# Patient Record
Sex: FEMALE | Race: WHITE | NOT HISPANIC OR LATINO | Employment: OTHER | URBAN - METROPOLITAN AREA
[De-identification: names, ages, dates, MRNs, and addresses within clinical notes are randomized per-mention and may not be internally consistent; named-entity substitution may affect disease eponyms.]

---

## 2017-02-02 DIAGNOSIS — F41.9 ANXIETY: Primary | ICD-10-CM

## 2017-02-02 NOTE — TELEPHONE ENCOUNTER
Patient takes this for anxiety.  Prescription approved per [facility] Refill Protocol.  Jil Morales, RN  Triage Nurse

## 2017-02-02 NOTE — TELEPHONE ENCOUNTER
sertraline (ZOLOFT) 50 MG     Last Written Prescription Date: 8/8/16  Last Fill Quantity: 90, # refills: 1  Last Office Visit with [facility] primary care provider:  8/8/16   Next 5 appointments (look out 90 days)     Feb 13, 2017  9:15 AM   PHYSICAL with Jaenne Sanon MD   [facility] ([facility]    [address] [place] [id number] [phone]                   Last PHQ-9 score on record=   PHQ-9 SCORE 8/13/2014   Total Score -   Total Score MyChart 0

## 2017-02-13 ENCOUNTER — OFFICE VISIT (OUTPATIENT)
Dept: FAMILY MEDICINE | Facility: CLINIC | Age: 82
End: 2017-02-13
Payer: COMMERCIAL

## 2017-02-13 VITALS
HEART RATE: 79 BPM | RESPIRATION RATE: 16 BRPM | SYSTOLIC BLOOD PRESSURE: 128 MMHG | BODY MASS INDEX: 22.11 KG/M2 | DIASTOLIC BLOOD PRESSURE: 64 MMHG | OXYGEN SATURATION: 96 % | TEMPERATURE: 97.8 F | WEIGHT: 132.7 LBS | HEIGHT: 65 IN

## 2017-02-13 DIAGNOSIS — F41.9 ANXIETY: ICD-10-CM

## 2017-02-13 DIAGNOSIS — E78.5 HYPERLIPIDEMIA LDL GOAL <130: ICD-10-CM

## 2017-02-13 DIAGNOSIS — M41.34 THORACOGENIC SCOLIOSIS OF THORACIC REGION: ICD-10-CM

## 2017-02-13 DIAGNOSIS — Z00.00 ROUTINE GENERAL MEDICAL EXAMINATION AT A HEALTH CARE FACILITY: Primary | ICD-10-CM

## 2017-02-13 DIAGNOSIS — R09.89 RIGHT CAROTID BRUIT: ICD-10-CM

## 2017-02-13 PROCEDURE — 99397 PER PM REEVAL EST PAT 65+ YR: CPT | Performed by: INTERNAL MEDICINE

## 2017-02-13 PROCEDURE — 36415 COLL VENOUS BLD VENIPUNCTURE: CPT | Performed by: INTERNAL MEDICINE

## 2017-02-13 PROCEDURE — 80061 LIPID PANEL: CPT | Performed by: INTERNAL MEDICINE

## 2017-02-13 PROCEDURE — 82043 UR ALBUMIN QUANTITATIVE: CPT | Performed by: INTERNAL MEDICINE

## 2017-02-13 PROCEDURE — 80053 COMPREHEN METABOLIC PANEL: CPT | Performed by: INTERNAL MEDICINE

## 2017-02-13 NOTE — MR AVS SNAPSHOT
After Visit Summary   2/13/2017    Jennifer Whiteside    MRN: [id number]           Patient Information     Date Of Birth          1935        Visit Information        Provider Department      2/13/2017 9:15 AM Jeanne Sanon MD [facility] [place]        Today's Diagnoses     Routine general medical examination at a health care facility    -  1    Hyperlipidemia LDL goal <130        Anxiety        Right carotid bruit        Thoracogenic scoliosis of thoracic region          Care Instructions      Preventive Health Recommendations    Female Ages 65 +    Yearly exam:     See your health care provider every year in order to  o Review health changes.   o Discuss preventive care.    o Review your medicines if your doctor has prescribed any.      You no longer need a yearly Pap test unless you've had an abnormal Pap test in the past 10 years. If you have vaginal symptoms, such as bleeding or discharge, be sure to talk with your provider about a Pap test.      Every 1 to 2 years, have a mammogram.  If you are over 69, talk with your health care provider about whether or not you want to continue having screening mammograms.      Every 10 years, have a colonoscopy. Or, have a yearly FIT test (stool test). These exams will check for colon cancer.       Have a cholesterol test every 5 years, or more often if your doctor advises it.       Have a diabetes test (fasting glucose) every three years. If you are at risk for diabetes, you should have this test more often.       At age 65, have a bone density scan (DEXA) to check for osteoporosis (brittle bone disease).    Shots:    Get a flu shot each year.    Get a tetanus shot every 10 years.    Talk to your doctor about your pneumonia vaccines. There are now two you should receive - Pneumovax (PPSV 23) and Prevnar (PCV 13).    Talk to your doctor about the shingles vaccine.    Talk to your doctor about the hepatitis B vaccine.    Nutrition:      Eat at least 5 servings of fruits and vegetables each day.      Eat whole-grain bread, whole-wheat pasta and brown rice instead of white grains and rice.      Talk to your provider about Calcium and Vitamin D.     Lifestyle    Exercise at least 150 minutes a week (30 minutes a day, 5 days a week). This will help you control your weight and prevent disease.      Limit alcohol to one drink per day.      No smoking.       Wear sunscreen to prevent skin cancer.       See your dentist twice a year for an exam and cleaning.      See your eye doctor every 1 to 2 years to screen for conditions such as glaucoma, macular degeneration and cataracts.        Follow-ups after your visit        Who to contact     If you have questions or need follow up information about today's clinic visit or your schedule please contact [facility] directly at [phone].  Normal or non-critical lab and imaging results will be communicated to you by Springpadhart, letter or phone within 4 business days after the clinic has received the results. If you do not hear from us within 7 days, please contact the clinic through Springpadhart or phone. If you have a critical or abnormal lab result, we will notify you by phone as soon as possible.  Submit refill requests through Vistaar or call your pharmacy and they will forward the refill request to us. Please allow 3 business days for your refill to be completed.          Additional Information About Your Visit        Vistaar Information     Vistaar gives you secure access to your electronic health record. If you see a primary care provider, you can also send messages to your care team and make appointments. If you have questions, please call your primary care clinic.  If you do not have a primary care provider, please call [phone] and they will assist you.        Care EveryWhere ID     This is your Care EveryWhere ID. This could be used by other organizations to access your [place]  "medical records  [id number]        Your Vitals Were     Pulse Temperature Respirations Height Pulse Oximetry BMI (Body Mass Index)    79 97.8  F (36.6  C) (Oral) 16 5' 4.5\" (1.638 m) 96% 22.43 kg/m2       Blood Pressure from Last 3 Encounters:   02/13/17 128/64   12/22/16 148/80   08/29/16 126/67    Weight from Last 3 Encounters:   02/13/17 132 lb 11.2 oz (60.2 kg)   08/29/16 131 lb (59.4 kg)   08/08/16 131 lb (59.4 kg)              We Performed the Following     Albumin Random Urine Quantitative     Comprehensive metabolic panel     Lipid Profile with reflex to direct LDL          Where to get your medicines      These medications were sent to [id number] - [place], [place] - [address]  [address] [facility] [address]     Phone:  [phone]     sertraline 50 MG tablet          Primary Care Provider Office Phone # Fax #    Jeanne Sanon [phone]       [facility] [address] [address]  [facility] [address]        Thank you!     Thank you for choosing [facility]  for your care. Our goal is always to provide you with excellent care. Hearing back from our patients is one way we can continue to improve our services. Please take a few minutes to complete the written survey that you may receive in the mail after your visit with us. Thank you!             Your Updated Medication List - Protect others around you: Learn how to safely use, store and throw away your medicines at www.disposemymeds.org.          This list is accurate as of: 2/13/17  9:45 AM.  Always use your most recent med list.                   Brand Name Dispense Instructions for use    aspirin 81 MG EC tablet      Take 1 tablet by mouth daily.       GARLIC PO      Take 2 tablets by mouth daily       Multi-vitamin Tabs tablet   Generic drug:  multivitamin, therapeutic with minerals     100 tablet    Take  by mouth. Equaline MVI - 1 daily       omega-3 fatty acids 1200 MG capsule      " Take 1 capsule by mouth 2 times daily.       sertraline 50 MG tablet    ZOLOFT    90 tablet    Take 1 tablet (50 mg) by mouth daily Take 1 tablet by mouth daily Profile Rx: patient will contact pharmacy when needed

## 2017-02-13 NOTE — PROGRESS NOTES
SUBJECTIVE:                                                            Jennifer Whiteside is a 82 year old female who presents for a preventive visit.    Are you in the first 12 months of your Medicare Part B coverage?  No      HISTORY OF PRESENT ILLNESS:    Healthy Habits:  Answers for HPI/ROS submitted by the patient on 2/10/2017   Annual Exam:  Getting at least 3 servings of Calcium per day:: NO  Bi-annual eye exam:: Yes  Dental care twice a year:: Yes  Sleep apnea or symptoms of sleep apnea:: None  Diet:: Low salt, Low fat/cholesterol  Frequency of exercise:: 4-5 days/week  Duration of exercise:: 15-30 minutes  Taking medications regularly:: Yes  Medication side effects:: None  Additional concerns today:: No  PHQ-2 Depressed: Not at all, Not at all  PHQ-2 Score: 0  COGNITIVE SCREEN  1) Repeat 3 items (Banana, Sunrise, Chair)    2) Clock draw: NORMAL  3) 3 item recall: Recalls 3 objects  Results: 3 items recalled: COGNITIVE IMPAIRMENT LESS LIKELY    Mini-CogTM Copyright GRAZYNA Parra. Licensed by the author for use in Health system; reprinted with permission (justino@[facility]). All rights reserved.      Living Arrangements:  Five months ago, the patient moved from her town home - which was purchased by her friends - and has been living at [facility] ever since. She states her move to [facility] was the best decision she could have made, because she is incredibly happy there. The staff there is wonderful to her.    Hearing:  The patient reports she purchased hearing aids in July and has noticed an incredible difference in her hearing.    Anxiety Follow-Up    Status since last visit: Improved     Other associated symptoms:None    Complicating factors:   Significant life event: No   Current substance abuse: None  Depression symptoms: No  LEE-7 SCORE 4/19/2012 8/13/2014   Total Score 1 0   Total Score - 0      GAD7         All Histories reviewed and updated in [facility] as appropriate.  Social History  "  Substance Use Topics     Smoking status: Former Smoker     Packs/day: 0.50     Years: 3.00     Types: Cigarettes     Smokeless tobacco: Never Used      Comment: quit 1958     Alcohol use Yes      Comment: occ wine     The patient does not drink >3 drinks per day nor >7 drinks per week.    Today's PHQ-2 Score:   PHQ-2 ( 1999 Pfizer) 2/10/2017 2/9/2016   Q1: Little interest or pleasure in doing things - 0   Q2: Feeling down, depressed or hopeless - 0   PHQ-2 Score - 0   Little interest or pleasure in doing things Not at all -   Feeling down, depressed or hopeless Not at all -   PHQ-2 Score 0 -     Do you feel safe in your environment - Yes  Do you have a Health Care Directive?: Yes: Advance Directive has been received and scanned.    Current providers sharing in care for this patient include:   Patient Care Team:  Jeanne Sanon MD as PCP - General (Internal Medicine)      Hearing impairment: Yes, wears hearing aids    Ability to successfully perform activities of daily living: Yes, no assistance needed     Fall risk:  Fallen 2 or more times in the past year?: No  Any fall with injury in the past year?: No    Home safety:  none identified    The following health maintenance items are reviewed in Epic and correct as of today:  Health Maintenance   Topic Date Due     LDL LESS THAN 100 RED WING USE ONLY  1935     FALL RISK ASSESSMENT  02/10/2017     ADVANCE DIRECTIVE PLANNING Q5 YRS (NO INBASKET)  11/17/2020     TETANUS IMMUNIZATION (SYSTEM ASSIGNED)  05/05/2021     INFLUENZA VACCINE (SYSTEM ASSIGNED)  Completed     DEXA SCAN SCREENING (SYSTEM ASSIGNED)  Completed     PNEUMOCOCCAL  Completed     Mammogram Screening: Patient over age 75, has elected to continue with mammography screening.     REVIEW OF SYSTEMS:  C: Currently living at \"[facility] in [place]; NEGATIVE for fever, chills, or change in weight  E: NEGATIVE for vision changes or irritation  E/M: POSITIVE for hearing loss - use of hearing " "aids; NEGATIVE for mouth and throat problems  R: NEGATIVE for significant cough or SOB  CV: Hx of Right Carotid Bruit and Carotid Artery Stenosis; NEGATIVE for chest pain, palpitations or peripheral edema  GI: Colonoscopy from 8/29/2016 was reviewed with the patient; NEGATIVE for nausea, abdominal pain, heartburn, or change in bowel habits  B: NEGATIVE for masses, tenderness, or nipple discharge  : NEGATIVE for unusual urinary or vaginal symptoms  M: Hx of Generalized Osteoarthrosis; NEGATIVE for significant arthralgias or myalgia  N: NEGATIVE for weakness, dizziness or paresthesias  E: Hyperlipidemia - use of Fish Oil (Omega-3 Fatty Acids); Labs reviewed; previously she was on Welchol and had an excellent response to the medications but found it cost prohibitive.  If need be, she would reconsider. NEGATIVE for temperature intolerance, or skin/hair changes  P: Anxiety - use of Sertraline; NEGATIVE for changes in mood or affect    This document serves as a record of the services and decisions personally performed and made by Jeanne Sanon MD. It was created on her behalf by Earlene Blackman, a trained medical scribe. The creation of this document is based the provider's statements to the medical scribe.  Earlene Blackman, February 13, 2017 9:30 AM     Problem list, Medication list, Allergies, and Medical/Social/Surgical histories reviewed in [facility] and updated as appropriate.  Labs reviewed in EPIC  OBJECTIVE:                                                            /64 (BP Location: Right arm, Patient Position: Chair, Cuff Size: Adult Regular)  Pulse 79  Temp 97.8  F (36.6  C) (Oral)  Resp 16  Ht 5' 4.5\" (1.638 m)  Wt 132 lb 11.2 oz (60.2 kg)  SpO2 96%  BMI 22.43 kg/m2 Estimated body mass index is 22.43 kg/(m^2) as calculated from the following:    Height as of this encounter: 5' 4.5\" (1.638 m).    Weight as of this encounter: 132 lb 11.2 oz (60.2 kg).    EXAM:  GENERAL: Patient appears healthy, alert and not " distressed.  EYES: Eyes appear grossly normal to inspection, with normal conjunctivae and sclerae  HENT: Ear canals and TM's appear normal, nose and mouth are without ulcers or lesions, oropharynx is clear and oral mucous membranes are moist  NECK: No adenopathy present, no asymmetry, masses, or scars noted, thyroid is normal to palpation  RESP: Lungs are clear to auscultation - no rales, rhonchi or wheezes present  CV: Soft murmur heard best along the left upper sternal border, soft right carotid bruit noted; Regular rate and rhythm, no ectopy, no peripheral edema present, peripheral pulses normal  ABDOMEN: Soft, nontender, no hepatosplenomegaly, no masses, normal bowel sounds  BREAST: Normal without masses, tenderness or nipple discharge and no palpable axillary masses or adenopathy   MS: No gross musculoskeletal defects noted, no edema, gait is age appropriate without ataxia  BACK: Right upper back is more prominent than the left  SKIN: No suspicious rashes, lesions, or moles.  NEURO: Patient exhibits normal strength and tone, normal speech and mentation  PSYCH: Mentation appears normal, affect is normal/bright    Diagnostic Test Results:  No results found for this or any previous visit (from the past 24 hour(s)).     ASSESSMENT / PLAN:                                                            (Z00.00) Routine general medical examination at a health care facility  (primary encounter diagnosis)  Comment: Annual preventative visit. Medications reviewed with patient. TDAP 2011  Continue to eat healthy and get regular exercise - goal of 150 minutes per week  The patient moved to [facility] in [place] in October 2016.  Plan: Lipid Profile with reflex to direct LDL, Albumin Random Urine Quantitative    (E78.5) Hyperlipidemia LDL goal <130  Comment: LDL Cholesterol has historically been at goal without the need for Statin intervention; Continues to take Fish Oil Omega-3 Fatty Acids once daily; Labs today to recheck  "lipid levels.  Recent Labs   Lab Test  11/10/16   0858  08/04/16   0844   02/09/15   1007  02/04/14   1133   CHOL  203*  201*   < >  179  183   HDL  70  69   < >  70  55   LDL  117*  113*   < >  93  102   TRIG  78  96   < >  80  127   CHOLHDLRATIO   --    --    --   2.6  3.3    < > = values in this interval not displayed.   Plan: Comprehensive metabolic panel, Lipid Profile         with reflex to direct LDL, Albumin Random Urine Quantitative          (F41.9) Anxiety  Comment: Worry; Mood is otherwise stable today; Sertraline has been effective and is well-tolerated; No changes in medication or dosing; Will continue to monitor her mood.  Plan: sertraline (ZOLOFT) 50 MG tablet          (R09.89) Right carotid bruit  Comment: Soft and unchanged since last noted in August 2016; Will continue to monitor.  Plan: Comprehensive metabolic panel, Lipid Profile         with reflex to direct LDL          (M41.34) Thoracogenic scoliosis of thoracic region  Comment: Right sided curvature; Patient continues to keep active; Follows with Chiropractic and Massage services once monthly with good relief of her symptoms.  Plan: If symptoms worsen, may need to consider physical therapy.        End of Life Planning:  Patient currently has an advanced directive: Yes.  Practitioner is supportive of decision.    COUNSELING:  Reviewed preventive health counseling, as reflected in patient instructions       Regular exercise       Healthy diet/nutrition    Estimated body mass index is 22.43 kg/(m^2) as calculated from the following:    Height as of this encounter: 5' 4.5\" (1.638 m).    Weight as of this encounter: 132 lb 11.2 oz (60.2 kg).  Weight management plan noted, stable and monitoring   reports that she has quit smoking. Her smoking use included Cigarettes. She has a 1.50 pack-year smoking history. She has never used smokeless tobacco.      Appropriate preventive services were discussed with this patient, including applicable screening as " appropriate for cardiovascular disease, diabetes, osteopenia/osteoporosis, and glaucoma.  As appropriate for age/gender, discussed screening for colorectal cancer, prostate cancer, breast cancer, and cervical cancer. Checklist reviewing preventive services available has been given to the patient.    Reviewed patients plan of care and provided an AVS. The Basic Care Plan (routine screening as documented in Health Maintenance) for Jennifer meets the Care Plan requirement. This Care Plan has been established and reviewed with the Patient.    Counseling Resources:  ATP IV Guidelines  Pooled Cohorts Equation Calculator  Breast Cancer Risk Calculator  FRAX Risk Assessment  ICSI Preventive Guidelines  Dietary Guidelines for Americans, 2010  USDA's MyPlate  ASA Prophylaxis  Lung CA Screening    The information in this document, created by a medical scribe for me, accurately reflects the services I personally performed and the decisions made by me. I have reviewed and approved this document for accuracy.  Dr. Jeanne Sanon, 9:48 AM, February 13, 2017    Jeanne Sanon MD  [facility] [place]  Answers for HPI/ROS submitted by the patient on 2/10/2017

## 2017-02-13 NOTE — NURSING NOTE
"Chief Complaint   Patient presents with     Physical     Anxiety       Initial /64 (BP Location: Right arm, Patient Position: Chair, Cuff Size: Adult Regular)  Pulse 79  Temp 97.8  F (36.6  C) (Oral)  Resp 16  Ht 5' 4.5\" (1.638 m)  Wt 132 lb 11.2 oz (60.2 kg)  SpO2 96%  BMI 22.43 kg/m2 Estimated body mass index is 22.43 kg/(m^2) as calculated from the following:    Height as of this encounter: 5' 4.5\" (1.638 m).    Weight as of this encounter: 132 lb 11.2 oz (60.2 kg).  Medication Reconciliation: complete    "

## 2017-02-14 LAB
ALBUMIN SERPL-MCNC: 4.2 G/DL (ref 3.4–5)
ALP SERPL-CCNC: 88 U/L (ref 40–150)
ALT SERPL W P-5'-P-CCNC: 20 U/L (ref 0–50)
ANION GAP SERPL CALCULATED.3IONS-SCNC: 5 MMOL/L (ref 3–14)
AST SERPL W P-5'-P-CCNC: 15 U/L (ref 0–45)
BILIRUB SERPL-MCNC: 0.6 MG/DL (ref 0.2–1.3)
BUN SERPL-MCNC: 12 MG/DL (ref 7–30)
CALCIUM SERPL-MCNC: 9.2 MG/DL (ref 8.5–10.1)
CHLORIDE SERPL-SCNC: 104 MMOL/L (ref 94–109)
CHOLEST SERPL-MCNC: 219 MG/DL
CO2 SERPL-SCNC: 29 MMOL/L (ref 20–32)
CREAT SERPL-MCNC: 0.63 MG/DL (ref 0.52–1.04)
CREAT UR-MCNC: 42 MG/DL
GFR SERPL CREATININE-BSD FRML MDRD: NORMAL ML/MIN/1.7M2
GLUCOSE SERPL-MCNC: 94 MG/DL (ref 70–99)
HDLC SERPL-MCNC: 61 MG/DL
LDLC SERPL CALC-MCNC: 141 MG/DL
MICROALBUMIN UR-MCNC: <5 MG/L
MICROALBUMIN/CREAT UR: NORMAL MG/G CR (ref 0–25)
NONHDLC SERPL-MCNC: 158 MG/DL
POTASSIUM SERPL-SCNC: 5.3 MMOL/L (ref 3.4–5.3)
PROT SERPL-MCNC: 7.3 G/DL (ref 6.8–8.8)
SODIUM SERPL-SCNC: 138 MMOL/L (ref 133–144)
TRIGL SERPL-MCNC: 84 MG/DL

## 2017-02-28 ENCOUNTER — MYC MEDICAL ADVICE (OUTPATIENT)
Dept: FAMILY MEDICINE | Facility: CLINIC | Age: 82
End: 2017-02-28

## 2017-02-28 NOTE — TELEPHONE ENCOUNTER
Patient concerned with increasing cholesterol since last year.     2/10/2016  Value 171  2/13/2017  Value  219    Asking if should take Welchol.    Please advise.    Salina De Dios RN

## 2017-03-09 NOTE — TELEPHONE ENCOUNTER
Patient called back, read Dr. Sanon's note, she is very happy, she will continue with diet and exercise.  Jil Morales, KRISHNA  Triage Nurse

## 2017-03-09 NOTE — TELEPHONE ENCOUNTER
Recent Labs   Lab Test  02/13/17   0948 12/22/16   02/09/15   1007  02/04/14   1133   CHOL  219*  218*   < >  179  183   HDL  61  62   < >  70  55   LDL  141*  140*   < >  93  102   TRIG  84  81   < >  80  127   CHOLHDLRATIO   --    --    --   2.6  3.3    < > = values in this interval not displayed.    lipid results have been stable.  Diet and exercise are encouraged to help to get LDL<130; so close. Keep up the good work.  Welchol did lower lipids but Ok to maximize with diet and exercise.  Jeanne Sanon MD  Internal Medicine  electronically signed

## 2017-10-04 ENCOUNTER — OFFICE VISIT (OUTPATIENT)
Dept: SURGERY | Facility: CLINIC | Age: 82
End: 2017-10-04
Attending: SURGERY
Payer: COMMERCIAL

## 2017-10-04 ENCOUNTER — HOSPITAL ENCOUNTER (OUTPATIENT)
Dept: ULTRASOUND IMAGING | Facility: CLINIC | Age: 82
Discharge: HOME | End: 2017-10-04
Attending: SURGERY | Admitting: SURGERY
Payer: MEDICARE

## 2017-10-04 VITALS
HEART RATE: 76 BPM | HEIGHT: 65 IN | OXYGEN SATURATION: 97 % | SYSTOLIC BLOOD PRESSURE: 162 MMHG | DIASTOLIC BLOOD PRESSURE: 88 MMHG | BODY MASS INDEX: 21.99 KG/M2 | WEIGHT: 132 LBS

## 2017-10-04 DIAGNOSIS — I65.21 CAROTID STENOSIS, ASYMPTOMATIC, RIGHT: Primary | ICD-10-CM

## 2017-10-04 DIAGNOSIS — I65.29 CAROTID STENOSIS: ICD-10-CM

## 2017-10-04 PROCEDURE — 93880 EXTRACRANIAL BILAT STUDY: CPT

## 2017-10-04 PROCEDURE — 99213 OFFICE O/P EST LOW 20 MIN: CPT | Performed by: SURGERY

## 2017-10-04 NOTE — LETTER
2017    Re: Jennifer Law [place] - 1935      83 y/o female s/p left carotid endarterectomy  here for F/U.  No history TIA CVA Amaurosis fugax since last check.  Carotids- no bruits motor/sensory intact Cr. N. 2-12 intact.  U/S shows widely patent endarterectomy site, right side stable.  Continue present medications F/U two years.    Willis Butcher MD

## 2017-10-04 NOTE — PROGRESS NOTES
83 y/o female s/p left carotid endarterectomy 2012 here for F/U.  No history TIA CVA Amaurosis fugax since last check.  Carotids-4/4 no bruits motor/sensory intact Cr. N. 2-12 intact.  U/S shows widely patent endarterectomy site, right side stable.  Continue present medications F/U two years.

## 2017-10-04 NOTE — NURSING NOTE
"Chief Complaint   Patient presents with     RECHECK     bilat carotid       Initial /88 (BP Location: Left arm, Cuff Size: Adult Regular)  Pulse 76  Ht 5' 4.5\" (1.638 m)  Wt 132 lb (59.9 kg)  SpO2 97%  Breastfeeding? No  BMI 22.31 kg/m2 Estimated body mass index is 22.31 kg/(m^2) as calculated from the following:    Height as of this encounter: 5' 4.5\" (1.638 m).    Weight as of this encounter: 132 lb (59.9 kg).  Medication Reconciliation: complete           "

## 2017-10-04 NOTE — MR AVS SNAPSHOT
"              After Visit Summary   10/4/2017    Jennifer Whiteside    MRN: [id number]           Patient Information     Date Of Birth          1935        Visit Information        Provider Department      10/4/2017 11:45 AM Willis Butcher MD [facility]      Today's Diagnoses     Carotid stenosis, asymptomatic, right    -  1       Follow-ups after your visit        Who to contact     If you have questions or need follow up information about today's clinic visit or your schedule please contact [facility] directly at [phone].  Normal or non-critical lab and imaging results will be communicated to you by 21viaNethart, letter or phone within 4 business days after the clinic has received the results. If you do not hear from us within 7 days, please contact the clinic through 21viaNethart or phone. If you have a critical or abnormal lab result, we will notify you by phone as soon as possible.  Submit refill requests through Lipperhey or call your pharmacy and they will forward the refill request to us. Please allow 3 business days for your refill to be completed.          Additional Information About Your Visit        MyChart Information     Lipperhey gives you secure access to your electronic health record. If you see a primary care provider, you can also send messages to your care team and make appointments. If you have questions, please call your primary care clinic.  If you do not have a primary care provider, please call [phone] and they will assist you.        Care EveryWhere ID     This is your Care EveryWhere ID. This could be used by other organizations to access your [place] medical records  [id number]        Your Vitals Were     Pulse Height Pulse Oximetry Breastfeeding? BMI (Body Mass Index)       76 5' 4.5\" (1.638 m) 97% No 22.31 kg/m2        Blood Pressure from Last 3 Encounters:   10/04/17 162/88   02/13/17 " 128/64   12/22/16 148/80    Weight from Last 3 Encounters:   10/04/17 132 lb (59.9 kg)   02/13/17 132 lb 11.2 oz (60.2 kg)   08/29/16 131 lb (59.4 kg)              Today, you had the following     No orders found for display       Primary Care Provider Office Phone # Fax #    Jeanne Jeremiah Sanon [phone] [address] [address] [facility] [address]        Equal Access to Services     PRINCESS ESPAÑA : Hadii aad ku hadasho Soomaali, waaxda luqadaha, qaybta kaalmada adeegyada, waxay idiin hayaan adeeg kharabenson redding . So [facility] [phone].    ATENCIÓN: Si habla español, tiene a starr disposición servicios gratuitos de asistencia lingüística. Llame al [phone].    We comply with applicable federal civil rights laws and [place] laws. We do not discriminate on the basis of race, color, national origin, age, disability, sex, sexual orientation, or gender identity.            Thank you!     Thank you for choosing [facility]  for your care. Our goal is always to provide you with excellent care. Hearing back from our patients is one way we can continue to improve our services. Please take a few minutes to complete the written survey that you may receive in the mail after your visit with us. Thank you!             Your Updated Medication List - Protect others around you: Learn how to safely use, store and throw away your medicines at www.disposemymeds.org.          This list is accurate as of: 10/4/17  1:32 PM.  Always use your most recent med list.                   Brand Name Dispense Instructions for use Diagnosis    aspirin 81 MG EC tablet      Take 1 tablet by mouth daily.    Atrial fibrillation with RVR (H), PVD (peripheral vascular disease) (H)       GARLIC PO      Take 2 tablets by mouth daily        Multi-vitamin Tabs tablet   Generic drug:  multivitamin, therapeutic with minerals     100 tablet    Take  by mouth. Equaline MVI - 1 daily        omega-3 fatty acids 1200 MG  capsule      Take 1 capsule by mouth 2 times daily.        sertraline 50 MG tablet    ZOLOFT    90 tablet    Take 1 tablet (50 mg) by mouth daily Take 1 tablet by mouth daily Profile Rx: patient will contact pharmacy when needed    Anxiety

## 2017-11-09 ENCOUNTER — OFFICE VISIT (OUTPATIENT)
Dept: INTERNAL MEDICINE | Facility: CLINIC | Age: 82
End: 2017-11-09
Payer: COMMERCIAL

## 2017-11-09 VITALS
WEIGHT: 133.3 LBS | HEART RATE: 92 BPM | OXYGEN SATURATION: 98 % | DIASTOLIC BLOOD PRESSURE: 68 MMHG | TEMPERATURE: 98.2 F | BODY MASS INDEX: 22.53 KG/M2 | SYSTOLIC BLOOD PRESSURE: 114 MMHG

## 2017-11-09 DIAGNOSIS — R05.9 COUGH: Primary | ICD-10-CM

## 2017-11-09 PROCEDURE — 99213 OFFICE O/P EST LOW 20 MIN: CPT | Performed by: NURSE PRACTITIONER

## 2017-11-09 NOTE — NURSING NOTE
"Chief Complaint   Patient presents with     RECHECK     chest discomfort for several months, dry coughs       Initial /68 (BP Location: Right arm, Patient Position: Sitting, Cuff Size: Adult Regular)  Pulse 92  Temp 98.2  F (36.8  C) (Oral)  Wt 133 lb 4.8 oz (60.5 kg)  SpO2 98%  Breastfeeding? No  BMI 22.53 kg/m2 Estimated body mass index is 22.53 kg/(m^2) as calculated from the following:    Height as of 10/4/17: 5' 4.5\" (1.638 m).    Weight as of this encounter: 133 lb 4.8 oz (60.5 kg).  Medication Reconciliation: complete     Amanda Jackson, REED      "

## 2017-11-09 NOTE — MR AVS SNAPSHOT
After Visit Summary   11/9/2017    Jennifer Whiteside    MRN: [id number]           Patient Information     Date Of Birth          1935        Visit Information        Provider Department      11/9/2017 9:20 AM Larisa Alvarenga NP [facility]        Today's Diagnoses     Cough    -  1       Follow-ups after your visit        Who to contact     If you have questions or need follow up information about today's clinic visit or your schedule please contact [facility] directly at [phone].  Normal or non-critical lab and imaging results will be communicated to you by PrizeBoxâ„¢hart, letter or phone within 4 business days after the clinic has received the results. If you do not hear from us within 7 days, please contact the clinic through OmniForcet or phone. If you have a critical or abnormal lab result, we will notify you by phone as soon as possible.  Submit refill requests through Rapport or call your pharmacy and they will forward the refill request to us. Please allow 3 business days for your refill to be completed.          Additional Information About Your Visit        MyChart Information     Rapport gives you secure access to your electronic health record. If you see a primary care provider, you can also send messages to your care team and make appointments. If you have questions, please call your primary care clinic.  If you do not have a primary care provider, please call [phone] and they will assist you.        Care EveryWhere ID     This is your Care EveryWhere ID. This could be used by other organizations to access your [place] medical records  [id number]        Your Vitals Were     Pulse Temperature Pulse Oximetry Breastfeeding? BMI (Body Mass Index)       92 98.2  F (36.8  C) (Oral) 98% No 22.53 kg/m2        Blood Pressure from Last 3 Encounters:   11/09/17 114/68   10/04/17 162/88   02/13/17 128/64    Weight from Last 3 Encounters:   11/09/17  133 lb 4.8 oz (60.5 kg)   10/04/17 132 lb (59.9 kg)   02/13/17 132 lb 11.2 oz (60.2 kg)              Today, you had the following     No orders found for display       Primary Care Provider Office Phone # Fax #    Jeanne Sanon [phone] [address] [address] [facility] [address]        Equal Access to Services     PRINCESS ESPAÑA AH: Hadii aad ku hadasho Soomaali, waaxda luqadaha, qaybta kaalmada adeegyada, waxay idiin hayaan adeeg kharash la'aan ah. So [facility] [phone].    ATENCIÓN: Si habla espagatha, tiene a starr disposición servicios gratuitos de asistencia lingüística. Llame al [phone].    We comply with applicable federal civil rights laws and [place] laws. We do not discriminate on the basis of race, color, national origin, age, disability, sex, sexual orientation, or gender identity.            Thank you!     Thank you for choosing [facility]  for your care. Our goal is always to provide you with excellent care. Hearing back from our patients is one way we can continue to improve our services. Please take a few minutes to complete the written survey that you may receive in the mail after your visit with us. Thank you!             Your Updated Medication List - Protect others around you: Learn how to safely use, store and throw away your medicines at www.disposemymeds.org.          This list is accurate as of: 11/9/17 10:08 AM.  Always use your most recent med list.                   Brand Name Dispense Instructions for use Diagnosis    aspirin 81 MG EC tablet      Take 1 tablet by mouth daily.    Atrial fibrillation with RVR (H), PVD (peripheral vascular disease) (H)       GARLIC PO      Take 2 tablets by mouth daily        Multi-vitamin Tabs tablet   Generic drug:  multivitamin, therapeutic with minerals     100 tablet    Take  by mouth. Equaline MVI - 1 daily        omega-3 fatty acids 1200 MG capsule      Take 1 capsule by mouth 2 times daily.        sertraline  50 MG tablet    ZOLOFT    90 tablet    Take 1 tablet (50 mg) by mouth daily Take 1 tablet by mouth daily Profile Rx: patient will contact pharmacy when needed    Anxiety       UNABLE TO FIND      MEDICATION NAME: Artichoke leaves capsule from [employer], started one yr to help with lowering cholesterol recommended by her nephew

## 2017-11-09 NOTE — PROGRESS NOTES
"  SUBJECTIVE:   Jennifer Whiteside is a 82 year old female who presents to clinic today for the following health issues:    chest discomfort for several months, dry coughs    RESPIRATORY SYMPTOMS      Duration: months    Description  AM coughing and \"clearing of chest\", occasional breathing heaviness in PM    Severity: mild    Accompanying signs and symptoms: None    History (predisposing factors):  none    Precipitating or alleviating factors: None    Therapies tried and outcome:  none          Patient Active Problem List   Diagnosis     Generalized osteoarthrosis, unspecified site     STOMACH FUNCTION DIS NEC(aka DYSPEPSIA)     Nonspecific abnormal results of liver function study     Adjustment disorder with anxiety     Hyperlipidemia LDL goal <130     Anxiety     Advance Care Planning     Carotid artery stenosis     New onset atrial fibrillation (H)     Right carotid bruit     Past Surgical History:   Procedure Laterality Date     APPENDECTOMY      appy as a child     C HILLIARD W/O FACETEC FORAMOT/DSKC 1/2 VRT SEG, LUMBAR      L4     CLOSED REDUCTION WRIST Right 1/20/2016    Procedure: CLOSED REDUCTION WRIST;  Surgeon: Mina Brand MD;  Location:  OR     COLONOSCOPY  8/6/2013    Procedure: COMBINED COLONOSCOPY, SINGLE BIOPSY/POLYPECTOMY BY BIOPSY;  COLONOSCOPY with polypectomy using cold forceps.;  Surgeon: Madeline Oviedo MD;  Location:  GI     COLONOSCOPY  8/29/2016    Dr. Oviedo [facility]     COLONOSCOPY N/A 8/29/2016    Procedure: COMBINED COLONOSCOPY, SINGLE OR MULTIPLE BIOPSY/POLYPECTOMY BY BIOPSY;  Surgeon: Madeline Oviedo MD;  Location:  GI     ENDARTERECTOMY CAROTID  5/10/2012    LEFT, WITH EEG ; Surgeon:SELINA HANKS; Location: OR     ENT SURGERY      T&A as a child     HC CORRECT BUNION,SIMPLE      Right     HC REPAIR ROTATOR CUFF,ACUTE  2000    Right     HC REPAIR ROTATOR CUFF,ACUTE  2002    Left     HYSTERECTOMY, VAGINAL      simple      rt foot hammer toe repair  2005     "   Social History   Substance Use Topics     Smoking status: Former Smoker     Packs/day: 0.50     Years: 3.00     Types: Cigarettes     Smokeless tobacco: Never Used      Comment: quit      Alcohol use Yes      Comment: occ wine     Family History   Problem Relation Age of Onset     DIABETES Brother      DIABETES Brother      C.A.D. Father       52 yo Cerebral hemorrhage     Hypertension Mother       84 yo     Arthritis Sister      Colon Cancer Sister      Family History Negative Son      Rheumatic fever     Family History Negative Son      Family History Negative Son      Family History Negative Daughter      Hypertension Daughter      Gynecology Daughter      D & C with issues uncertain         Current Outpatient Prescriptions   Medication Sig Dispense Refill     UNABLE TO FIND MEDICATION NAME: Artichoke leaves capsule from [employer], started one yr to help with lowering cholesterol recommended by her nephew       GARLIC PO Take 2 tablets by mouth daily       sertraline (ZOLOFT) 50 MG tablet Take 1 tablet (50 mg) by mouth daily Take 1 tablet by mouth daily Profile Rx: patient will contact pharmacy when needed 90 tablet 3     Multiple Vitamin (MULTI-VITAMIN) per tablet Take  by mouth. Equaline MVI - 1 daily 100 tablet 3     aspirin EC 81 MG EC tablet Take 1 tablet by mouth daily.       Omega-3 Fatty Acids (FISH OIL) 1200 MG capsule Take 1 capsule by mouth 2 times daily.       BP Readings from Last 3 Encounters:   17 114/68   10/04/17 162/88   17 128/64    Wt Readings from Last 3 Encounters:   17 133 lb 4.8 oz (60.5 kg)   10/04/17 132 lb (59.9 kg)   17 132 lb 11.2 oz (60.2 kg)                        Reviewed and updated as needed this visit by clinical staffTobacco  Allergies  Meds  Med Hx  Surg Hx  Fam Hx  Soc Hx      Reviewed and updated as needed this visit by Provider         ROS:  C: NEGATIVE for fever, chills, change in weight  E/M: NEGATIVE for ear, mouth  and throat problems  RESP:NEGATIVE for SOB/dyspnea and wheezing  CV: NEGATIVE for chest pain, palpitations or peripheral edema    OBJECTIVE:     /68 (BP Location: Right arm, Patient Position: Sitting, Cuff Size: Adult Regular)  Pulse 92  Temp 98.2  F (36.8  C) (Oral)  Wt 133 lb 4.8 oz (60.5 kg)  SpO2 98%  Breastfeeding? No  BMI 22.53 kg/m2  Body mass index is 22.53 kg/(m^2).  GENERAL: healthy, alert and no distress  HENT: ear canals and TM's normal, nose and mouth without ulcers or lesions  NECK: no adenopathy, no asymmetry, masses, or scars and thyroid normal to palpation  RESP: lungs clear to auscultation - no rales, rhonchi or wheezes  CV: regular rate and rhythm, normal S1 S2, no S3 or S4, no murmur, click or rub, no peripheral edema and peripheral pulses strong  ABDOMEN: soft, nontender, no hepatosplenomegaly, no masses and bowel sounds normal        ASSESSMENT/PLAN:               ICD-10-CM    1. Cough R05        Monitor sx, temp.  Sleep reclined, good humidification    Larisa Alvarenga NP  [facility]

## 2017-12-07 ENCOUNTER — ALLIED HEALTH/NURSE VISIT (OUTPATIENT)
Dept: FAMILY MEDICINE | Facility: CLINIC | Age: 82
End: 2017-12-07

## 2017-12-07 ENCOUNTER — RECORDS - HEALTHEAST (OUTPATIENT)
Dept: LAB | Facility: CLINIC | Age: 82
End: 2017-12-07

## 2017-12-07 VITALS
DIASTOLIC BLOOD PRESSURE: 55 MMHG | HEART RATE: 89 BPM | WEIGHT: 129.8 LBS | SYSTOLIC BLOOD PRESSURE: 108 MMHG | HEIGHT: 64 IN | BODY MASS INDEX: 22.16 KG/M2

## 2017-12-07 DIAGNOSIS — Z00.6 EXAMINATION OF PARTICIPANT IN CLINICAL TRIAL: Primary | ICD-10-CM

## 2017-12-07 LAB
CHOLEST SERPL-MCNC: 213 MG/DL
CREAT SERPL-MCNC: 0.65 MG/DL (ref 0.6–1.1)
FASTING STATUS PATIENT QL REPORTED: ABNORMAL
GFR SERPL CREATININE-BSD FRML MDRD: >60 ML/MIN/1.73M2
HDLC SERPL-MCNC: 57 MG/DL
LDLC SERPL CALC-MCNC: 145 MG/DL
TRIGL SERPL-MCNC: 56 MG/DL

## 2017-12-07 NOTE — MR AVS SNAPSHOT
"              After Visit Summary   12/7/2017    Jennifer Whiteside    MRN: [id number]           Patient Information     Date Of Birth          1935        Visit Information        Provider Department      12/7/2017 9:30 AM [facility]        Today's Diagnoses     Examination of participant in clinical trial    -  1       Follow-ups after your visit        Who to contact     Please call your clinic at [phone] to:    Ask questions about your health    Make or cancel appointments    Discuss your medicines    Learn about your test results    Speak to your doctor   If you have compliments or concerns about an experience at your clinic, or if you wish to file a complaint, please contact [facility] Patient [facility] at [phone] or email us at [email or web address]         Additional Information About Your Visit        MyChart Information     Neocraftst gives you secure access to your electronic health record. If you see a primary care provider, you can also send messages to your care team and make appointments. If you have questions, please call your primary care clinic.  If you do not have a primary care provider, please call [phone] and they will assist you.      [employer] is an electronic gateway that provides easy, online access to your medical records. With [employer], you can request a clinic appointment, read your test results, renew a prescription or communicate with your care team.     To access your existing account, please contact your [facility] or call [phone] for assistance.        Care EveryWhere ID     This is your Care EveryWhere ID. This could be used by other organizations to access your [place] medical records  [id number]        Your Vitals Were     Pulse Height BMI (Body Mass Index)             89 5' 4.17\" (163 cm) 22.16 kg/m2          Blood Pressure from Last 3 Encounters:   12/07/17 " 108/55   11/09/17 114/68   10/04/17 162/88    Weight from Last 3 Encounters:   12/07/17 129 lb 12.8 oz (58.9 kg)   11/09/17 133 lb 4.8 oz (60.5 kg)   10/04/17 132 lb (59.9 kg)              Today, you had the following     No orders found for display       Primary Care Provider Office Phone # Fax #    Jeanne Sanon [phone] [address]  [facility] [address]        Equal Access to Services     [facility]: Hadii aad ku hadasho Soomaali, waaxda luqadaha, qaybta kaalmada adeegyada, waxpatrick redding . So [facility] [phone].    ATENCIÓN: Si habla español, tiene a starr disposición servicios gratuitos de asistencia lingüística. Llame al [phone].    We comply with applicable federal civil rights laws and [place] laws. We do not discriminate on the basis of race, color, national origin, age, disability, sex, sexual orientation, or gender identity.            Thank you!     Thank you for choosing [facility]  for your care. Our goal is always to provide you with excellent care. Hearing back from our patients is one way we can continue to improve our services. Please take a few minutes to complete the written survey that you may receive in the mail after your visit with us. Thank you!             Your Updated Medication List - Protect others around you: Learn how to safely use, store and throw away your medicines at www.disposemymeds.org.          This list is accurate as of: 12/7/17  3:11 PM.  Always use your most recent med list.                   Brand Name Dispense Instructions for use Diagnosis    aspirin 81 MG EC tablet      Take 1 tablet by mouth daily.    Atrial fibrillation with RVR (H), PVD (peripheral vascular disease) (H)       GARLIC PO      Take 2 tablets by mouth daily        Multi-vitamin Tabs tablet   Generic drug:  multivitamin, therapeutic with minerals     100 tablet    Take  by mouth. Equaline MVI - 1 daily        omega-3 fatty  acids 1200 MG capsule      Take 1 capsule by mouth 2 times daily.        sertraline 50 MG tablet    ZOLOFT    90 tablet    Take 1 tablet (50 mg) by mouth daily Take 1 tablet by mouth daily Profile Rx: patient will contact pharmacy when needed    Anxiety       UNABLE TO FIND      MEDICATION NAME: Artichoke leaves capsule from [employer], started one yr to help with lowering cholesterol recommended by her nephew

## 2017-12-07 NOTE — PROGRESS NOTES
If you have questions, please contact:Dr. José Miguel Hill  PI Phone: [phone]  Coordinator Phone: [phone]                            Comments: Keiko completed cognitive and physical tests without difficulty. This is her 7th year in the study. She remains on aspirin therapy.  Lab samples sent to [facility].

## 2017-12-26 ENCOUNTER — MYC MEDICAL ADVICE (OUTPATIENT)
Dept: FAMILY MEDICINE | Facility: CLINIC | Age: 82
End: 2017-12-26

## 2017-12-26 NOTE — TELEPHONE ENCOUNTER
Please review INFOGRAPHIQS message regarding patient requesting a provider recommendation.    Elisa MALAVE RN, BSN, PHN  [place] [facility]

## 2018-02-12 ASSESSMENT — ACTIVITIES OF DAILY LIVING (ADL)
CURRENT_FUNCTION: NO ASSISTANCE NEEDED
I_NEED_ASSISTANCE_FOR_THE_FOLLOWING_DAILY_ACTIVITIES:: NO ASSISTANCE IS NEEDED

## 2018-02-15 ENCOUNTER — OFFICE VISIT (OUTPATIENT)
Dept: INTERNAL MEDICINE | Facility: CLINIC | Age: 83
End: 2018-02-15
Payer: COMMERCIAL

## 2018-02-15 VITALS
HEART RATE: 62 BPM | WEIGHT: 129.5 LBS | HEIGHT: 65 IN | OXYGEN SATURATION: 96 % | DIASTOLIC BLOOD PRESSURE: 58 MMHG | SYSTOLIC BLOOD PRESSURE: 90 MMHG | TEMPERATURE: 97.5 F | BODY MASS INDEX: 21.58 KG/M2

## 2018-02-15 DIAGNOSIS — Z78.0 ASYMPTOMATIC POSTMENOPAUSAL STATUS: ICD-10-CM

## 2018-02-15 DIAGNOSIS — R19.7 DIARRHEA, UNSPECIFIED TYPE: ICD-10-CM

## 2018-02-15 DIAGNOSIS — I77.9 BILATERAL CAROTID ARTERY DISEASE (H): Primary | ICD-10-CM

## 2018-02-15 DIAGNOSIS — F41.9 ANXIETY: ICD-10-CM

## 2018-02-15 DIAGNOSIS — Z13.1 SCREENING FOR DIABETES MELLITUS: ICD-10-CM

## 2018-02-15 DIAGNOSIS — E78.5 HYPERLIPIDEMIA LDL GOAL <70: ICD-10-CM

## 2018-02-15 DIAGNOSIS — I48.91 NEW ONSET ATRIAL FIBRILLATION (H): ICD-10-CM

## 2018-02-15 DIAGNOSIS — M85.80 OSTEOPENIA, UNSPECIFIED LOCATION: ICD-10-CM

## 2018-02-15 PROCEDURE — 80048 BASIC METABOLIC PNL TOTAL CA: CPT | Performed by: INTERNAL MEDICINE

## 2018-02-15 PROCEDURE — 36415 COLL VENOUS BLD VENIPUNCTURE: CPT | Performed by: INTERNAL MEDICINE

## 2018-02-15 PROCEDURE — 99214 OFFICE O/P EST MOD 30 MIN: CPT | Performed by: INTERNAL MEDICINE

## 2018-02-15 PROCEDURE — 80061 LIPID PANEL: CPT | Performed by: INTERNAL MEDICINE

## 2018-02-15 ASSESSMENT — ANXIETY QUESTIONNAIRES
IF YOU CHECKED OFF ANY PROBLEMS ON THIS QUESTIONNAIRE, HOW DIFFICULT HAVE THESE PROBLEMS MADE IT FOR YOU TO DO YOUR WORK, TAKE CARE OF THINGS AT HOME, OR GET ALONG WITH OTHER PEOPLE: NOT DIFFICULT AT ALL
5. BEING SO RESTLESS THAT IT IS HARD TO SIT STILL: NOT AT ALL
6. BECOMING EASILY ANNOYED OR IRRITABLE: NOT AT ALL
GAD7 TOTAL SCORE: 0
2. NOT BEING ABLE TO STOP OR CONTROL WORRYING: NOT AT ALL
1. FEELING NERVOUS, ANXIOUS, OR ON EDGE: NOT AT ALL
3. WORRYING TOO MUCH ABOUT DIFFERENT THINGS: NOT AT ALL
7. FEELING AFRAID AS IF SOMETHING AWFUL MIGHT HAPPEN: NOT AT ALL

## 2018-02-15 ASSESSMENT — PATIENT HEALTH QUESTIONNAIRE - PHQ9: 5. POOR APPETITE OR OVEREATING: NOT AT ALL

## 2018-02-15 NOTE — PROGRESS NOTES
SUBJECTIVE:   Jennifer Whiteside is a 83 year old female who presents to clinic today for the following health issues:    She is here to establish care with me.  She had been seeing Dr. Sanon at [place] but moved to [place] so would like to come to this clinic.    Problems:   1. Hyperlipidemia: takes supplements.  She had been on low-dose Lipitor in 2004 but had some mild increased liver function tests so that was discontinued.  She was on WelChol for many years but she stopped it on her own to take supplements instead.  2. Carotid stenosis: She has had carotid endarterectomy, last ultrasound showed mild stenosis.  3.  History of atrial fibrillation: This was following a surgery and she has never had any recurrences that she is aware of.  4.  Anxiety: She is stable on her medication.    Current Concerns:   Diarrhea: she awakened at 4 AM and had sudden onset of watery diarrhea.  This was associated with some urgency, no significant pain but mild cramping.  She also had some nausea.  She had several episodes and took 2 Imodium sparingly and has not had any further diarrhea since then.  No blood in the stool, no fever, chills, abdominal pain, no well water, antibiotics recently.    Patient Active Problem List   Diagnosis     Generalized osteoarthrosis, unspecified site     STOMACH FUNCTION DIS NEC(aka DYSPEPSIA)     Nonspecific abnormal results of liver function study     Adjustment disorder with anxiety     Hyperlipidemia LDL goal <130     Anxiety     Advance Care Planning     Carotid artery stenosis     New onset atrial fibrillation (H)     Right carotid bruit       Current Outpatient Prescriptions   Medication Sig Dispense Refill     UNABLE TO FIND MEDICATION NAME: Artichoke leaves capsule from [employer], started one yr to help with lowering cholesterol recommended by her nephew       GARLIC PO Take 2 tablets by mouth daily       sertraline (ZOLOFT) 50 MG tablet Take 1 tablet (50 mg) by mouth daily  "Take 1 tablet by mouth daily Profile Rx: patient will contact pharmacy when needed 90 tablet 3     Multiple Vitamin (MULTI-VITAMIN) per tablet Take  by mouth. Equaline MVI - 1 daily 100 tablet 3     aspirin EC 81 MG EC tablet Take 1 tablet by mouth daily.       Omega-3 Fatty Acids (FISH OIL) 1200 MG capsule Take 1 capsule by mouth 2 times daily.           Social History   Substance Use Topics     Smoking status: Former Smoker     Packs/day: 0.50     Years: 3.00     Types: Cigarettes     Smokeless tobacco: Never Used      Comment: quit 1958     Alcohol use Yes      Comment: occ wine          Reviewed and updated as needed this visit by clinical staff  Tobacco  Allergies  Meds  Problems  Med Hx  Surg Hx  Fam Hx  Soc Hx        Reviewed and updated as needed this visit by Provider         ROS:  No fever, chills, no dyspnea on exertion, no chest pain, palpitations, PND, orthopnea, edema, syncope, headache, abdominal pain     OBJECTIVE:     BP 90/58 (BP Location: Left arm, Patient Position: Sitting, Cuff Size: Adult Regular)  Pulse 62  Temp 97.5  F (36.4  C) (Oral)  Ht 5' 4.75\" (1.645 m)  Wt 129 lb 8 oz (58.7 kg)  SpO2 96%  BMI 21.72 kg/m2  Body mass index is 21.72 kg/(m^2).    Lungs: clear  CV: normal S1, S2 without murmur, S3 or S4.   Carotids: Soft bruit of the  right carotid, left is clear  Abdomen: Bowel sounds normal, soft, nontender. No hepatosplenomegaly. No masses.   Peripheral pulses intact    LEE-7 SCORE 4/19/2012 8/13/2014 2/15/2018   Total Score 1 0 -   Total Score - 0 -   Total Score - - 0         ASSESSMENT/PLAN:             1. Bilateral carotid artery disease (H)  Advised that I would tend to recommend trying a very low dose of pravastatin at least because of her known carotid artery disease.  Will check her labs and she may will consider this.    2. New onset atrial fibrillation (H)  No further episodes so will resolve this problem    3. Diarrhea, unspecified type  Likely acute viral, if " persistent consider tests.    4. Hyperlipidemia LDL goal <70  recheck  - Lipid panel reflex to direct LDL Fasting    5. Anxiety  Stable, continue med    6. Screening for diabetes mellitus  Check lab  - Basic metabolic panel        Carisa Waggoner MD  [facility]

## 2018-02-15 NOTE — MR AVS SNAPSHOT
After Visit Summary   2/15/2018    Jennifer Whiteside    MRN: [id number]           Patient Information     Date Of Birth          1935        Visit Information        Provider Department      2/15/2018 9:40 AM Carisa Waggoner MD [facility]        Today's Diagnoses     Hyperlipidemia LDL goal <130    -  1    Anxiety        Screening for diabetes mellitus          Care Instructions      PREVENTIVE HEALTH RECOMMENDATIONS:     Vaccines: Get a flu shot each year. Get a tetanus shot every 10 years.     Exercise for at least 150 minutes a week (an average of 30 minutes a day, 5 days of the week). This will help you control your weight and prevent disease.    Limit alcohol to one drink per day.    No smoking.     Wear sunscreen to prevent skin cancer.     See your dentist twice a year for an exam and cleaning.    Try to get Calcium 1200 mg total per day. It is best to not take it all at once. Try to get Vitamin D at least 200 units per day.    BMI or Body Mass Index is a way of indicating weight and health risk for cardiovascular diseases, high blood pressure, diabetes.   Definitions:    Underweight is less than 18.5 and will be associated with health risk.   Normal BMI is 18.5 to 25   Overweight is 25-29   Obesity is 30 or greater   Morbid Obesity is 40 or greater or 35 or greater with diabetes, high blood pressure or elevated cholesterol  Obesity and Morbid Obesity are associated with higher health risks. Lowering calories, exercising more may lower your BMI and even small decreases can have positive impact on lowering health risks.   Your Body mass index is 21.72 kg/(m^2)..             Follow-ups after your visit        Who to contact     If you have questions or need follow up information about today's clinic visit or your schedule please contact [facility] directly at [phone].  Normal or non-critical lab and imaging results will be communicated to you by  "MyChart, letter or phone within 4 business days after the clinic has received the results. If you do not hear from us within 7 days, please contact the clinic through Katalyst Networkhart or phone. If you have a critical or abnormal lab result, we will notify you by phone as soon as possible.  Submit refill requests through NurseBuddy or call your pharmacy and they will forward the refill request to us. Please allow 3 business days for your refill to be completed.          Additional Information About Your Visit        Katal[employer] NetworkharFan TV Information     NurseBuddy gives you secure access to your electronic health record. If you see a primary care provider, you can also send messages to your care team and make appointments. If you have questions, please call your primary care clinic.  If you do not have a primary care provider, please call [phone] and they will assist you.        Care EveryWhere ID     This is your Care EveryWhere ID. This could be used by other organizations to access your [place] medical records  [id number]        Your Vitals Were     Pulse Temperature Height Pulse Oximetry BMI (Body Mass Index)       62 97.5  F (36.4  C) (Oral) 5' 4.75\" (1.645 m) 96% 21.72 kg/m2        Blood Pressure from Last 3 Encounters:   02/15/18 90/58   12/07/17 108/55   11/09/17 114/68    Weight from Last 3 Encounters:   02/15/18 129 lb 8 oz (58.7 kg)   12/07/17 129 lb 12.8 oz (58.9 kg)   11/09/17 133 lb 4.8 oz (60.5 kg)              We Performed the Following     Basic metabolic panel     Lipid panel reflex to direct LDL Fasting        Primary Care Provider Office Phone # Fax #    Carisa Waggoner [phone]       [address] [facility] [address]  [facility] [address]        Equal Access to Services     BRENDA ESPAÑA : Reggie Yang, lonnie griffin, allie kaalmada torrie, joaquin fisher. So [facility] [phone].    ATENCIÓN: Si habla español, tiene a starr disposición servicios gratuitos de " asistencia lingüística. Devon al [phone].    We comply with applicable federal civil rights laws and [place] laws. We do not discriminate on the basis of race, color, national origin, age, disability, sex, sexual orientation, or gender identity.            Thank you!     Thank you for choosing [facility]  for your care. Our goal is always to provide you with excellent care. Hearing back from our patients is one way we can continue to improve our services. Please take a few minutes to complete the written survey that you may receive in the mail after your visit with us. Thank you!             Your Updated Medication List - Protect others around you: Learn how to safely use, store and throw away your medicines at www.disposemymeds.org.          This list is accurate as of 2/15/18 10:26 AM.  Always use your most recent med list.                   Brand Name Dispense Instructions for use Diagnosis    aspirin 81 MG EC tablet      Take 1 tablet by mouth daily.    Atrial fibrillation with RVR (H), PVD (peripheral vascular disease) (H)       GARLIC PO      Take 2 tablets by mouth daily        Multi-vitamin Tabs tablet   Generic drug:  multivitamin, therapeutic with minerals     100 tablet    Take  by mouth. Equaline MVI - 1 daily        omega-3 fatty acids 1200 MG capsule      Take 1 capsule by mouth 2 times daily.        sertraline 50 MG tablet    ZOLOFT    90 tablet    Take 1 tablet (50 mg) by mouth daily Take 1 tablet by mouth daily Profile Rx: patient will contact pharmacy when needed    Anxiety       UNABLE TO FIND      MEDICATION NAME: Artichoke leaves capsule from [employer], started one yr to help with lowering cholesterol recommended by her nephew

## 2018-02-15 NOTE — NURSING NOTE
"Chief Complaint   Patient presents with     Establish Care     Diarrhea       Initial BP 90/58 (BP Location: Left arm, Patient Position: Sitting, Cuff Size: Adult Regular)  Pulse 62  Temp 97.5  F (36.4  C) (Oral)  Ht 5' 4.75\" (1.645 m)  Wt 129 lb 8 oz (58.7 kg)  SpO2 96%  BMI 21.72 kg/m2 Estimated body mass index is 21.72 kg/(m^2) as calculated from the following:    Height as of this encounter: 5' 4.75\" (1.645 m).    Weight as of this encounter: 129 lb 8 oz (58.7 kg).  Medication Reconciliation: complete    "

## 2018-02-15 NOTE — PATIENT INSTRUCTIONS
PREVENTIVE HEALTH RECOMMENDATIONS:     Vaccines: Get a flu shot each year. Get a tetanus shot every 10 years.     Exercise for at least 150 minutes a week (an average of 30 minutes a day, 5 days of the week). This will help you control your weight and prevent disease.    Limit alcohol to one drink per day.    No smoking.     Wear sunscreen to prevent skin cancer.     See your dentist twice a year for an exam and cleaning.    Try to get Calcium 1200 mg total per day. It is best to not take it all at once. Try to get Vitamin D at least 200 units per day.    BMI or Body Mass Index is a way of indicating weight and health risk for cardiovascular diseases, high blood pressure, diabetes.   Definitions:    Underweight is less than 18.5 and will be associated with health risk.   Normal BMI is 18.5 to 25   Overweight is 25-29   Obesity is 30 or greater   Morbid Obesity is 40 or greater or 35 or greater with diabetes, high blood pressure or elevated cholesterol  Obesity and Morbid Obesity are associated with higher health risks. Lowering calories, exercising more may lower your BMI and even small decreases can have positive impact on lowering health risks.   Your Body mass index is 21.72 kg/(m^2)..

## 2018-02-16 LAB
ANION GAP SERPL CALCULATED.3IONS-SCNC: 8 MMOL/L (ref 3–14)
BUN SERPL-MCNC: 24 MG/DL (ref 7–30)
CALCIUM SERPL-MCNC: 9.4 MG/DL (ref 8.5–10.1)
CHLORIDE SERPL-SCNC: 103 MMOL/L (ref 94–109)
CHOLEST SERPL-MCNC: 223 MG/DL
CO2 SERPL-SCNC: 25 MMOL/L (ref 20–32)
CREAT SERPL-MCNC: 0.81 MG/DL (ref 0.52–1.04)
GFR SERPL CREATININE-BSD FRML MDRD: 67 ML/MIN/1.7M2
GLUCOSE SERPL-MCNC: 101 MG/DL (ref 70–99)
HDLC SERPL-MCNC: 78 MG/DL
LDLC SERPL CALC-MCNC: 133 MG/DL
NONHDLC SERPL-MCNC: 145 MG/DL
POTASSIUM SERPL-SCNC: 4.4 MMOL/L (ref 3.4–5.3)
SODIUM SERPL-SCNC: 136 MMOL/L (ref 133–144)
TRIGL SERPL-MCNC: 61 MG/DL

## 2018-02-16 ASSESSMENT — ANXIETY QUESTIONNAIRES: GAD7 TOTAL SCORE: 0

## 2018-03-07 ENCOUNTER — RADIANT APPOINTMENT (OUTPATIENT)
Dept: BONE DENSITY | Facility: CLINIC | Age: 83
End: 2018-03-07
Attending: INTERNAL MEDICINE
Payer: COMMERCIAL

## 2018-03-07 DIAGNOSIS — Z78.0 ASYMPTOMATIC POSTMENOPAUSAL STATUS: ICD-10-CM

## 2018-03-07 DIAGNOSIS — M85.80 OSTEOPENIA, UNSPECIFIED LOCATION: ICD-10-CM

## 2018-03-07 PROCEDURE — 77080 DXA BONE DENSITY AXIAL: CPT | Performed by: INTERNAL MEDICINE

## 2018-03-08 ENCOUNTER — MYC MEDICAL ADVICE (OUTPATIENT)
Dept: INTERNAL MEDICINE | Facility: CLINIC | Age: 83
End: 2018-03-08

## 2018-03-08 DIAGNOSIS — E78.5 HYPERLIPIDEMIA LDL GOAL <70: Primary | ICD-10-CM

## 2018-03-08 DIAGNOSIS — I77.9 BILATERAL CAROTID ARTERY DISEASE (H): ICD-10-CM

## 2018-03-08 RX ORDER — PRAVASTATIN SODIUM 10 MG
10 TABLET ORAL DAILY
Qty: 90 TABLET | Refills: 1 | Status: SHIPPED | OUTPATIENT
Start: 2018-03-08 | End: 2018-05-14 | Stop reason: DRUGHIGH

## 2018-03-08 NOTE — TELEPHONE ENCOUNTER
Pt sent a message back.     The LDL is elevated. Because you have artery disease, you need to be on a statin. Your LDL should be under 70. You could go back on Welchol or use pravastatin which is a very mild, well tolerated statin that does not have as much impact on liver as any of the other statins. Send a message letting me know what you prefer.

## 2018-04-05 ENCOUNTER — OFFICE VISIT (OUTPATIENT)
Dept: INTERNAL MEDICINE | Facility: CLINIC | Age: 83
End: 2018-04-05
Payer: COMMERCIAL

## 2018-04-05 VITALS
OXYGEN SATURATION: 97 % | BODY MASS INDEX: 22.12 KG/M2 | SYSTOLIC BLOOD PRESSURE: 148 MMHG | HEART RATE: 92 BPM | RESPIRATION RATE: 16 BRPM | TEMPERATURE: 98.3 F | DIASTOLIC BLOOD PRESSURE: 62 MMHG | WEIGHT: 131.9 LBS

## 2018-04-05 DIAGNOSIS — M85.80 OSTEOPENIA, UNSPECIFIED LOCATION: Primary | ICD-10-CM

## 2018-04-05 PROCEDURE — 99214 OFFICE O/P EST MOD 30 MIN: CPT | Performed by: INTERNAL MEDICINE

## 2018-04-05 RX ORDER — ALENDRONATE SODIUM 70 MG/1
TABLET ORAL
Qty: 12 TABLET | Refills: 3 | Status: SHIPPED | OUTPATIENT
Start: 2018-04-05 | End: 2019-01-14

## 2018-04-05 NOTE — PROGRESS NOTES
SUBJECTIVE:   Jennifer Whiteside is a 83 year old female who presents to clinic today for the following health issues:      Subjective:  Jennifer Whiteside is a 83 year old female who presents for follow up of abnormal DEXA. She is postmenopausal.   Other risks :parent with hip fracture   .    Patient Active Problem List   Diagnosis     Generalized osteoarthrosis, unspecified site     STOMACH FUNCTION DIS NEC(aka DYSPEPSIA)     Nonspecific abnormal results of liver function study     Hyperlipidemia LDL goal <70     Anxiety     Advance Care Planning     Carotid artery disease (H)     Right carotid bruit      Current Outpatient Prescriptions   Medication Sig Dispense Refill     alendronate (FOSAMAX) 70 MG tablet 1 tablet weekly with 8 oz water. 12 tablet 3     pravastatin (PRAVACHOL) 10 MG tablet Take 1 tablet (10 mg) by mouth daily 90 tablet 1     UNABLE TO FIND MEDICATION NAME: Artichoke leaves capsule from [employer], started one yr to help with lowering cholesterol recommended by her nephew       GARLIC PO Take 2 tablets by mouth daily       sertraline (ZOLOFT) 50 MG tablet Take 1 tablet (50 mg) by mouth daily Take 1 tablet by mouth daily Profile Rx: patient will contact pharmacy when needed 90 tablet 3     Multiple Vitamin (MULTI-VITAMIN) per tablet Take  by mouth. Equaline MVI - 1 daily 100 tablet 3     aspirin EC 81 MG EC tablet Take 1 tablet by mouth daily.       Omega-3 Fatty Acids (FISH OIL) 1200 MG capsule Take 1 capsule by mouth 2 times daily.          Other concerns: none    Objective:  Patient alert in NAD  /62  Pulse 92  Temp 98.3  F (36.8  C) (Oral)  Resp 16  Wt 131 lb 14.4 oz (59.8 kg)  SpO2 97%  BMI 22.12 kg/m2      Not examined.    T-score -2.1  Total fracture risk: 22%  Hip fracture risk: 6.6 %    Assessment/Plan:  1. Osteopenia: discussed results of DEXA, etiology and definitions of osteopenia and osteoporosis, risks, treatments. Recommend fosamax. Advised of  potential side effects of medications and recommend she contact me for significant reactions.       Carisa Waggoner MD  [facility]     30 minutes spent with the patient, >50% of time spent counseling

## 2018-04-05 NOTE — NURSING NOTE
"Chief Complaint   Patient presents with     RECHECK     DEXA scan follow up, discuss lkow calcium count        Initial /62  Pulse 92  Temp 98.3  F (36.8  C) (Oral)  Resp 16  Wt 131 lb 14.4 oz (59.8 kg)  SpO2 97%  BMI 22.12 kg/m2 Estimated body mass index is 22.12 kg/(m^2) as calculated from the following:    Height as of 2/15/18: 5' 4.75\" (1.645 m).    Weight as of this encounter: 131 lb 14.4 oz (59.8 kg).  Medication Reconciliation: complete      Rgahu Mckeon, REED      "

## 2018-04-05 NOTE — MR AVS SNAPSHOT
After Visit Summary   4/5/2018    Jennifer Whiteside    MRN: [id number]           Patient Information     Date Of Birth          1935        Visit Information        Provider Department      4/5/2018 3:20 PM Carisa Waggoner MD [facility]        Today's Diagnoses     Osteopenia, unspecified location    -  1      Care Instructions    Take one calcium supplement a day or 1 good size dairy serving per day. Total 1200 mg per day.   Try to get Vitamin D 1106-2249 units per day.   You may be getting about 200 units in the milk.     Take calcium and Vitamin D for 1-2 weeks before starting the Fosamax (alendronate).           Follow-ups after your visit        Who to contact     If you have questions or need follow up information about today's clinic visit or your schedule please contact [facility] directly at [phone].  Normal or non-critical lab and imaging results will be communicated to you by DropShiphart, letter or phone within 4 business days after the clinic has received the results. If you do not hear from us within 7 days, please contact the clinic through DropShiphart or phone. If you have a critical or abnormal lab result, we will notify you by phone as soon as possible.  Submit refill requests through Netgen or call your pharmacy and they will forward the refill request to us. Please allow 3 business days for your refill to be completed.          Additional Information About Your Visit        MyChart Information     Netgen gives you secure access to your electronic health record. If you see a primary care provider, you can also send messages to your care team and make appointments. If you have questions, please call your primary care clinic.  If you do not have a primary care provider, please call [phone] and they will assist you.        Care EveryWhere ID     This is your Care EveryWhere ID. This could be used by other organizations to access your  [place] medical records  [id number]        Your Vitals Were     Pulse Temperature Respirations Pulse Oximetry BMI (Body Mass Index)       92 98.3  F (36.8  C) (Oral) 16 97% 22.12 kg/m2        Blood Pressure from Last 3 Encounters:   04/05/18 148/62   02/15/18 90/58   12/07/17 108/55    Weight from Last 3 Encounters:   04/05/18 131 lb 14.4 oz (59.8 kg)   02/15/18 129 lb 8 oz (58.7 kg)   12/07/17 129 lb 12.8 oz (58.9 kg)              Today, you had the following     No orders found for display         Today's Medication Changes          These changes are accurate as of 4/5/18  4:24 PM.  If you have any questions, ask your nurse or doctor.               Start taking these medicines.        Dose/Directions    alendronate 70 MG tablet   Commonly known as:  FOSAMAX   Used for:  Osteopenia, unspecified location   Started by:  Carisa Waggoner MD        1 tablet weekly with 8 oz water.   Quantity:  12 tablet   Refills:  3            Where to get your medicines      These medications were sent to [id number] - [place], [place] - [address]  [address] [address]     Phone:  [phone]     alendronate 70 MG tablet                Primary Care Provider Office Phone # Fax #    Carisa Waggoner [phone]       [address] [facility] [address]  [facility] [address]        Equal Access to Services     [facility] Hospital[facility] AH: Hadii aad ku hadasho Soomaali, waaxda luqadaha, qaybta kaalmada adeegyada, waxpatrick moses fisher. So [facility] [phone].    ATENCIÓN: Si habla español, tiene a starr disposición servicios gratuitos de asistencia lingüística. Llcleo al [phone].    We comply with applicable federal civil rights laws and [place] laws. We do not discriminate on the basis of race, color, national origin, age, disability, sex, sexual orientation, or gender identity.            Thank you!     Thank you for choosing [facility]  for your care. Our goal is always to provide  you with excellent care. Hearing back from our patients is one way we can continue to improve our services. Please take a few minutes to complete the written survey that you may receive in the mail after your visit with us. Thank you!             Your Updated Medication List - Protect others around you: Learn how to safely use, store and throw away your medicines at www.disposemymeds.org.          This list is accurate as of 4/5/18  4:24 PM.  Always use your most recent med list.                   Brand Name Dispense Instructions for use Diagnosis    alendronate 70 MG tablet    FOSAMAX    12 tablet    1 tablet weekly with 8 oz water.    Osteopenia, unspecified location       aspirin 81 MG EC tablet      Take 1 tablet by mouth daily.    Atrial fibrillation with RVR (H), PVD (peripheral vascular disease) (H)       GARLIC PO      Take 2 tablets by mouth daily        Multi-vitamin Tabs tablet   Generic drug:  multivitamin, therapeutic with minerals     100 tablet    Take  by mouth. Equaline MVI - 1 daily        omega-3 fatty acids 1200 MG capsule      Take 1 capsule by mouth 2 times daily.        pravastatin 10 MG tablet    PRAVACHOL    90 tablet    Take 1 tablet (10 mg) by mouth daily    Hyperlipidemia LDL goal <70, Bilateral carotid artery disease (H)       sertraline 50 MG tablet    ZOLOFT    90 tablet    Take 1 tablet (50 mg) by mouth daily Take 1 tablet by mouth daily Profile Rx: patient will contact pharmacy when needed    Anxiety       UNABLE TO FIND      MEDICATION NAME: Artichoke leaves capsule from [employer], started one yr to help with lowering cholesterol recommended by her nephew

## 2018-04-05 NOTE — PATIENT INSTRUCTIONS
Take one calcium supplement a day or 1 good size dairy serving per day. Total 1200 mg per day.   Try to get Vitamin D 7592-0498 units per day.   You may be getting about 200 units in the milk.     Take calcium and Vitamin D for 1-2 weeks before starting the Fosamax (alendronate).

## 2018-05-04 DIAGNOSIS — I77.9 BILATERAL CAROTID ARTERY DISEASE (H): ICD-10-CM

## 2018-05-04 DIAGNOSIS — E78.5 HYPERLIPIDEMIA LDL GOAL <70: ICD-10-CM

## 2018-05-04 LAB
ALBUMIN SERPL-MCNC: 4.2 G/DL (ref 3.4–5)
ALP SERPL-CCNC: 79 U/L (ref 40–150)
ALT SERPL W P-5'-P-CCNC: 19 U/L (ref 0–50)
AST SERPL W P-5'-P-CCNC: 12 U/L (ref 0–45)
BILIRUB DIRECT SERPL-MCNC: 0.3 MG/DL (ref 0–0.2)
BILIRUB SERPL-MCNC: 0.9 MG/DL (ref 0.2–1.3)
CHOLEST SERPL-MCNC: 180 MG/DL
HDLC SERPL-MCNC: 67 MG/DL
LDLC SERPL CALC-MCNC: 102 MG/DL
NONHDLC SERPL-MCNC: 113 MG/DL
PROT SERPL-MCNC: 7.4 G/DL (ref 6.8–8.8)
TRIGL SERPL-MCNC: 56 MG/DL

## 2018-05-04 PROCEDURE — 80076 HEPATIC FUNCTION PANEL: CPT | Performed by: INTERNAL MEDICINE

## 2018-05-04 PROCEDURE — 80061 LIPID PANEL: CPT | Performed by: INTERNAL MEDICINE

## 2018-05-04 PROCEDURE — 36415 COLL VENOUS BLD VENIPUNCTURE: CPT | Performed by: INTERNAL MEDICINE

## 2018-05-13 ENCOUNTER — TELEPHONE (OUTPATIENT)
Dept: INTERNAL MEDICINE | Facility: CLINIC | Age: 83
End: 2018-05-13

## 2018-05-13 DIAGNOSIS — E78.5 HYPERLIPIDEMIA LDL GOAL <70: Primary | ICD-10-CM

## 2018-05-14 RX ORDER — PRAVASTATIN SODIUM 40 MG
40 TABLET ORAL DAILY
Qty: 90 TABLET | Refills: 1 | Status: SHIPPED | OUTPATIENT
Start: 2018-05-14 | End: 2018-11-08

## 2018-05-14 NOTE — TELEPHONE ENCOUNTER
Please call and advise that the LDL has decreased with the pravastatin and the liver tests are normal. The LDL is still above goal. If she is tolerating it well I recommend she increase it to 20 mg and recheck labs in 2 months. I can order the 20 mg dose. Send back to me to do orders after you speak with her

## 2018-05-14 NOTE — TELEPHONE ENCOUNTER
Pt informed of PCP's message below.    States no SE from med and is willing to incr to 20mg tabs.  Please send rx.     Also, is a hepatic panel needed with the 2 mo f/u lipid panel?  If so, please place future order.    (Future lipid panel entered into chart.)    No need to call pt back.

## 2018-05-15 DIAGNOSIS — F41.9 ANXIETY: ICD-10-CM

## 2018-05-15 NOTE — TELEPHONE ENCOUNTER
"Requested Prescriptions   Pending Prescriptions Disp Refills     sertraline (ZOLOFT) 50 MG tablet [Pharmacy Med Name: Sertraline HCl Oral Tablet 50 MG]  Last Written Prescription Date:  2/13/17  Last Fill Quantity: 90,  # refills: 3   Last office visit: No previous visit found with prescribing provider:  No previous visit found     Future Office Visit:     90 tablet 2     Sig: TAKE ONE TABLET BY MOUTH ONE TIME DAILY    SSRIs Protocol Passed    5/15/2018  8:44 AM       Passed - Recent (12 mo) or future (30 days) visit within the authorizing provider's specialty    Patient had office visit in the last 12 months or has a visit in the next 30 days with authorizing provider or within the authorizing provider's specialty.  See \"Patient Info\" tab in inbasket, or \"Choose Columns\" in Meds & Orders section of the refill encounter.           Passed - Patient is age 18 or older       Passed - No active pregnancy on record       Passed - No positive pregnancy test in last 12 months          "

## 2018-05-16 NOTE — TELEPHONE ENCOUNTER
"Requested Prescriptions   Pending Prescriptions Disp Refills     sertraline (ZOLOFT) 50 MG tablet [Pharmacy Med Name: Sertraline HCl Oral Tablet 50 MG] 90 tablet 2     Sig: TAKE ONE TABLET BY MOUTH ONE TIME DAILY    SSRIs Protocol Passed    5/16/2018  2:06 PM       Passed - Recent (12 mo) or future (30 days) visit within the authorizing provider's specialty    Patient had office visit in the last 12 months or has a visit in the next 30 days with authorizing provider or within the authorizing provider's specialty.  See \"Patient Info\" tab in inbasket, or \"Choose Columns\" in Meds & Orders section of the refill encounter.           Passed - Patient is age 18 or older       Passed - No active pregnancy on record       Passed - No positive pregnancy test in last 12 months          Prescription approved per [facility] Refill Protocol.    "

## 2018-07-09 ENCOUNTER — MYC MEDICAL ADVICE (OUTPATIENT)
Dept: INTERNAL MEDICINE | Facility: CLINIC | Age: 83
End: 2018-07-09

## 2018-07-11 DIAGNOSIS — E78.5 HYPERLIPIDEMIA LDL GOAL <70: ICD-10-CM

## 2018-07-11 LAB
ALBUMIN SERPL-MCNC: 4.1 G/DL (ref 3.4–5)
ALP SERPL-CCNC: 81 U/L (ref 40–150)
ALT SERPL W P-5'-P-CCNC: 18 U/L (ref 0–50)
AST SERPL W P-5'-P-CCNC: 12 U/L (ref 0–45)
BILIRUB DIRECT SERPL-MCNC: 0.2 MG/DL (ref 0–0.2)
BILIRUB SERPL-MCNC: 0.8 MG/DL (ref 0.2–1.3)
CHOLEST SERPL-MCNC: 155 MG/DL
HDLC SERPL-MCNC: 72 MG/DL
LDLC SERPL CALC-MCNC: 71 MG/DL
NONHDLC SERPL-MCNC: 83 MG/DL
PROT SERPL-MCNC: 7.3 G/DL (ref 6.8–8.8)
TRIGL SERPL-MCNC: 62 MG/DL

## 2018-07-11 PROCEDURE — 36415 COLL VENOUS BLD VENIPUNCTURE: CPT | Performed by: INTERNAL MEDICINE

## 2018-07-11 PROCEDURE — 80076 HEPATIC FUNCTION PANEL: CPT | Performed by: INTERNAL MEDICINE

## 2018-07-11 PROCEDURE — 80061 LIPID PANEL: CPT | Performed by: INTERNAL MEDICINE

## 2018-11-08 DIAGNOSIS — E78.5 HYPERLIPIDEMIA LDL GOAL <70: ICD-10-CM

## 2018-11-08 DIAGNOSIS — F41.9 ANXIETY: ICD-10-CM

## 2018-11-08 NOTE — TELEPHONE ENCOUNTER
"Requested Prescriptions   Pending Prescriptions Disp Refills     pravastatin (PRAVACHOL) 40 MG tablet [Pharmacy Med Name: Pravastatin Sodium Oral Tablet 40 MG]  Last Written Prescription Date:  5/14/2018  Last Fill Quantity: 90 tablet,  # refills: 1   Last office visit: 4/5/2018 with prescribing provider:  Carisa Waggoner   Future Office Visit:     90 tablet 0     Sig: TAKE ONE TABLET BY MOUTH ONE TIME DAILY    Statins Protocol Passed    11/8/2018  9:18 AM       Passed - LDL on file in past 12 months    Recent Labs   Lab Test  07/11/18   0903   LDL  71          Passed - No abnormal creatine kinase in past 12 months    No lab results found.        Passed - Recent (12 mo) or future (30 days) visit within the authorizing provider's specialty    Patient had office visit in the last 12 months or has a visit in the next 30 days with authorizing provider or within the authorizing provider's specialty.  See \"Patient Info\" tab in inbasket, or \"Choose Columns\" in Meds & Orders section of the refill encounter.             Passed - Patient is age 18 or older       Passed - No active pregnancy on record       Passed - No positive pregnancy test in past 12 months            sertraline (ZOLOFT) 50 MG tablet [Pharmacy Med Name: Sertraline HCl Oral Tablet 50 MG]  Last Written Prescription Date:  5/16/2018  Last Fill Quantity: 90 tablet,  # refills: 2   Last office visit: 4/5/2018 with prescribing provider:  Carisa Waggoner   Future Office Visit:     90 tablet 1     Sig: TAKE ONE TABLET BY MOUTH ONE TIME DAILY    SSRIs Protocol Passed    11/8/2018  9:18 AM  PHQ-9 SCORE 4/17/2012 8/12/2014 8/13/2014   Total Score 1 0 -   Total Score MyChart - - 0     LEE-7 SCORE 4/19/2012 8/13/2014 2/15/2018   Total Score 1 0 -   Total Score - 0 -   Total Score - - 0            Passed - Recent (12 mo) or future (30 days) visit within the authorizing provider's specialty    Patient had office visit in the last 12 months or has a visit in the next 30 days " "with authorizing provider or within the authorizing provider's specialty.  See \"Patient Info\" tab in inbasket, or \"Choose Columns\" in Meds & Orders section of the refill encounter.           Passed - Patient is age 18 or older       Passed - No active pregnancy on record       Passed - No positive pregnancy test in last 12 months          "

## 2018-11-10 RX ORDER — PRAVASTATIN SODIUM 40 MG
TABLET ORAL
Qty: 90 TABLET | Refills: 1 | Status: SHIPPED | OUTPATIENT
Start: 2018-11-10 | End: 2019-01-14

## 2018-11-13 ENCOUNTER — TRANSFERRED RECORDS (OUTPATIENT)
Dept: HEALTH INFORMATION MANAGEMENT | Facility: CLINIC | Age: 83
End: 2018-11-13

## 2019-01-14 ENCOUNTER — OFFICE VISIT (OUTPATIENT)
Dept: INTERNAL MEDICINE | Facility: CLINIC | Age: 84
End: 2019-01-14
Payer: COMMERCIAL

## 2019-01-14 VITALS
DIASTOLIC BLOOD PRESSURE: 80 MMHG | WEIGHT: 128 LBS | RESPIRATION RATE: 16 BRPM | HEIGHT: 64 IN | HEART RATE: 94 BPM | SYSTOLIC BLOOD PRESSURE: 110 MMHG | OXYGEN SATURATION: 96 % | TEMPERATURE: 97.6 F | BODY MASS INDEX: 21.85 KG/M2

## 2019-01-14 DIAGNOSIS — Z13.1 SCREENING FOR DIABETES MELLITUS: ICD-10-CM

## 2019-01-14 DIAGNOSIS — I48.0 PAROXYSMAL ATRIAL FIBRILLATION (H): ICD-10-CM

## 2019-01-14 DIAGNOSIS — E78.5 HYPERLIPIDEMIA LDL GOAL <70: Primary | ICD-10-CM

## 2019-01-14 DIAGNOSIS — I65.23 BILATERAL CAROTID ARTERY STENOSIS: ICD-10-CM

## 2019-01-14 DIAGNOSIS — F41.9 ANXIETY: ICD-10-CM

## 2019-01-14 DIAGNOSIS — M85.80 OSTEOPENIA, UNSPECIFIED LOCATION: ICD-10-CM

## 2019-01-14 PROCEDURE — 36415 COLL VENOUS BLD VENIPUNCTURE: CPT | Performed by: INTERNAL MEDICINE

## 2019-01-14 PROCEDURE — 99214 OFFICE O/P EST MOD 30 MIN: CPT | Performed by: INTERNAL MEDICINE

## 2019-01-14 PROCEDURE — 80048 BASIC METABOLIC PNL TOTAL CA: CPT | Performed by: INTERNAL MEDICINE

## 2019-01-14 PROCEDURE — 80061 LIPID PANEL: CPT | Performed by: INTERNAL MEDICINE

## 2019-01-14 RX ORDER — ALENDRONATE SODIUM 70 MG/1
TABLET ORAL
Qty: 12 TABLET | Refills: 3 | Status: SHIPPED | OUTPATIENT
Start: 2019-01-14 | End: 2020-02-20

## 2019-01-14 RX ORDER — PRAVASTATIN SODIUM 40 MG
40 TABLET ORAL DAILY
Qty: 90 TABLET | Refills: 3 | Status: SHIPPED | OUTPATIENT
Start: 2019-01-14 | End: 2020-01-06

## 2019-01-14 ASSESSMENT — MIFFLIN-ST. JEOR: SCORE: 1029.57

## 2019-01-14 NOTE — PROGRESS NOTES
SUBJECTIVE:                                                    Jennifer Whiteside is a 83 year old female who presents to clinic today for the following health issues:        Hyperlipidemia Follow-Up      Rate your low fat/cholesterol diet?: good    Taking statin?  Yes, no muscle aches from statin    Other lipid medications/supplements?:  none      Amount of exercise or physical activity: None    Problems taking medications regularly: No    Medication side effects: none    Diet: low fat/cholesterol    Other problems:   1.  Atrial fibrillation: She has very infrequent episodes, no associated symptoms.  2.  Artery stenosis: No acute neurologic symptoms, about 14 months ago  3.  Osteopenia: She is tolerating Fosamax without problems.    Current Concerns:   none    Patient Active Problem List   Diagnosis     Generalized osteoarthrosis, unspecified site     STOMACH FUNCTION DIS NEC(aka DYSPEPSIA)     Hyperlipidemia LDL goal <70     Anxiety     Advance Care Planning     Carotid artery disease (H)     Paroxysmal atrial fibrillation (H)       Current Outpatient Medications   Medication Sig Dispense Refill     alendronate (FOSAMAX) 70 MG tablet 1 tablet weekly with 8 oz water. 12 tablet 3     aspirin EC 81 MG EC tablet Take 1 tablet by mouth daily.       GARLIC PO Take 2 tablets by mouth daily       Multiple Vitamin (MULTI-VITAMIN) per tablet Take  by mouth. Equaline MVI - 1 daily 100 tablet 3     Omega-3 Fatty Acids (FISH OIL) 1200 MG capsule Take 1 capsule by mouth 2 times daily.       pravastatin (PRAVACHOL) 40 MG tablet TAKE ONE TABLET BY MOUTH ONE TIME DAILY  90 tablet 1     sertraline (ZOLOFT) 50 MG tablet TAKE ONE TABLET BY MOUTH ONE TIME DAILY  90 tablet 1     UNABLE TO FIND MEDICATION NAME: Artichoke leaves capsule from [employer], started one yr to help with lowering cholesterol recommended by her nephew           Social History     Tobacco Use     Smoking status: Former Smoker     Packs/day: 0.50      "Years: 3.00     Pack years: 1.50     Types: Cigarettes     Smokeless tobacco: Never Used     Tobacco comment: quit 1958   Substance Use Topics     Alcohol use: Yes     Comment: occ wine        ROS:  No fever, chills, no dyspnea on exertion, no chest pain, palpitations, PND, orthopnea, edema, syncope, headache, abdominal pain     OBJECTIVE:     /80   Pulse 94   Temp 97.6  F (36.4  C) (Oral)   Resp 16   Ht 1.632 m (5' 4.25\")   Wt 58.1 kg (128 lb)   SpO2 96%   BMI 21.80 kg/m    Body mass index is 21.8 kg/m .    Lungs: clear  CV: normal S1, S2 without murmur, S3 or S4.   No edema  Pulses full, stable soft right carotid bruit      ASSESSMENT/PLAN:             1. Hyperlipidemia LDL goal <70  recheck  - Lipid panel reflex to direct LDL Fasting  - pravastatin (PRAVACHOL) 40 MG tablet; Take 1 tablet (40 mg) by mouth daily  Dispense: 90 tablet; Refill: 3    2. Paroxysmal atrial fibrillation (H)  Infrequent episodes, stable, no treatment    3. Bilateral carotid artery stenosis  Exam without significant change, recheck ultrasound next year    4. Anxiety  Stable, continue med  - sertraline (ZOLOFT) 50 MG tablet; Take 1 tablet (50 mg) by mouth daily  Dispense: 90 tablet; Refill: 3    5. Osteopenia, unspecified location  Stable, DXA 1 year  - alendronate (FOSAMAX) 70 MG tablet; 1 tablet weekly with 8 oz water.  Dispense: 12 tablet; Refill: 3    6. Screening for diabetes mellitus    - Basic metabolic panel  (Ca, Cl, CO2, Creat, Gluc, K, Na, BUN)        Carisa Waggoner MD  [facility]      "

## 2019-01-15 LAB
ANION GAP SERPL CALCULATED.3IONS-SCNC: 9 MMOL/L (ref 3–14)
BUN SERPL-MCNC: 11 MG/DL (ref 7–30)
CALCIUM SERPL-MCNC: 8.9 MG/DL (ref 8.5–10.1)
CHLORIDE SERPL-SCNC: 103 MMOL/L (ref 94–109)
CHOLEST SERPL-MCNC: 168 MG/DL
CO2 SERPL-SCNC: 25 MMOL/L (ref 20–32)
CREAT SERPL-MCNC: 0.58 MG/DL (ref 0.52–1.04)
GFR SERPL CREATININE-BSD FRML MDRD: 86 ML/MIN/{1.73_M2}
GLUCOSE SERPL-MCNC: 95 MG/DL (ref 70–99)
HDLC SERPL-MCNC: 72 MG/DL
LDLC SERPL CALC-MCNC: 83 MG/DL
NONHDLC SERPL-MCNC: 96 MG/DL
POTASSIUM SERPL-SCNC: 4.3 MMOL/L (ref 3.4–5.3)
SODIUM SERPL-SCNC: 137 MMOL/L (ref 133–144)
TRIGL SERPL-MCNC: 66 MG/DL

## 2019-01-18 ENCOUNTER — TRANSFERRED RECORDS (OUTPATIENT)
Dept: HEALTH INFORMATION MANAGEMENT | Facility: CLINIC | Age: 84
End: 2019-01-18

## 2019-01-21 ENCOUNTER — TRANSFERRED RECORDS (OUTPATIENT)
Dept: HEALTH INFORMATION MANAGEMENT | Facility: CLINIC | Age: 84
End: 2019-01-21

## 2019-01-21 ENCOUNTER — HOSPITAL ENCOUNTER (OUTPATIENT)
Dept: GENERAL RADIOLOGY | Facility: CLINIC | Age: 84
Discharge: HOME | End: 2019-01-21
Attending: INTERNAL MEDICINE | Admitting: INTERNAL MEDICINE
Payer: COMMERCIAL

## 2019-01-21 DIAGNOSIS — R13.10 DYSPHAGIA, UNSPECIFIED TYPE: ICD-10-CM

## 2019-01-21 PROCEDURE — 25500045 ZZH RX 255: Performed by: INTERNAL MEDICINE

## 2019-01-21 PROCEDURE — 74220 X-RAY XM ESOPHAGUS 1CNTRST: CPT

## 2019-01-21 RX ADMIN — ANTACID/ANTIFLATULENT 4 G: 380; 550; 10; 10 GRANULE, EFFERVESCENT ORAL at 08:37

## 2019-08-01 DIAGNOSIS — I65.23 BILATERAL CAROTID ARTERY STENOSIS: Primary | ICD-10-CM

## 2019-08-01 DIAGNOSIS — Z98.890 HISTORY OF CAROTID ENDARTERECTOMY: ICD-10-CM

## 2019-09-03 ENCOUNTER — OFFICE VISIT (OUTPATIENT)
Dept: INTERNAL MEDICINE | Facility: CLINIC | Age: 84
End: 2019-09-03
Payer: COMMERCIAL

## 2019-09-03 VITALS
BODY MASS INDEX: 21.85 KG/M2 | TEMPERATURE: 97.9 F | HEART RATE: 85 BPM | DIASTOLIC BLOOD PRESSURE: 78 MMHG | SYSTOLIC BLOOD PRESSURE: 122 MMHG | OXYGEN SATURATION: 97 % | HEIGHT: 64 IN | WEIGHT: 128 LBS | RESPIRATION RATE: 16 BRPM

## 2019-09-03 DIAGNOSIS — H81.10 BENIGN PAROXYSMAL POSITIONAL VERTIGO, UNSPECIFIED LATERALITY: ICD-10-CM

## 2019-09-03 DIAGNOSIS — I48.0 PAROXYSMAL ATRIAL FIBRILLATION (H): ICD-10-CM

## 2019-09-03 DIAGNOSIS — R53.83 FATIGUE, UNSPECIFIED TYPE: Primary | ICD-10-CM

## 2019-09-03 LAB
BASOPHILS # BLD AUTO: 0 10E9/L (ref 0–0.2)
BASOPHILS NFR BLD AUTO: 0.5 %
DIFFERENTIAL METHOD BLD: ABNORMAL
EOSINOPHIL # BLD AUTO: 0.1 10E9/L (ref 0–0.7)
EOSINOPHIL NFR BLD AUTO: 2 %
ERYTHROCYTE [DISTWIDTH] IN BLOOD BY AUTOMATED COUNT: 12 % (ref 10–15)
HCT VFR BLD AUTO: 36.9 % (ref 35–47)
HGB BLD-MCNC: 12.3 G/DL (ref 11.7–15.7)
LYMPHOCYTES # BLD AUTO: 1.6 10E9/L (ref 0.8–5.3)
LYMPHOCYTES NFR BLD AUTO: 26.4 %
MCH RBC QN AUTO: 33.2 PG (ref 26.5–33)
MCHC RBC AUTO-ENTMCNC: 33.3 G/DL (ref 31.5–36.5)
MCV RBC AUTO: 100 FL (ref 78–100)
MONOCYTES # BLD AUTO: 0.6 10E9/L (ref 0–1.3)
MONOCYTES NFR BLD AUTO: 9.9 %
NEUTROPHILS # BLD AUTO: 3.6 10E9/L (ref 1.6–8.3)
NEUTROPHILS NFR BLD AUTO: 61.2 %
PLATELET # BLD AUTO: 223 10E9/L (ref 150–450)
RBC # BLD AUTO: 3.7 10E12/L (ref 3.8–5.2)
WBC # BLD AUTO: 5.9 10E9/L (ref 4–11)

## 2019-09-03 PROCEDURE — 36415 COLL VENOUS BLD VENIPUNCTURE: CPT | Performed by: INTERNAL MEDICINE

## 2019-09-03 PROCEDURE — 99214 OFFICE O/P EST MOD 30 MIN: CPT | Performed by: INTERNAL MEDICINE

## 2019-09-03 PROCEDURE — 85025 COMPLETE CBC W/AUTO DIFF WBC: CPT | Performed by: INTERNAL MEDICINE

## 2019-09-03 PROCEDURE — 99207 C PAF COMPLETED  NO CHARGE: CPT | Performed by: INTERNAL MEDICINE

## 2019-09-03 PROCEDURE — 84443 ASSAY THYROID STIM HORMONE: CPT | Performed by: INTERNAL MEDICINE

## 2019-09-03 ASSESSMENT — MIFFLIN-ST. JEOR: SCORE: 1024.57

## 2019-09-03 NOTE — PROGRESS NOTES
Subjective     Jennifer Whiteside is a 84 year old female who presents to clinic today for the following health issues:    HPI   1. Complaints of fatigue: She states this is a lack of energy but also feels tired/sleepy.  This is been going on about 6 months.  Symptoms in the last few months are overall stable.  She finds that a small amount of activity will tire her out.  She feels like her legs are weak, one time the legs gave out and at other times she feels like they could give out.  She has some dyspnea on exertion after walking a moderate distance, will go away with rest time.  She usually gets fairly tired like she needs to lie down in a.m. and can fall asleep watching TV during the day.  Or later in the day.  She gets 8 hours of sleep and feels well rested first thing in the morning.  She has not had fever chills but she has had night sweats for 3 years that are fairly mild to moderate.  Not aware of enlarged lymph nodes, no chest pain, palpitations, has rare diarrhea which is stable, tends to have a little constipation, no nausea or vomiting.  She reports some decrease oral intake, she sometimes does not feel hungry but overall anorexia, tries to eat normally.  She does not feel depressed    2.  Vertigo: She had an episode 3 days ago in the shower.  It was with movement, went away after an hour.  She has had episodes occasionally for the last year to year and a half, maybe once a week at most    She brought in a couple of recent blood pressures: 137/82, 138/87, 131/87, 140/80  Patient Active Problem List   Diagnosis     Generalized osteoarthrosis, unspecified site     Dyspepsia     Hyperlipidemia LDL goal <70     Anxiety     Advance Care Planning     Bilateral carotid artery stenosis     Paroxysmal atrial fibrillation (H)     Current Outpatient Medications   Medication Sig Dispense Refill     alendronate (FOSAMAX) 70 MG tablet 1 tablet weekly with 8 oz water. 12 tablet 3     aspirin EC 81 MG EC tablet  "Take 1 tablet by mouth daily.       GARLIC PO Take 2 tablets by mouth daily       Multiple Vitamin (MULTI-VITAMIN) per tablet Take  by mouth. Equaline MVI - 1 daily 100 tablet 3     Omega-3 Fatty Acids (FISH OIL) 1200 MG capsule Take 1 capsule by mouth 2 times daily.       pravastatin (PRAVACHOL) 40 MG tablet Take 1 tablet (40 mg) by mouth daily 90 tablet 3     sertraline (ZOLOFT) 50 MG tablet Take 1 tablet (50 mg) by mouth daily 90 tablet 3      Reviewed and updated as needed this visit by Provider         Review of Systems   No fever, chills, sweats of night but not drenching, no weight loss or weight gain, no hair loss, no skin changes, mild dyspnea on exertion, no chest pain, palpitations, syncope, muscle pain, numbness, tingling, she does not feel weakness in her legs all the time, no weakness in the arms      Objective    /78   Pulse 85   Temp 97.9  F (36.6  C) (Oral)   Resp 16   Ht 1.632 m (5' 4.25\")   Wt 58.1 kg (128 lb)   SpO2 97%   BMI 21.80 kg/m    Body mass index is 21.8 kg/m .  Physical Exam     Neck: No lymphadenopathy or thyromegaly  Lungs: clear  CV: normal S1, S2 without murmur, S3 or S4.   Abdomen: Bowel sounds normal, soft, nontender. No hepatosplenomegaly. No masses.   No inguinal or axillary adenopathy  No edema  Lower extremity strength is 5/5            Assessment & Plan     1. Fatigue, unspecified type  Etiology is unclear but suggest possibility of a sleep disorder.  She has no physical findings or symptoms that really would suggest any cardio or pulmonary issues except for mild dyspnea on exertion.  Check lab and thyroid, consider sleep study.  - CBC with platelets and differential  - TSH with free T4 reflex    2. Paroxysmal atrial fibrillation (H)  She has infrequent episodes  - TSH with free T4 reflex    3. Benign paroxysmal positional vertigo, unspecified laterality  Advised this likely is benign positional vertigo related to shower.  Avoid sudden movements of her head, " given a handout with exercises             No follow-ups on file.    Carisa Waggoner MD  [facility]

## 2019-09-03 NOTE — NURSING NOTE
"Vital signs:  Temp: 97.9  F (36.6  C) Temp src: Oral BP: 122/78 Pulse: 85   Resp: 16 SpO2: 97 %     Height: 163.2 cm (5' 4.25\") Weight: 58.1 kg (128 lb)  Estimated body mass index is 21.8 kg/m  as calculated from the following:    Height as of this encounter: 1.632 m (5' 4.25\").    Weight as of this encounter: 58.1 kg (128 lb).          "

## 2019-09-04 LAB — TSH SERPL DL<=0.005 MIU/L-ACNC: 3.99 MU/L (ref 0.4–4)

## 2019-09-09 ASSESSMENT — ANXIETY QUESTIONNAIRES
5. BEING SO RESTLESS THAT IT IS HARD TO SIT STILL: NOT AT ALL
1. FEELING NERVOUS, ANXIOUS, OR ON EDGE: SEVERAL DAYS
6. BECOMING EASILY ANNOYED OR IRRITABLE: NOT AT ALL
GAD7 TOTAL SCORE: 2
2. NOT BEING ABLE TO STOP OR CONTROL WORRYING: NOT AT ALL
3. WORRYING TOO MUCH ABOUT DIFFERENT THINGS: NOT AT ALL
7. FEELING AFRAID AS IF SOMETHING AWFUL MIGHT HAPPEN: SEVERAL DAYS

## 2019-09-09 ASSESSMENT — PATIENT HEALTH QUESTIONNAIRE - PHQ9: 5. POOR APPETITE OR OVEREATING: NOT AT ALL

## 2019-09-10 ASSESSMENT — ANXIETY QUESTIONNAIRES: GAD7 TOTAL SCORE: 2

## 2019-10-10 ENCOUNTER — HOSPITAL ENCOUNTER (OUTPATIENT)
Dept: ULTRASOUND IMAGING | Facility: CLINIC | Age: 84
Discharge: HOME | End: 2019-10-10
Attending: SURGERY | Admitting: SURGERY
Payer: COMMERCIAL

## 2019-10-10 ENCOUNTER — OFFICE VISIT (OUTPATIENT)
Dept: SURGERY | Facility: CLINIC | Age: 84
End: 2019-10-10
Attending: SURGERY
Payer: COMMERCIAL

## 2019-10-10 VITALS
HEART RATE: 90 BPM | RESPIRATION RATE: 16 BRPM | OXYGEN SATURATION: 97 % | SYSTOLIC BLOOD PRESSURE: 126 MMHG | DIASTOLIC BLOOD PRESSURE: 70 MMHG

## 2019-10-10 DIAGNOSIS — Z98.890 HISTORY OF CAROTID ENDARTERECTOMY: ICD-10-CM

## 2019-10-10 DIAGNOSIS — I65.21 ASYMPTOMATIC STENOSIS OF RIGHT CAROTID ARTERY: Primary | ICD-10-CM

## 2019-10-10 DIAGNOSIS — I65.23 BILATERAL CAROTID ARTERY STENOSIS: ICD-10-CM

## 2019-10-10 PROCEDURE — 99213 OFFICE O/P EST LOW 20 MIN: CPT | Performed by: SURGERY

## 2019-10-10 PROCEDURE — 93880 EXTRACRANIAL BILAT STUDY: CPT

## 2019-10-10 NOTE — PROGRESS NOTES
No history TIA CVA Amaurosis fugax since last check.  Carotids-4/4 no bruits  Motor/sensory function intact CR. N. 2-12 intact.  Ultrasound shows widely patent carotids <50% stenosis bilaterally.  She is taking ASA and a statin.  Doing very well no need for further follow up.

## 2019-11-04 ENCOUNTER — OFFICE VISIT (OUTPATIENT)
Dept: SLEEP MEDICINE | Facility: CLINIC | Age: 84
End: 2019-11-04
Payer: COMMERCIAL

## 2019-11-04 VITALS
HEIGHT: 65 IN | OXYGEN SATURATION: 96 % | BODY MASS INDEX: 21.56 KG/M2 | WEIGHT: 129.4 LBS | DIASTOLIC BLOOD PRESSURE: 81 MMHG | SYSTOLIC BLOOD PRESSURE: 142 MMHG | HEART RATE: 84 BPM

## 2019-11-04 DIAGNOSIS — G47.52 REM SLEEP BEHAVIOR DISORDER: Primary | ICD-10-CM

## 2019-11-04 DIAGNOSIS — G47.33 OSA (OBSTRUCTIVE SLEEP APNEA): ICD-10-CM

## 2019-11-04 PROCEDURE — 99204 OFFICE O/P NEW MOD 45 MIN: CPT | Performed by: INTERNAL MEDICINE

## 2019-11-04 ASSESSMENT — MIFFLIN-ST. JEOR: SCORE: 1042.83

## 2019-11-04 NOTE — PROGRESS NOTES
[facility]   Outpatient Sleep Medicine Consultation  November 4, 2019      Name: Jennifer Whiteside MRN# [id number]   Age: 84 year old YOB: 1935     Date of Consultation: November 4, 2019  Consultation is requested by: Carisa Waggoner MD  [address]  [place] [address] Carisa Waggoner  Primary care provider: Carisa Waggoner  Home clinic: [facility]           Reason for Sleep Consult:     Jennifer Whiteside is a 84 year old female for complaints of fatigue and tiredness throughout the day for 12 months.         Assessment and Plan:     Summary Sleep Diagnoses:      Clinical features of obstructive sleep apnea with daytime sleepiness    Hypersomnolence associated with new onset parasomnias with hallucinations and possible dream enactment        Comorbid Diagnoses:  Atrial fibrillation  Glaucoma  Carotid artery disease    Summary Recommendations:      Split-night CPAP with melatonin 3 mg at bedtime    Video monitoring for parasomnias   Although this patient has a normal [place] sleepiness scale she clearly has had an increase in pathologic sleepiness with nodding off on most days and napping on most days during the week and has developed new hypnagogic hallucinations suggesting insufficient sleep or sleep disruption.  It is difficult to exclude dream enactment given actions to launch out of bed at times when she has visual hallucinations at night.      Summary Counseling:  See instructions    Counseling included a comprehensive review of diagnostic and therapeutic strategies as well as risks of inadequate therapy.  Educational materials provided in instructions.           History of Present Illness:     Jennifer Whiteside is a 84 year old female with loss of energy and fatigue and a tendency to nod off 3-4 days per week and new onset intentional napping 3-4 days per week. Sleeps with mouth open and awakens with dry mouth. She lives  alone with no regular observer.  Although this patient has a normal [place] sleepiness scale she clearly has had an increase in pathologic sleepiness with nodding off on most days and napping on most days during the week and has developed new hypnagogic hallucinations suggesting insufficient sleep or sleep disruption.  It is difficult to exclude dream enactment given actions to launch out of bed at times when she has visual hallucinations at night.      SLEEP-WAKE SCHEDULE:       Bedtime 9:30 PM with a 5 to 10-minute sleep latency awakening 7 AM 3-4 awakenings through the night lasting 15 to 30 minutes to go to the bathroom  Naps 5 to 7/week 1/2-hour to an hour no electronics with the exception of television in bed    In the evening while watching TV with legs elevated has tingling in feet briefly without associated distress      BEDTIME ACTIVITIES AND SHIFT WORK:    Jennifer Whiteside       SCALES       SLEEP APNEA: Stopbang score         INSOMNIA:  Insomnia severity score: 10 (severe greater than or equal to 22)       SLEEPINESS: [place] sleepiness scale: 6 [normal < 11]   Drowsy driving/near accidents none    SLEEP COMPLAINTS:  Cardio-respiratory    Snoring- ?/week  Dyspnea- denies  Morning headaches or confusion-denies  Coexisting Lung disease: denies    Coexisting Heart disease: denies    Does patient have a bed partner: denies  Has bed partner been sleeping separately because of snoring:  denies            RLS Screen: When you try to relax in the evening or sleep at  night, do you ever have unpleasant, restless feelings in your  legs that can be relieved by walking or movement? denies    Periodic limb movement: denies    Narcolepsy:  denies sudden urges of sleep attacks    Cataplexy:  denies     Sleep paralysis:  denies      Hallucinations:   Hypnagogic hallucinations, occasiona     Sleep Behaviors:    Leg symptoms/movements: denies    Motor restlessness:denies    Night terrors: denies    Bruxism:  denies    Automatic behaviors: none    Other subjective complaints:    Anxiety or rumination denies    Pain and discomfort at  night: denies    Waking up with heart pounding or racing: denies    GERD or aspiration:denies         Parasomnia:   NREM - denies recurrent persistent confusional arousal, night eating, sleep walking or sleep terrors   REM  - saw 6 dogs in bedroom and gets out of bed and animals disappeared; no injuries or falls though these occur 6-8 times per year              Medications:     Current Outpatient Medications   Medication Sig     alendronate (FOSAMAX) 70 MG tablet 1 tablet weekly with 8 oz water.     aspirin EC 81 MG EC tablet Take 1 tablet by mouth daily.     GARLIC PO Take 2 tablets by mouth daily     Multiple Vitamin (MULTI-VITAMIN) per tablet Take  by mouth. Equaline MVI - 1 daily     Omega-3 Fatty Acids (FISH OIL) 1200 MG capsule Take 1 capsule by mouth 2 times daily.     pravastatin (PRAVACHOL) 40 MG tablet Take 1 tablet (40 mg) by mouth daily     sertraline (ZOLOFT) 50 MG tablet Take 1 tablet (50 mg) by mouth daily     No current facility-administered medications for this visit.         No Known Allergies         Past Medical History:     Does not need 02 supplement at night   Past Medical History:   Diagnosis Date     Atrial fibrillation (H) 5/2012    with RVR.  Occurred POD #2 following carotid enarterectomy.     Depressive disorder      Elevated LFTs     occurred on lipitor; resolved off medications     Esophageal stenosis      Hyperlipidaemia      Hyperlipidemia      Irregular heart beat      New onset atrial fibrillation (H) 5/13/2012     Osteoarthrosis      PAD (peripheral artery disease) (H)      PVD (peripheral vascular disease) (H)     s/p carotid enarterectomy 5-10-12             Past Surgical History:    Previous upper airway surgery    Past Surgical History:   Procedure Laterality Date     APPENDECTOMY      appy as a child     C HILLIARD W/O FACETEC FORMIGUELOT/DONNA 1/2 VRT SEG,  LUMBAR      L4     CLOSED REDUCTION WRIST Right 2016    Procedure: CLOSED REDUCTION WRIST;  Surgeon: Mina Brand MD;  Location: [facility] OR     COLONOSCOPY  2013    Procedure: COMBINED COLONOSCOPY, SINGLE BIOPSY/POLYPECTOMY BY BIOPSY;  COLONOSCOPY with polypectomy using cold forceps.;  Surgeon: Madeline Oviedo MD;  Location:  GI     COLONOSCOPY  2016    Dr. Oviedo [facility]     COLONOSCOPY N/A 2016    Procedure: COMBINED COLONOSCOPY, SINGLE OR MULTIPLE BIOPSY/POLYPECTOMY BY BIOPSY;  Surgeon: Madeline Oviedo MD;  Location: [facility] GI     ENDARTERECTOMY CAROTID  5/10/2012    LEFT, WITH EEG ; Surgeon:SELINA HANKS; Location: OR     ENT SURGERY      T&A as a child     HC CORRECT BUNION,SIMPLE      Right     HC REPAIR ROTATOR CUFF,ACUTE      Right     HC REPAIR ROTATOR CUFF,ACUTE      Left     HYSTERECTOMY, VAGINAL      simple      rt foot hammer toe repair              Social History:     Social History     Tobacco Use     Smoking status: Former Smoker     Packs/day: 0.50     Years: 3.00     Pack years: 1.50     Types: Cigarettes     Smokeless tobacco: Never Used     Tobacco comment: quit    Substance Use Topics     Alcohol use: Yes     Comment: occ wine                Family History:     Family History   Problem Relation Age of Onset     Diabetes Brother      Diabetes Brother      C.A.D. Father          52 yo Cerebral hemorrhage     Hypertension Mother          84 yo     Arthritis Sister      Colon Cancer Sister      Family History Negative Son         Rheumatic fever     Family History Negative Son      Family History Negative Son      Family History Negative Daughter      Hypertension Daughter      Gynecology Daughter         D & C with issues uncertain             Review of Systems:     A complete 10 point review of systems was negative other than HPI or as commented below:     Shortness of breath with activity and dry cough  Mild general anxiety - takes  sertraline  New skin changes         Physical Examination:   No flowsheet data found.    Constitutional: . Awake, alert, cooperative, dressed casually, good eye contact, comfortably sitting in a chair, in no apparent distress  Mood: euthymic; affect congruent with full range and intensity.  Attention/Concentration:  Normal   Eyes: No icterus.  ENT: Mallampati Class: II, mild retrognathia  Cardiovascular: Regular S1 and S2, no gallops or murmurs. No carotid bruits  Neck: Supple, no thyroid enlargement.   Pulmonary:  Chest symmetric, lungs clear bilaterally and no crackles, wheezes or rales  Extremities:  No pedal edema.  Muscle/joint: Strength and tone normal   Skin:  No rash or significant lesions.   Gait Normal.  Neurologic: Alert, oriented x3, no focal neurological deficit, cranial nerves grossly normal            Data: All pertinent previous laboratory data reviewed     No results found for: PH, PHARTERIAL, PO2, [id number], SAT, PCO2, HCO3, BASEEXCESS, LAINA, BEB  Lab Results   Component Value Date    TSH 3.99 09/03/2019    TSH 2.91 05/12/2012     Lab Results   Component Value Date    GLC 95 01/14/2019     (H) 02/15/2018     Lab Results   Component Value Date    HGB 12.3 09/03/2019    HGB 12.1 02/04/2013     Lab Results   Component Value Date    BUN 11 01/14/2019    BUN 24 02/15/2018    CR 0.58 01/14/2019    CR 0.81 02/15/2018     Lab Results   Component Value Date    AST 12 07/11/2018    AST 12 05/04/2018    ALT 18 07/11/2018    ALT 19 05/04/2018    ALKPHOS 81 07/11/2018    ALKPHOS 79 05/04/2018    BILITOTAL 0.8 07/11/2018    BILITOTAL 0.9 05/04/2018    BILICONJ 0.0 03/22/2007    BILICONJ 0.0 02/11/2006     No results found for: UAMP, UBARB, BENZODIAZEUR, UCANN, UCOC, OPIT, UPCP    Echocardiology:    Interpretation Summary  The study was technically adequate.  There is no comparison study available.  Relatively low fluid volume may explain the small collapsing IVC and right   atrial pressure low enough to  cause the intra atrial septum to bow rightward.   The trivial pericardial effusion may be a normal variant.  There is borderline concentric left ventricular hypertrophy. The visual   ejection fraction is estimated at 60-65%. The left atrium is mildly dilated.   Trivial pericardial effusion  PatientHeight: 65 in  PatientWeight: 130 lbs  SystolicPressure: 134 mmHg  DiastolicPressure: 75 mmHg  HeartRate: 86 bpm  BSA 1.6 m^2      Copy to: Carisa Waggoner MD 11/4/2019     Total time spent with patient: 45 min >50% counseling

## 2019-11-04 NOTE — NURSING NOTE
"BP (!) 142/81   Pulse 84   Ht 1.651 m (5' 5\")   Wt 58.7 kg (129 lb 6.4 oz)   SpO2 96%   BMI 21.53 kg/m      Neck 38cm/15inches    DME-none    ESS- 6  RADHA-10    Med Rec-complete    Etta Nelson, Medical Assistant 11/4/2019 1:03 PM  \  "

## 2019-11-04 NOTE — PATIENT INSTRUCTIONS
MY TREATMENT INFORMATION FOR SLEEP APNEA-  Jennifer Whiteside    DOCTOR : JOSE HODGES MD  SLEEP CENTER :      MY CONTACT NUMBER:     Am I having a sleep study at a sleep center?  Make sure you have an appointment for the study before you leave!    Am I having a home sleep study?  Watch this video:  https://www.Swapsee.com/watch?v=CteI_[email or web address]&[email or web address]  Please verify your insurance coverage with your insurance carrier    Visiting a Sleep Clinic- take 3 mg of melatonin in sleep room    Are you worried about having a sleep study? Talk with your healthcare provider if you have any concerns. Learn what to expect at the sleep clinic and try to relax before you come.  Before your study  Your healthcare provider will tell you how to prepare. Ask if you should take your usual medicines. Also:    Don't nap.    Don't take caffeine and alcohol.    Take a shower and wash your hair (don t use hair conditioner, hair spray, and skin lotions).    Eat dinner before you come to the sleep clinic. Pack a snack if you need one before bedtime.    Bring what will make you comfortable, such as your pajamas, robe, slippers, hygiene items, and even your own pillow.  What you can expect  When you arrive at the sleep clinic, you will usually be checked in by a . A specialized sleep technologist will meet you in your room. Then you may change into your pajamas. Small sensors are placed on your head and body with tape and gel. The sensors are then plugged into a machine that will monitor your sleep. If you need to use a restroom, the sensors can be unplugged. A camera in your room will record your body movements. The technologist will stay in a nearby room. If you need to talk to him or her, use the intercom.  What a sleep study does  A sleep study monitors all the stages of your sleep. To do this, the following are recorded:    Eye movements    Heart rate, brain waves, and muscle  "activity    Level of oxygen in your blood    Breathing and snoring    Sudden leg or body movements  If you have breathing problems, Continuous Positive Airway Pressure (CPAP) may be used. CPAP is a device that improves your sleep by helping you breathe. It adds mild air pressure to keep your airway passages open during sleep. CPAP may be needed in someone with sleep apnea. It may be used during the second half of your study or on another night.  Getting your results  The technologist can answer some of your questions about the sleep study. But only your healthcare provider can explain the results. He or she will have the report of your sleep study within 1 to 2 weeks. Then your treatment options can be discussed with your healthcare provider, or you may be referred to a sleep disorders specialist.        Sleep Apnea Frequently asked questions:  1. What is Obstructive Sleep Apnea (CRESENCIO)? CRESENCIO is the most common type of sleep apnea. Apnea means, \"without breath.\"  Apnea is most often caused by narrowing or collapse of the upper airway as muscles relax during sleep.   Almost everyone has occasional apneas. Most people with sleep apnea have had brief interruptions at night frequently for many years.  The severity of sleep apnea is related to how frequent and severe the events are.   2. What are the consequences of CRESENCIO? Symptoms include: feeling sleepy during the day, snoring loudly, gasping or stopping of breathing, trouble sleeping, and occasionally morning headaches or heartburn at night.  Sleepiness can be serious and even increase the risk of falling asleep while driving. Other health consequences may include development of high blood pressure and other cardiovascular disease in persons who are susceptible. Untreated CRESENCIO  can contribute to heart disease, stroke and diabetes.   3. What are the treatment options? In most situations, sleep apnea is a lifelong disease that must be managed with daily therapy. Medications " are not effective for sleep apnea and surgery is generally not considered until other therapies have been tried. Your treatment is your choice . Continuous Positive Airway (CPAP) works right away and is the therapy that is effective in nearly everyone. An oral device to hold your jaw forward is usually the next most reliable option. Other options include postioning devices (to keep you off your back), weight loss, and surgery including a tongue pacing device. There is more detail about some of these options below.    Important tips for using CPAP and similar devices   Know your equipment:  CPAP is continuous positive airway pressure that prevents obstructive sleep apnea by keeping the throat from collapsing while you are sleeping. In most cases, the device is  smart  and can slowly self-adjusts if your throat collapses and keeps a record every day of how well you are treated-this information is available to you and your care team.  BPAP is bilevel positive airway pressure that keeps your throat open and also assists each breath with a pressure boost to maintain adequate breathing.  Special kinds of BPAP are used in patients who have inadequate breathing from lung or heart disease. In most cases, the device is  smart  and can slowly self-adjusts to assist breathing. Like CPAP, the device keeps a record of how well you are treated.  Your mask is your connection to the device. You get to choose what feels most comfortable and the staff will help to make sure if fits. Here: are some examples of the different masks that are available:       Key points to remember on your journey with sleep apnea:  1. Sleep study.  PAP devices often need to be adjusted during a sleep study to show that they are effective and adjusted right.  2. Good tips to remember: Try wearing just the mask during a quiet time during the day so your body adapts to wearing it. A humidifier is recommended for comfort in most cases to prevent drying of  your nose and throat. Allergy medication from your provider may help you if you are having nasal congestion.  3. Getting settled-in. It takes more than one night for most of us to get used to wearing a mask. Try wearing just the mask during a quiet time during the day so your body adapts to wearing it. A humidifier is recommended for comfort in most cases. Our team will work with you carefully on the first day and will be in contact within 4 days and again at 2 and 4 weeks for advice and remote device adjustments. Your therapy is evaluated by the device each day.   4. Use it every night. The more you are able to sleep naturally for 7-8 hours, the more likely you will have good sleep and to prevent health risks or symptoms from sleep apnea. Even if you use it 4 hours it helps. Occasionally all of us are unable to use a medical therapy, in sleep apnea, it is not dangerous to miss one night.   5. Communicate. Call our skilled team on the number provided on the first day if your visit for problems that make it difficult to wear the device. Over 2 out of 3 patients can learn to wear the device long-term with help from our team. Remember to call our team or your sleep providers if you are unable to wear the device as we may have other solutions for those who cannot adapt to mask CPAP therapy. It is recommended that you sleep your sleep provider within the first 3 months and yearly after that if you are not having problems.   6. Use it for your health. We encourage use of CPAP masks during daytime quiet periods to allow your face and brain to adapt to the sensation of CPAP so that it will be a more natural sensation to awaken to at night or during naps. This can be very useful during the first few weeks or months of adapting to CPAP though it does not help medically to wear CPAP during wakefulness and  should not be used as a strategy just to meet guidelines.  7. Take care of your equipment. Make sure you clean your mask  and tubing using directions every day and that your filter and mask are replaced as recommended or if they are not working.     BESIDES CPAP, WHAT OTHER THERAPIES ARE THERE?    Positioning Device  Positioning devices are generally used when sleep apnea is mild and only occurs on your back.This example shows a pillow that straps around the waist. It may be appropriate for those whose sleep study shows milder sleep apnea that occurs primarily when lying flat on one's back. Preliminary studies have shown benefit but effectiveness at home may need to be verified by a home sleep test. These devices are generally not covered by medical insurance.  Examples of devices that maintain sleeping on the back to prevent snoring and mild sleep apnea.    Belt type body positioner  Http://[employer].HealthUnlocked/    Electronic reminder  Http://nightshifttherapy.com/  Http://www.[employer].HealthUnlocked.au/      Oral Appliance  What is oral appliance therapy?  An oral appliance device fits on your teeth at night like a retainer used after having braces. The device is made by a specialized dentist and requires several visits over 1-2 months before a manufactured device is made to fit your teeth and is adjusted to prevent your sleep apnea. Once an oral device is working properly, snoring should be improved. A home sleep test may be recommended at that time if to determine whether the sleep apnea is adequately treated.       Some things to remember:  -Oral devices are often, but not always, covered by your medical insurance. Be sure to check with your insurance provider.   -If you are referred for oral therapy, you will be given a list of specialized dentists to consider or you may choose to visit the Web site of the American Academy of Dental Sleep Medicine  -Oral devices are less likely to work if you have severe sleep apnea or are extremely overweight.     More detailed information  An oral appliance is a small acrylic device that fits over the upper and  lower teeth  (similar to a retainer or a mouth guard). This device slightly moves jaw forward, which moves the base of the tongue forward, opens the airway, improves breathing for effective treat snoring and obstructive sleep apnea in perhaps 7 out of 10 people .  The best working devices are custom-made by a dental device  after a mold is made of the teeth 1, 2, 3.  When is an oral appliance indicated?  Oral appliance therapy is recommended as a first-line treatment for patients with primary snoring, mild sleep apnea, and for patients with moderate sleep apnea who prefer appliance therapy to use of CPAP4, 5. Severity of sleep apnea is determined by sleep testing and is based on the number of respiratory events per hour of sleep.   How successful is oral appliance therapy?  The success rate of oral appliance therapy in patients with mild sleep apnea is 75-80% while in patients with moderate sleep apnea it is 50-70%. The chance of success in patients with severe sleep apnea is 40-50%. The research also shows that oral appliances have a beneficial effect on the cardiovascular health of CRESENCIO patients at the same magnitude as CPAP therapy7.  Oral appliances should be a second-line treatment in cases of severe sleep apnea, but if not completely successful then a combination therapy utilizing CPAP plus oral appliance therapy may be effective. Oral appliances tend to be effective in a broad range of patients although studies show that the patients who have the highest success are females, younger patients, those with milder disease, and less severe obesity. 3, 6.   Finding a dentist that practices dental sleep medicine  Specific training is available through the American Academy of Dental Sleep Medicine for dentists interested in working in the field of sleep. To find a dentist who is educated in the field of sleep and the use of oral appliances, near you, visit the Web site of the American Academy of Dental  Sleep Medicine.    References  1. Nathan et al. Objectively measured vs self-reported compliance during oral appliance therapy for sleep-disordered breathing. Chest 2013; 144(5): 7710-2281.  2. Loy et al. Objective measurement of compliance during oral appliance therapy for sleep-disordered breathing. Thorax 2013; 68(1): 91-96.  3. Jaylyn et al. Mandibular advancement devices in 620 men and women with CRESENCIO and snoring: tolerability and predictors of treatment success. Chest 2004; 125: 9104-8532.  4. Royal et al. Oral appliances for snoring and CRESENCIO: a review. Sleep 2006; 29: 244-262.  5. Keisha et al. Oral appliance treatment for CRESENCIO: an update. J Clin Sleep Med 2014; 10(2): 215-227.  6. J Carlos et al. Predictors of OSAH treatment outcome. J Dent Res 2007; 86: 0368-0375.      Weight Loss:    Weight loss is a long-term strategy that may improve sleep apnea in some patients.    Weight management is a personal decision and the decision should be based on your interest and the potential benefits.  If you are interested in exploring weight loss strategies, the following discussion covers the impact on weight loss on sleep apnea and the approaches that may be successful.    Being overweight does not necessarily mean you will have health consequences.  Those who have BMI over 35 or over 27 with existing medical conditions carries greater risk.   Weight loss decreases severity of sleep apnea in most people with obesity. For those with mild obesity who have developed snoring with weight gain, even 15-30 pound weight loss can improve and occasionally eliminate sleep apnea.  Structured and life-long dietary and health habits are necessary to lose weight and keep healthier weight levels.     Though there may be significant health benefits from weight loss, long-term weight loss is very difficult to achieve- studies show success with dietary management in less than 10% of people. In addition, substantial  weight loss may require years of dietary control and may be difficult if patients have severe obesity. In these cases, surgical management may be considered.  Finally, older individuals who have tolerated obesity without health complications may be less likely to benefit from weight loss strategies.        Your BMI is Body mass index is 21.53 kg/m .  Weight management is a personal decision.  If you are interested in exploring weight loss strategies, the following discussion covers the approaches that may be successful. Body mass index (BMI) is one way to tell whether you are at a healthy weight, overweight, or obese. It measures your weight in relation to your height.  A BMI of 18.5 to 24.9 is in the healthy range. A person with a BMI of 25 to 29.9 is considered overweight, and someone with a BMI of 30 or greater is considered obese. More than two-thirds of American adults are considered overweight or obese.  Being overweight or obese increases the risk for further weight gain. Excess weight may lead to heart disease and diabetes.  Creating and following plans for healthy eating and physical activity may help you improve your health.  Weight control is part of healthy lifestyle and includes exercise, emotional health, and healthy eating habits. Careful eating habits lifelong are the mainstay of weight control. Though there are significant health benefits from weight loss, long-term weight loss with diet alone may be very difficult to achieve- studies show long-term success with dietary management in less than 10% of people. Attaining a healthy weight may be especially difficult to achieve in those with severe obesity. In some cases, medications, devices and surgical management might be considered.  What can you do?  If you are overweight or obese and are interested in methods for weight loss, you should discuss this with your provider.     Consider reducing daily calorie intake by 500 calories.     Keep a food  journal.     Avoiding skipping meals, consider cutting portions instead.    Diet combined with exercise helps maintain muscle while optimizing fat loss. Strength training is particularly important for building and maintaining muscle mass. Exercise helps reduce stress, increase energy, and improves fitness. Increasing exercise without diet control, however, may not burn enough calories to loose weight.       Start walking three days a week 10-20 minutes at a time    Work towards walking thirty minutes five days a week     Eventually, increase the speed of your walking for 1-2 minutes at time    In addition, we recommend that you review healthy lifestyles and methods for weight loss available through the National Institutes of Health patient information sites:  http://win.niddk.nih.gov/publications/index.htm    And look into health and wellness programs that may be available through your health insurance provider, employer, local community center, or [employer]ik[employer].    Weight management plan Patient was referred to their PCP to discuss a diet and exercise plan.      Surgery:    Surgery for obstructive sleep apnea is considered generally only when other therapies fail to work. Surgery may be discussed with you if you are having a difficult time tolerating CPAP and or when there is an abnormal structure that requires surgical correction.  Nose and throat surgeries often enlarge the airway to prevent collapse.  Most of these surgeries create pain for 1-2 weeks and up to half of the most common surgeries are not effective throughout life.  You should carefully discuss the benefits and drawbacks to surgery with your sleep provider and surgeon to determine if it is the best solution for you.   More information  Surgery for CRESENCIO is directed at areas that are responsible for narrowing or complete obstruction of the airway during sleep.  There are a wide range of procedures available to enlarge and/or stabilize the airway to  prevent blockage of breathing in the three major areas where it can occur: the palate, tongue, and nasal regions.  Successful surgical treatment depends on the accurate identification of the factors responsible for obstructive sleep apnea in each person.  A personalized approach is required because there is no single treatment that works well for everyone.  Because of anatomic variation, consultation with an examination by a sleep surgeon is a critical first step in determining what surgical options are best for each patient.  In some cases, examination during sedation may be recommended in order to guide the selection of procedures.  Patients will be counseled about risks and benefits as well as the typical recovery course after surgery. Surgery is typically not a cure for a person s CRESENCIO.  However, surgery will often significantly improve one s CRESENCIO severity (termed  success rate ).  Even in the absence of a cure, surgery will decrease the cardiovascular risk associated with OSA7; improve overall quality of life8 (sleepiness, functionality, sleep quality, etc).      Palate Procedures:  Patients with CRESENCIO often have narrowing of their airway in the region of their tonsils and uvula.  The goals of palate procedures are to widen the airway in this region as well as to help the tissues resist collapse.  Modern palate procedure techniques focus on tissue conservation and soft tissue rearrangement, rather than tissue removal.  Often the uvula is preserved in this procedure. Residual sleep apnea is common in patient after pharyngoplasty with an average reduction in sleep apnea events of 33%2.      Tongue Procedures:  ExamWhile patients are awake, the muscles that surround the throat are active and keep this region open for breathing. These muscles relax during sleep, allowing the tongue and other structures to collapse and block breathing.  There are several different tongue procedures available.  Selection of a tongue base  procedure depends on characteristics seen on physical exam.  Generally, procedures are aimed at removing bulky tissues in this area or preventing the back of the tongue from falling back during sleep.  Success rates for tongue surgery range from 50-62%3.    Hypoglossal Nerve Stimulation:  Hypoglossal nerve stimulation has recently received approval from the [place] Food and Drug Administration for the treatment of obstructive sleep apnea.  This is based on research showing that the system was safe and effective in treating sleep apnea6.  Results showed that the median AHI score decreased 68%, from 29.3 to 9.0. This therapy uses an implant system that senses breathing patterns and delivers mild stimulation to airway muscles, which keeps the airway open during sleep.  The system consists of three fully implanted components: a small generator (similar in size to a pacemaker), a breathing sensor, and a stimulation lead.  Using a small handheld remote, a patient turns the therapy on before bed and off upon awakening.    Candidates for this device must be greater than 22 years of age, have moderate to severe CRESENCIO (AHI between 20-65), BMI less than 32, have tried CPAP/oral appliance without success, and have appropriate upper airway anatomy (determined by a sleep endoscopy performed by Dr. Hankins).    Hypoglossal Nerve Stimulation Pathway:    The sleep surgeon s office will work with the patient through the insurance prior-authorization process (including communications and appeals).    Nasal Procedures:  Nasal obstruction can interfere with nasal breathing during the day and night.  Studies have shown that relief of nasal obstruction can improve the ability of some patients to tolerate positive airway pressure therapy for obstructive sleep apnea1.  Treatment options include medications such as nasal saline, topical corticosteroid and antihistamine sprays, and oral medications such as antihistamines or decongestants.  Non-surgical treatments can include external nasal dilators for selected patients. If these are not successful by themselves, surgery can improve the nasal airway either alone or in combination with these other options.      Combination Procedures:  Combination of surgical procedures and other treatments may be recommended, particularly if patients have more than one area of narrowing or persistent positional disease.  The success rate of combination surgery ranges from 66-80%2,3.    References  1. Alvin ZHAO. The Role of the Nose in Snoring and Obstructive Sleep Apnoea: An Update.  Eur Arch Otorhinolaryngol. 2011; 268: 1365-73.  2.  Gretchen SM; Sara JA; Gurdeep JR; Pallanch JF; Martin MB; Katty SG; Mirian MATA. Surgical modifications of the upper airway for obstructive sleep apnea in adults: a systematic review and meta-analysis. SLEEP 2010;33(10):2858-1123. Bryan CHILDS. Hypopharyngeal surgery in obstructive sleep apnea: an evidence-based medicine review.  Arch Otolaryngol Head Neck Surg. 2006 Feb;132(2):206-13.  3. Dalton YH1, Lizette Y, Arnie LOURDES. The efficacy of anatomically based multilevel surgery for obstructive sleep apnea. Otolaryngol Head Neck Surg. 2003 Oct;129(4):327-35.  4. Bryna CHILDS, Goldberg A. Hypopharyngeal Surgery in Obstructive Sleep Apnea: An Evidence-Based Medicine Review. Arch Otolaryngol Head Neck Surg. 2006 Feb;132(2):206-13.  5. Demi PJ et al. Upper-Airway Stimulation for Obstructive Sleep Apnea.  N Engl J Med. 2014 Jan 9;370(2):139-49.  6. Zach Y et al. Increased Incidence of Cardiovascular Disease in Middle-aged Men with Obstructive Sleep Apnea. Am J Respir Crit Care Med; 2002 166: 159-165  7. Seb RANDOLPH et al. Studying Life Effects and Effectiveness of Palatopharyngoplasty (SLEEP) study: Subjective Outcomes of Isolated Uvulopalatopharyngoplasty. Otolaryngol Head Neck Surg. 2011; 144: 623-631.            We would like to share some general information about sleep to help us work together to  "get better sleep for you.     Why do we sleep?   -clear toxins from the brain   -remove unnecessary neural connections that accumulate during the day   -enhance memory and learning   Without adequate sleep, our brain may become impaired in a manner that is similar to being intoxicated.       How much sleep do we need?   -The average adult needs 7-8 hours of sleep while young adolescents need up to 9 hours in order to function at their best.   -Between 12 and 20 years of age our sleep is often delayed up to an hour compared to older or younger people.   Aim for 7-8 sleep and a regular schedule; it may take a week or more to eliminate the effects of chronic insufficient sleep.       When should I sleep?   The timing of sleep is determined genetically for each of us.  Some of us are \"night owls\" and others are \"larks\".   The timing of sleep and the amount of sleep we need changes with our age.   Try to schedule your sleep time to fit your natural sleep schedule when you are not busy with school, work, or travel.       What if my sleep schedule doesn't fit my work schedule?   -If you have no trouble falling asleep or getting up during the work week, your work schedule is probably well-matched to your natural sleep schedule.   -You may benefit from a sleep  who can help support you to get to the best schedule.       What are some good habits to start with?   -Your bedroom is for sleeping and should be quiet, comfortably cool and dark before you lie down.   -You should not have electronic devices such as phones or computers in your bedroom.   -Your bedtime and awakening time should fit your natural tendency to fall asleep and wake up and should not vary much between weekdays and weekends.   -No caffeine within 6 hours or alcohol within two hours of bedtime  Be careful and regular with your sleep-you will feel better and think better.    Here are the ranges based off your height and current weight.    Body mass index " is 21.53 kg/m .        Underweight = <18.5  Normal weight = 18.5-24.9   Overweight = 25-29.9   Obesity = BMI of 30 or greater       Your BMI is Body mass index is 21.53 kg/m .  Weight management is a personal decision.  If you are interested in exploring weight loss strategies, the following discussion covers the approaches that may be successful. Body mass index (BMI) is one way to tell whether you are at a healthy weight, overweight, or obese. It measures your weight in relation to your height.  A BMI of 18.5 to 24.9 is in the healthy range. A person with a BMI of 25 to 29.9 is considered overweight, and someone with a BMI of 30 or greater is considered obese. More than two-thirds of American adults are considered overweight or obese.  Being overweight or obese increases the risk for further weight gain. Excess weight may lead to heart disease and diabetes.  Creating and following plans for healthy eating and physical activity may help you improve your health.  Weight control is part of healthy lifestyle and includes exercise, emotional health, and healthy eating habits. Careful eating habits lifelong are the mainstay of weight control. Though there are significant health benefits from weight loss, long-term weight loss with diet alone may be very difficult to achieve- studies show long-term success with dietary management in less than 10% of people. Attaining a healthy weight may be especially difficult to achieve in those with severe obesity. In some cases, medications, devices and surgical management might be considered.  What can you do?  If you are overweight or obese and are interested in methods for weight loss, you should discuss this with your provider.     Consider reducing daily calorie intake by 500 calories.     Keep a food journal.     Avoiding skipping meals, consider cutting portions instead.    Diet combined with exercise helps maintain muscle while optimizing fat loss. Strength training is  particularly important for building and maintaining muscle mass. Exercise helps reduce stress, increase energy, and improves fitness. Increasing exercise without diet control, however, may not burn enough calories to loose weight.       Start walking three days a week 10-20 minutes at a time    Work towards walking thirty minutes five days a week     Eventually, increase the speed of your walking for 1-2 minutes at time    In addition, we recommend that you review healthy lifestyles and methods for weight loss available through the National Institutes of Health patient information sites:  http://win.niddk.nih.gov/publications/index.htm    And look into health and wellness programs that may be available through your health insurance provider, employer, local community center, or nik[employer] club.    Your blood pressure was checked while you were in clinic today.  Please read the guidelines below about what these numbers mean and what you should do about them.  Your systolic blood pressure is the top number.  This is the pressure when the heart is pumping.  Your diastolic blood pressure is the bottom number.  This is the pressure in between beats.  If your systolic blood pressure is less than 120 and your diastolic blood pressure is less than 80, then your blood pressure is normal. There is nothing more that you need to do about it  If your systolic blood pressure is 120-139 or your diastolic blood pressure is 80-89, your blood pressure may be higher than it should be.  You should have your blood pressure re-checked within a year by a primary care provider.  If your systolic blood pressure is 140 or greater or your diastolic blood pressure is 90 or greater, you may have high blood pressure.  High blood pressure is treatable, but if left untreated over time it can put you at risk for heart attack, stroke, or kidney failure.  You should have your blood pressure re-checked by a primary care provider within the next four  weeks.

## 2019-11-06 ENCOUNTER — HEALTH MAINTENANCE LETTER (OUTPATIENT)
Age: 84
End: 2019-11-06

## 2019-11-06 ENCOUNTER — TELEPHONE (OUTPATIENT)
Dept: SLEEP MEDICINE | Facility: CLINIC | Age: 84
End: 2019-11-06

## 2019-11-06 NOTE — TELEPHONE ENCOUNTER
Patient called today.    [facility]    Patient needs to make sure of when she can arrive by for her 8:30 PM study and if the [address] [place] [address] is where she would enter.    Patient will be coming with her brother.    Please contact patient.    Thank you.    Central Scheduling  Miladis CONLEY

## 2019-12-05 ENCOUNTER — THERAPY VISIT (OUTPATIENT)
Dept: SLEEP MEDICINE | Facility: CLINIC | Age: 84
End: 2019-12-05
Payer: COMMERCIAL

## 2019-12-05 DIAGNOSIS — G47.52 REM SLEEP BEHAVIOR DISORDER: ICD-10-CM

## 2019-12-05 DIAGNOSIS — G47.33 OSA (OBSTRUCTIVE SLEEP APNEA): ICD-10-CM

## 2019-12-05 PROCEDURE — 95810 POLYSOM 6/> YRS 4/> PARAM: CPT | Performed by: INTERNAL MEDICINE

## 2019-12-09 LAB — SLPCOMP: NORMAL

## 2019-12-20 ENCOUNTER — OFFICE VISIT (OUTPATIENT)
Dept: SLEEP MEDICINE | Facility: CLINIC | Age: 84
End: 2019-12-20
Payer: COMMERCIAL

## 2019-12-20 VITALS
DIASTOLIC BLOOD PRESSURE: 79 MMHG | BODY MASS INDEX: 21.56 KG/M2 | HEART RATE: 95 BPM | WEIGHT: 129.4 LBS | HEIGHT: 65 IN | OXYGEN SATURATION: 97 % | SYSTOLIC BLOOD PRESSURE: 127 MMHG

## 2019-12-20 DIAGNOSIS — G47.33 OSA (OBSTRUCTIVE SLEEP APNEA): Primary | ICD-10-CM

## 2019-12-20 PROCEDURE — 99213 OFFICE O/P EST LOW 20 MIN: CPT | Performed by: INTERNAL MEDICINE

## 2019-12-20 ASSESSMENT — MIFFLIN-ST. JEOR: SCORE: 1042.83

## 2019-12-20 NOTE — PATIENT INSTRUCTIONS
"As we discussed in the clinic today you have mild sleep apnea associated with some sleep disruption but normal oxygen levels.  Given the mild nature of the sleep apnea and the normal oxygen, there is little medical risk related to this condition.  On the basis of this you felt that you did not want to start a therapy for the sleep apnea.      Because mild sleep apnea in your case did cause sleep disruption, it may contribute to insufficient sleep I understand you are trying to extend your sleep by good sleep habits to manage this.  If you continue to have visual hallucinations at night or are having sleep disruption it may be worthwhile to return for reconsideration of alternative approaches.    There is some good sleep tips:    Why do we sleep?   -clear toxins from the brain   -remove unnecessary neural connections that accumulate during the day   -enhance memory and learning   Without adequate sleep, our brain may become impaired in a manner that is similar to being intoxicated.       How much sleep do we need?   -The average adult needs 7-8 hours of sleep while young adolescents need up to 9 hours in order to function at their best.   -Between 12 and 20 years of age our sleep is often delayed up to an hour compared to older or younger people.   Aim for 7-8 sleep and a regular schedule; it may take a week or more to eliminate the effects of chronic insufficient sleep.       When should I sleep?   The timing of sleep is determined genetically for each of us.  Some of us are \"night owls\" and others are \"larks\".   The timing of sleep and the amount of sleep we need changes with our age.   Try to schedule your sleep time to fit your natural sleep schedule when you are not busy with school, work, or travel.       What if my sleep schedule doesn't fit my work schedule?   -If you have no trouble falling asleep or getting up during the work week, your work schedule is probably well-matched to your natural sleep schedule. "   -You may benefit from a sleep  who can help support you to get to the best schedule.       What are some good habits to start with?   -Your bedroom is for sleeping and should be quiet, comfortably cool and dark before you lie down.   -You should not have electronic devices such as phones or computers in your bedroom.   -Your bedtime and awakening time should fit your natural tendency to fall asleep and wake up and should not vary much between weekdays and weekends.   -No caffeine within 6 hours or alcohol within two hours of bedtime  Be careful and regular with your sleep-you will feel better and think better.        here are the ranges based off your height and current weight.    Body mass index is 21.53 kg/m .        Underweight = <18.5  Normal weight = 18.5-24.9   Overweight = 25-29.9   Obesity = BMI of 30 or greater       Your BMI is Body mass index is 21.53 kg/m .  Weight management is a personal decision.  If you are interested in exploring weight loss strategies, the following discussion covers the approaches that may be successful. Body mass index (BMI) is one way to tell whether you are at a healthy weight, overweight, or obese. It measures your weight in relation to your height.  A BMI of 18.5 to 24.9 is in the healthy range. A person with a BMI of 25 to 29.9 is considered overweight, and someone with a BMI of 30 or greater is considered obese. More than two-thirds of American adults are considered overweight or obese.  Being overweight or obese increases the risk for further weight gain. Excess weight may lead to heart disease and diabetes.  Creating and following plans for healthy eating and physical activity may help you improve your health.  Weight control is part of healthy lifestyle and includes exercise, emotional health, and healthy eating habits. Careful eating habits lifelong are the mainstay of weight control. Though there are significant health benefits from weight loss, long-term weight  loss with diet alone may be very difficult to achieve- studies show long-term success with dietary management in less than 10% of people. Attaining a healthy weight may be especially difficult to achieve in those with severe obesity. In some cases, medications, devices and surgical management might be considered.  What can you do?  If you are overweight or obese and are interested in methods for weight loss, you should discuss this with your provider.     Consider reducing daily calorie intake by 500 calories.     Keep a food journal.     Avoiding skipping meals, consider cutting portions instead.    Diet combined with exercise helps maintain muscle while optimizing fat loss. Strength training is particularly important for building and maintaining muscle mass. Exercise helps reduce stress, increase energy, and improves fitness. Increasing exercise without diet control, however, may not burn enough calories to loose weight.       Start walking three days a week 10-20 minutes at a time    Work towards walking thirty minutes five days a week     Eventually, increase the speed of your walking for 1-2 minutes at time    In addition, we recommend that you review healthy lifestyles and methods for weight loss available through the National Institutes of Health patient information sites:  http://win.niddk.nih.gov/publications/index.htm    And look into health and wellness programs that may be available through your health insurance provider, employer, local community center, or nikki club.    Weight management plan: Discussed healthy diet and exercise guidelines   Your blood pressure was checked while you were in clinic today.  Please read the guidelines below about what these numbers mean and what you should do about them.  Your systolic blood pressure is the top number.  This is the pressure when the heart is pumping.  Your diastolic blood pressure is the bottom number.  This is the pressure in between beats.  If your  systolic blood pressure is less than 120 and your diastolic blood pressure is less than 80, then your blood pressure is normal. There is nothing more that you need to do about it  If your systolic blood pressure is 120-139 or your diastolic blood pressure is 80-89, your blood pressure may be higher than it should be.  You should have your blood pressure re-checked within a year by a primary care provider.  If your systolic blood pressure is 140 or greater or your diastolic blood pressure is 90 or greater, you may have high blood pressure.  High blood pressure is treatable, but if left untreated over time it can put you at risk for heart attack, stroke, or kidney failure.  You should have your blood pressure re-checked by a primary care provider within the next four weeks.

## 2019-12-20 NOTE — NURSING NOTE
"/79   Pulse 95   Ht 1.651 m (5' 5\")   Wt 58.7 kg (129 lb 6.4 oz)   SpO2 97%   BMI 21.53 kg/m      Neck 38cm/15inches    DME-none    ESS- 8    Med Rec-complete    Etta Nelson, Medical Assistant 12/20/2019 10:00 AM      "

## 2019-12-20 NOTE — PROGRESS NOTES
[facility] -  Outpatient Sleep Medicine Consultation  December 20, 2019      Name: Jennifer Whiteside MRN# [id number]   Age: 84 year old YOB: 1935     Date of Consultation: December 20, 2019  Consultation is requested by: No referring provider defined for this encounter.  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    Summary Counseling: We reviewed the modest risks related to mild sleep apnea without hypoxemia.  Although this patient has substantial sleep disruption she has no complaints related to these findings.         History of Present Illness:     Jennifer Whiteside is a 84 year old female with atrial fibrillation and cardiovascular disease as well as occasional hypnagogic hallucinations.  We have discussed home mild sleep apnea and associated sleep disruption and is not interested in pursuing CPAP or oral device therapy for treatment of this condition.  She is attempting to improve her sleep continuity by timing her sleep to natural sleep tendency and avoiding water intake before and during sleep periods.      PREVIOUS SLEEP STUDIES:                   Medications:     Current Outpatient Medications   Medication Sig     alendronate (FOSAMAX) 70 MG tablet 1 tablet weekly with 8 oz water.     aspirin EC 81 MG EC tablet Take 1 tablet by mouth daily.     GARLIC PO Take 2 tablets by mouth daily     Multiple Vitamin (MULTI-VITAMIN) per tablet Take  by mouth. Equaline MVI - 1 daily     Omega-3 Fatty Acids (FISH OIL) 1200 MG capsule Take 1 capsule by mouth 2 times daily.     pravastatin (PRAVACHOL) 40 MG tablet Take 1 tablet (40 mg) by mouth daily     sertraline (ZOLOFT)  50 MG tablet Take 1 tablet (50 mg) by mouth daily     No current facility-administered medications for this visit.         No Known Allergies         Past Medical History:     Does not need 02 supplement at night   Past Medical History:   Diagnosis Date     Atrial fibrillation (H) 5/2012    with RVR.  Occurred POD #2 following carotid enarterectomy.     Depressive disorder      Elevated LFTs     occurred on lipitor; resolved off medications     Esophageal stenosis      Hyperlipidaemia      Hyperlipidemia      Irregular heart beat      New onset atrial fibrillation (H) 5/13/2012     Osteoarthrosis      PAD (peripheral artery disease) (H)      PVD (peripheral vascular disease) (H)     s/p carotid enarterectomy 5-10-12             Past Surgical History:    No h/o  upper airway surgery  Past Surgical History:   Procedure Laterality Date     APPENDECTOMY      appy as a child     C HILLIARD W/O FACETEC FORAMOT/[facility] 1/2 VRT SEG, LUMBAR      L4     CLOSED REDUCTION WRIST Right 1/20/2016    Procedure: CLOSED REDUCTION WRIST;  Surgeon: Mina Brand MD;  Location:  OR     COLONOSCOPY  8/6/2013    Procedure: COMBINED COLONOSCOPY, SINGLE BIOPSY/POLYPECTOMY BY BIOPSY;  COLONOSCOPY with polypectomy using cold forceps.;  Surgeon: Madeline Oviedo MD;  Location:  GI     COLONOSCOPY  8/29/2016    Dr. Oviedo [facility]     COLONOSCOPY N/A 8/29/2016    Procedure: COMBINED COLONOSCOPY, SINGLE OR MULTIPLE BIOPSY/POLYPECTOMY BY BIOPSY;  Surgeon: Madeline Oviedo MD;  Location:  GI     ENDARTERECTOMY CAROTID  5/10/2012    LEFT, WITH EEG ; Surgeon:SELINA HANKS; Location: OR     ENT SURGERY      T&A as a child     HC CORRECT BUNION,SIMPLE      Right     HC REPAIR ROTATOR CUFF,ACUTE  2000    Right     HC REPAIR ROTATOR CUFF,ACUTE  2002    Left     HYSTERECTOMY, VAGINAL      simple      rt foot hammer toe repair  2005                      Physical Examination:   There were no vitals taken for this visit.             Copy to: Edilson  Carisa HODGES MD 12/20/2019     [facility]   Floor 1, [address]   [address] [place] [address]   Appointments: [phone]    [facility]  3rd Floor  [address], [place] [address]     Total time spent with patient: 25 min >50% counseling

## 2019-12-23 ENCOUNTER — OFFICE VISIT (OUTPATIENT)
Dept: URGENT CARE | Facility: URGENT CARE | Age: 84
End: 2019-12-23
Payer: COMMERCIAL

## 2019-12-23 ENCOUNTER — ANCILLARY PROCEDURE (OUTPATIENT)
Dept: GENERAL RADIOLOGY | Facility: CLINIC | Age: 84
End: 2019-12-23
Attending: NURSE PRACTITIONER
Payer: COMMERCIAL

## 2019-12-23 DIAGNOSIS — R07.89 PRESSURE IN LEFT SIDE OF CHEST: Primary | ICD-10-CM

## 2019-12-23 LAB
BASOPHILS # BLD AUTO: 0 10E9/L (ref 0–0.2)
BASOPHILS NFR BLD AUTO: 0.7 %
DIFFERENTIAL METHOD BLD: ABNORMAL
EOSINOPHIL # BLD AUTO: 0.2 10E9/L (ref 0–0.7)
EOSINOPHIL NFR BLD AUTO: 2.5 %
ERYTHROCYTE [DISTWIDTH] IN BLOOD BY AUTOMATED COUNT: 12.3 % (ref 10–15)
HCT VFR BLD AUTO: 38.4 % (ref 35–47)
HGB BLD-MCNC: 12.9 G/DL (ref 11.7–15.7)
LYMPHOCYTES # BLD AUTO: 1.5 10E9/L (ref 0.8–5.3)
LYMPHOCYTES NFR BLD AUTO: 25.6 %
MCH RBC QN AUTO: 34.1 PG (ref 26.5–33)
MCHC RBC AUTO-ENTMCNC: 33.6 G/DL (ref 31.5–36.5)
MCV RBC AUTO: 102 FL (ref 78–100)
MONOCYTES # BLD AUTO: 0.5 10E9/L (ref 0–1.3)
MONOCYTES NFR BLD AUTO: 9 %
NEUTROPHILS # BLD AUTO: 3.7 10E9/L (ref 1.6–8.3)
NEUTROPHILS NFR BLD AUTO: 62.2 %
PLATELET # BLD AUTO: 258 10E9/L (ref 150–450)
RBC # BLD AUTO: 3.78 10E12/L (ref 3.8–5.2)
WBC # BLD AUTO: 6 10E9/L (ref 4–11)

## 2019-12-23 PROCEDURE — 80053 COMPREHEN METABOLIC PANEL: CPT | Performed by: NURSE PRACTITIONER

## 2019-12-23 PROCEDURE — 99214 OFFICE O/P EST MOD 30 MIN: CPT | Performed by: NURSE PRACTITIONER

## 2019-12-23 PROCEDURE — 36415 COLL VENOUS BLD VENIPUNCTURE: CPT | Performed by: NURSE PRACTITIONER

## 2019-12-23 PROCEDURE — 85025 COMPLETE CBC W/AUTO DIFF WBC: CPT | Performed by: NURSE PRACTITIONER

## 2019-12-23 PROCEDURE — 71046 X-RAY EXAM CHEST 2 VIEWS: CPT

## 2019-12-23 NOTE — PROGRESS NOTES
SUBJECTIVE:  Jennifer Whiteside is a 84 year old female who presents to the office with the CC of chest pain.  Patient complains of chest pressure/discomfort.  The pain is characterized as 6 out of 10 and localized pressure and squeeze located below ribs/epigastric area with radiation to none. Symptoms began 12 hour(s) ago, sudden onset and still present  Pain is associated with indigestion and stress/anxiety.  Pain is exacerbated by movement and deep inspiration or coughing.  Pain is relieved by none.  Cardiac risk factors: negative for abnormal lipids, DM, HTN, tobacco and negative FH    Worse with cough  thinks she is wheezing with the cough  Denies any flutter or palpitations.      Past Medical History:   Diagnosis Date     Atrial fibrillation (H) 5/2012    with RVR.  Occurred POD #2 following carotid enarterectomy.     Depressive disorder      Elevated LFTs     occurred on lipitor; resolved off medications     Esophageal stenosis      Hyperlipidaemia      Hyperlipidemia      Irregular heart beat      New onset atrial fibrillation (H) 5/13/2012     Osteoarthrosis      PAD (peripheral artery disease) (H)      PVD (peripheral vascular disease) (H)     s/p carotid enarterectomy 5-10-12         Current Outpatient Medications:      alendronate (FOSAMAX) 70 MG tablet, 1 tablet weekly with 8 oz water., Disp: 12 tablet, Rfl: 3     aspirin EC 81 MG EC tablet, Take 1 tablet by mouth daily., Disp: , Rfl:      GARLIC PO, Take 2 tablets by mouth daily, Disp: , Rfl:      Multiple Vitamin (MULTI-VITAMIN) per tablet, Take  by mouth. Equaline MVI - 1 daily, Disp: 100 tablet, Rfl: 3     Omega-3 Fatty Acids (FISH OIL) 1200 MG capsule, Take 1 capsule by mouth 2 times daily., Disp: , Rfl:      pravastatin (PRAVACHOL) 40 MG tablet, Take 1 tablet (40 mg) by mouth daily, Disp: 90 tablet, Rfl: 3     sertraline (ZOLOFT) 50 MG tablet, Take 1 tablet (50 mg) by mouth daily, Disp: 90 tablet, Rfl: 3     UNABLE TO FIND, MEDICATION NAME:  articoke leaves for cholesterol, Disp: , Rfl:     Social History     Tobacco Use     Smoking status: Former Smoker     Packs/day: 0.50     Years: 3.00     Pack years: 1.50     Types: Cigarettes     Smokeless tobacco: Never Used     Tobacco comment: quit 1958   Substance Use Topics     Alcohol use: Yes     Comment: occ wine       ROS:Review of systems negative except as stated above.    EXAM:  BP (!) 150/92 (BP Location: Right arm, Patient Position: Chair, Cuff Size: Adult Regular)   Pulse 102   Temp 97.5  F (36.4  C) (Oral)   Resp 16   Wt 58.5 kg (128 lb 14.4 oz)   SpO2 99%   BMI 21.45 kg/m    GENERAL APPEARANCE: healthy, alert and no distress  EYES: EOMI,  PERRL, conjunctiva clear  HENT: ear canals and TM's normal.  Nose and mouth without ulcers, erythema or lesions  NECK: supple, nontender, no lymphadenopathy  RESP: lungs clear to auscultation - no rales, rhonchi or wheezes  CV: regular rates and rhythm, normal S1 S2, no murmur noted  ABDOMEN:  soft, nontender, no HSM or masses and bowel sounds normal  NEURO: Normal strength and tone, sensory exam grossly normal,  normal speech and mentation  SKIN: no suspicious lesions or rashes     Results for orders placed or performed in visit on 12/23/19   CBC with platelets differential     Status: Abnormal   Result Value Ref Range    WBC 6.0 4.0 - 11.0 10e9/L    RBC Count 3.78 (L) 3.8 - 5.2 10e12/L    Hemoglobin 12.9 11.7 - 15.7 g/dL    Hematocrit 38.4 35.0 - 47.0 %     (H) 78 - 100 fl    MCH 34.1 (H) 26.5 - 33.0 pg    MCHC 33.6 31.5 - 36.5 g/dL    RDW 12.3 10.0 - 15.0 %    Platelet Count 258 150 - 450 10e9/L    Diff Method Automated Method     % Neutrophils 62.2 %    % Lymphocytes 25.6 %    % Monocytes 9.0 %    % Eosinophils 2.5 %    % Basophils 0.7 %    Absolute Neutrophil 3.7 1.6 - 8.3 10e9/L    Absolute Lymphocytes 1.5 0.8 - 5.3 10e9/L    Absolute Monocytes 0.5 0.0 - 1.3 10e9/L    Absolute Eosinophils 0.2 0.0 - 0.7 10e9/L    Absolute Basophils 0.0 0.0 - 0.2  10e9/L   Comprehensive metabolic panel     Status: None   Result Value Ref Range    Sodium 137 133 - 144 mmol/L    Potassium 4.2 3.4 - 5.3 mmol/L    Chloride 104 94 - 109 mmol/L    Carbon Dioxide 26 20 - 32 mmol/L    Anion Gap 7 3 - 14 mmol/L    Glucose 97 70 - 99 mg/dL    Urea Nitrogen 16 7 - 30 mg/dL    Creatinine 0.59 0.52 - 1.04 mg/dL    GFR Estimate 84 >60 mL/min/[1.73_m2]    GFR Estimate If Black >90 >60 mL/min/[1.73_m2]    Calcium 9.2 8.5 - 10.1 mg/dL    Bilirubin Total 0.4 0.2 - 1.3 mg/dL    Albumin 3.9 3.4 - 5.0 g/dL    Protein Total 7.0 6.8 - 8.8 g/dL    Alkaline Phosphatase 87 40 - 150 U/L    ALT 18 0 - 50 U/L    AST 15 0 - 45 U/L     Pt declined EKG    Assessment  / IMPRESSION  Chest pressure    PLAN:  Reassures not likely cardiac but without EKG unable to be certain  Pt continues to decline EKG at this time.    Recommend she recheck BP's in the community.  If persist above 140/90 to follow up with her PCP.    Advised to 911 if pains persist or worsen for emergent eval.    See orders in epic

## 2019-12-24 LAB
ALBUMIN SERPL-MCNC: 3.9 G/DL (ref 3.4–5)
ALP SERPL-CCNC: 87 U/L (ref 40–150)
ALT SERPL W P-5'-P-CCNC: 18 U/L (ref 0–50)
ANION GAP SERPL CALCULATED.3IONS-SCNC: 7 MMOL/L (ref 3–14)
AST SERPL W P-5'-P-CCNC: 15 U/L (ref 0–45)
BILIRUB SERPL-MCNC: 0.4 MG/DL (ref 0.2–1.3)
BUN SERPL-MCNC: 16 MG/DL (ref 7–30)
CALCIUM SERPL-MCNC: 9.2 MG/DL (ref 8.5–10.1)
CHLORIDE SERPL-SCNC: 104 MMOL/L (ref 94–109)
CO2 SERPL-SCNC: 26 MMOL/L (ref 20–32)
CREAT SERPL-MCNC: 0.59 MG/DL (ref 0.52–1.04)
GFR SERPL CREATININE-BSD FRML MDRD: 84 ML/MIN/{1.73_M2}
GLUCOSE SERPL-MCNC: 97 MG/DL (ref 70–99)
POTASSIUM SERPL-SCNC: 4.2 MMOL/L (ref 3.4–5.3)
PROT SERPL-MCNC: 7 G/DL (ref 6.8–8.8)
SODIUM SERPL-SCNC: 137 MMOL/L (ref 133–144)

## 2020-01-02 DIAGNOSIS — E78.5 HYPERLIPIDEMIA LDL GOAL <70: ICD-10-CM

## 2020-01-03 VITALS
HEART RATE: 102 BPM | BODY MASS INDEX: 21.45 KG/M2 | WEIGHT: 128.9 LBS | TEMPERATURE: 97.5 F | SYSTOLIC BLOOD PRESSURE: 150 MMHG | DIASTOLIC BLOOD PRESSURE: 92 MMHG | RESPIRATION RATE: 16 BRPM | OXYGEN SATURATION: 99 %

## 2020-01-03 NOTE — TELEPHONE ENCOUNTER
"Requested Prescriptions   Pending Prescriptions Disp Refills     pravastatin (PRAVACHOL) 40 MG tablet [Pharmacy Med Name: Pravastatin Sodium Oral Tablet 40 MG]  Last Written Prescription Date:  1/14/2019  Last Fill Quantity: 90,  # refills: 3   Last office visit: 9/3/2019 with prescribing provider:     Future Office Visit:   90 tablet 2     Sig: TAKE ONE TABLET BY MOUTH ONE TIME DAILY       Statins Protocol Passed - 1/2/2020  4:28 PM        Passed - LDL on file in past 12 months     Recent Labs   Lab Test 01/14/19  1019   LDL 83             Passed - No abnormal creatine kinase in past 12 months     No lab results found.             Passed - Recent (12 mo) or future (30 days) visit within the authorizing provider's specialty     Patient has had an office visit with the authorizing provider or a provider within the authorizing providers department within the previous 12 mos or has a future within next 30 days. See \"Patient Info\" tab in inbasket, or \"Choose Columns\" in Meds & Orders section of the refill encounter.              Passed - Medication is active on med list        Passed - Patient is age 18 or older        Passed - No active pregnancy on record        Passed - No positive pregnancy test in past 12 months        "

## 2020-01-06 RX ORDER — PRAVASTATIN SODIUM 40 MG
TABLET ORAL
Qty: 90 TABLET | Refills: 0 | Status: SHIPPED | OUTPATIENT
Start: 2020-01-06 | End: 2020-02-20

## 2020-01-06 NOTE — TELEPHONE ENCOUNTER
Medication is being filled for 1 time refill only due to:  Patient needs to be seen because need annual exam in March.

## 2020-01-19 ENCOUNTER — HOSPITAL ENCOUNTER (EMERGENCY)
Facility: CLINIC | Age: 85
Discharge: HOME | End: 2020-01-19
Attending: INTERNAL MEDICINE | Admitting: INTERNAL MEDICINE
Payer: COMMERCIAL

## 2020-01-19 ENCOUNTER — APPOINTMENT (OUTPATIENT)
Dept: GENERAL RADIOLOGY | Facility: CLINIC | Age: 85
End: 2020-01-19
Attending: INTERNAL MEDICINE
Payer: COMMERCIAL

## 2020-01-19 VITALS
BODY MASS INDEX: 20.63 KG/M2 | SYSTOLIC BLOOD PRESSURE: 129 MMHG | WEIGHT: 124 LBS | OXYGEN SATURATION: 93 % | DIASTOLIC BLOOD PRESSURE: 72 MMHG | RESPIRATION RATE: 18 BRPM | HEART RATE: 86 BPM | TEMPERATURE: 97.4 F

## 2020-01-19 DIAGNOSIS — I49.9 CARDIAC ARRHYTHMIA, UNSPECIFIED CARDIAC ARRHYTHMIA TYPE: ICD-10-CM

## 2020-01-19 LAB
ANION GAP SERPL CALCULATED.3IONS-SCNC: 5 MMOL/L (ref 3–14)
BASOPHILS # BLD AUTO: 0.1 10E9/L (ref 0–0.2)
BASOPHILS NFR BLD AUTO: 1 %
BUN SERPL-MCNC: 17 MG/DL (ref 7–30)
CALCIUM SERPL-MCNC: 9.4 MG/DL (ref 8.5–10.1)
CHLORIDE SERPL-SCNC: 101 MMOL/L (ref 94–109)
CO2 SERPL-SCNC: 30 MMOL/L (ref 20–32)
CREAT SERPL-MCNC: 0.71 MG/DL (ref 0.52–1.04)
D DIMER PPP FEU-MCNC: 0.4 UG/ML FEU (ref 0–0.5)
DIFFERENTIAL METHOD BLD: ABNORMAL
EOSINOPHIL # BLD AUTO: 0 10E9/L (ref 0–0.7)
EOSINOPHIL NFR BLD AUTO: 0 %
ERYTHROCYTE [DISTWIDTH] IN BLOOD BY AUTOMATED COUNT: 11.9 % (ref 10–15)
GFR SERPL CREATININE-BSD FRML MDRD: 79 ML/MIN/{1.73_M2}
GLUCOSE SERPL-MCNC: 112 MG/DL (ref 70–99)
HCT VFR BLD AUTO: 40.7 % (ref 35–47)
HGB BLD-MCNC: 13.3 G/DL (ref 11.7–15.7)
IMM GRANULOCYTES # BLD: 0 10E9/L (ref 0–0.4)
IMM GRANULOCYTES NFR BLD: 0.1 %
LYMPHOCYTES # BLD AUTO: 1.6 10E9/L (ref 0.8–5.3)
LYMPHOCYTES NFR BLD AUTO: 24 %
MCH RBC QN AUTO: 32.9 PG (ref 26.5–33)
MCHC RBC AUTO-ENTMCNC: 32.7 G/DL (ref 31.5–36.5)
MCV RBC AUTO: 101 FL (ref 78–100)
MONOCYTES # BLD AUTO: 0.6 10E9/L (ref 0–1.3)
MONOCYTES NFR BLD AUTO: 9.4 %
NEUTROPHILS # BLD AUTO: 4.4 10E9/L (ref 1.6–8.3)
NEUTROPHILS NFR BLD AUTO: 65.5 %
NRBC # BLD AUTO: 0 10*3/UL
NRBC BLD AUTO-RTO: 0 /100
PLATELET # BLD AUTO: 234 10E9/L (ref 150–450)
POTASSIUM SERPL-SCNC: 3.6 MMOL/L (ref 3.4–5.3)
RBC # BLD AUTO: 4.04 10E12/L (ref 3.8–5.2)
SODIUM SERPL-SCNC: 136 MMOL/L (ref 133–144)
TROPONIN I SERPL-MCNC: <0.015 UG/L (ref 0–0.04)
WBC # BLD AUTO: 6.8 10E9/L (ref 4–11)

## 2020-01-19 PROCEDURE — 99285 EMERGENCY DEPT VISIT HI MDM: CPT | Mod: 25

## 2020-01-19 PROCEDURE — 85025 COMPLETE CBC W/AUTO DIFF WBC: CPT | Performed by: INTERNAL MEDICINE

## 2020-01-19 PROCEDURE — 93005 ELECTROCARDIOGRAM TRACING: CPT

## 2020-01-19 PROCEDURE — 84484 ASSAY OF TROPONIN QUANT: CPT | Performed by: INTERNAL MEDICINE

## 2020-01-19 PROCEDURE — 85379 FIBRIN DEGRADATION QUANT: CPT | Performed by: INTERNAL MEDICINE

## 2020-01-19 PROCEDURE — 80048 BASIC METABOLIC PNL TOTAL CA: CPT | Performed by: INTERNAL MEDICINE

## 2020-01-19 PROCEDURE — 71046 X-RAY EXAM CHEST 2 VIEWS: CPT

## 2020-01-19 ASSESSMENT — ENCOUNTER SYMPTOMS
SHORTNESS OF BREATH: 1
ARTHRALGIAS: 0
CHEST TIGHTNESS: 1
DIZZINESS: 1
SPEECH DIFFICULTY: 0
PALPITATIONS: 1

## 2020-01-19 NOTE — ED PROVIDER NOTES
History     Chief Complaint:  Chest Pain; Shortness of Breath; Palpitations; and Dizziness    HPI   Jennifer Whiteside is a 84 year old female with a history of atrial fibrillation, hypertension, hyperlipidemia, irregular heartbeat, and anxiety who presents to the emergency department for evaluation of dizziness and palpitations. The patient reports that today around 1145 she began feeling palpitations and some chest heaviness. She had just finished eating a cookie at the time when she could feel the chest heaviness and an irregular heart beat in her chest. The patient then developed vertigo soon after and states that she felt she was spinning. She also mentions getting black rings around her eyes. The black rings and vertigo resolved themselves prior to her arrival in the emergency department. She carried on with her day, thinking it would pass, but about an hour and a half prior to her visit she developed some shortness of breath. The patient was able to walk with help, but was swaying. The patient states that she normally has an irregular heart beat and has had this happen in the past. Normally, her symptoms resolve with time. Her symptoms today are more intense than these previous episodes that usually occur once every 6 weeks or so. She previously had a history of atrial fibrillation which occurred after she had a surgery done. The patient denies any memory issues, speech difficulty, leg pain or leg swelling.     Allergies:  No Known Drug Allergies    Medications:    Fosamax  Aspirin 81 mg  Pravachol  Zoloft    Past Medical History:    Atrial fibrillation   Depression  Elevated LFTs  Esophageal stenosis  Hyperlipidemia  Hypertension  Irregular heart beat  Osteoarthrosis  PAD  PVD  Anxiety  Carotid artery stenosis    Past Surgical History:    Appendectomy  C Deutsch   Wrist reduction  Colonoscopy x3  Endarterectomy carotid  T & A  Bunion correction  Rotator cuff surgery bilaterally  Hysterectomy  Hammer toe  repair    Family History:    Diabetes  CAD  Hypertension  Arthritis  Colon cancer    Social History:  The patient was accompanied to the ED by her female friend.  Smoking Status: Former  Smokeless Tobacco: Never  Alcohol Use: Yes  Drug Use: No  Marital Status:        Review of Systems   Respiratory: Positive for chest tightness and shortness of breath.    Cardiovascular: Positive for palpitations. Negative for leg swelling.   Musculoskeletal: Negative for arthralgias.   Neurological: Positive for dizziness. Negative for speech difficulty.   All other systems reviewed and are negative.      Physical Exam   Vitals:  Patient Vitals for the past 24 hrs:   BP Temp Temp src Pulse Heart Rate Resp SpO2 Weight   01/19/20 1530 129/72 -- -- 86 88 18 93 % --   01/19/20 1515 129/73 -- -- 88 92 12 92 % --   01/19/20 1500 104/72 -- -- 98 89 20 94 % --   01/19/20 1445 119/77 -- -- 92 102 15 95 % --   01/19/20 1420 131/86 97.4  F (36.3  C) Temporal 113 113 20 99 % 56.2 kg (124 lb)     Physical Exam  Constitutional:       Comments: Pleasant and cooperative   HENT:      Ears:      Comments: Hearing aids in place     Mouth/Throat:      Pharynx: No posterior oropharyngeal erythema.   Eyes:      Conjunctiva/sclera: Conjunctivae normal.   Neck:      Musculoskeletal: Neck supple.   Cardiovascular:      Rate and Rhythm: Normal rate and regular rhythm.      Heart sounds: Normal heart sounds.   Pulmonary:      Effort: Pulmonary effort is normal.      Breath sounds: Normal breath sounds.   Abdominal:      General: Bowel sounds are normal. There is no distension.      Palpations: Abdomen is soft.      Tenderness: There is no abdominal tenderness. There is no guarding or rebound.   Musculoskeletal: Normal range of motion.   Skin:     General: Skin is warm and dry.   Neurological:      Mental Status: She is alert.         Emergency Department Course   ECG:  Indication: Irregular heartbeat  Completed at 1435.  Read at 1455.   Rate 99 bpm.  LA interval 178. QRS duration 74. QT/QTc 356/456. P-R-T axes 63 -16 70.  Normal sinus rhythm  Normal ECG  No significant change when compared to EKG dated 01/18/2016.    Imaging:  Radiographic findings were communicated with the patient who voiced understanding of the findings.    XR Chest PA & LAT  Negative exam.  Reading per radiology.    Laboratory:  CBC: WNL. (WBC 6.8, HGB 13.3, )   BMP: Glucose 112 (H) o/w AWNL (Creatinine 0.71)    Troponin (Collected 1437): <0.015  D Dimer (Collected 1437): 0.4     Emergency Department Course:  Past medical records, nursing notes, and vitals reviewed.    1432 I performed an exam of the patient as documented above.     EKG obtained in the ED, see results above.   IV was inserted and blood was drawn for laboratory testing, results above.  The patient was sent for a chest x-ray while here in the emergency department, findings above.    1551 I rechecked the patient and discussed the results of her workup thus far.     Findings and plan explained to the Patient. Patient discharged home with instructions regarding supportive care, medications, and reasons to return. The importance of close follow-up was reviewed.     I personally reviewed the laboratory results with the Patient and answered all related questions prior to discharge.    Impression & Plan      Medical Decision Making:    Jennifer Whiteside is a 84 year old female who presents to the emergency department after an episode of rapid and irregular heartbeat associated with chest pain and dyspnea at home.  Her history is strongly suggestive of an arrhythmia.  This certainly could have been an episode of atrial fibrillation though by the time of arrival here she was in sinus rhythm.  She is now asymptomatic.  We discussed options of admitting for observation versus outpatient management.  She prefers outpatient which I think is reasonable.  I will have her present for application of ZIO patch monitor tomorrow,  close follow-up with primary care, return if recurrent symptoms or other problems.      Diagnosis:    ICD-10-CM    1. Cardiac arrhythmia, unspecified cardiac arrhythmia type I49.9 Zio Patch Holter Adult Pediatric Greater than 48 hrs       Disposition:  discharged to home    Discharge Medications:  None      Seferino CASTRO, am serving as a scribe on 1/19/2020 at 2:36 PM to personally document services performed by Sandra Espitia MD based on my observations and the provider's statements to me.     Seferino Altamirano  1/19/2020   [facility] EMERGENCY DEPARTMENT       Sandra Espitia MD  01/19/20 1610

## 2020-01-19 NOTE — ED TRIAGE NOTES
Patient reports chest pain that started at 1145 along with dizziness and palpitations. Shortness of breath started at 1300.

## 2020-01-19 NOTE — ED NOTES
Patient discharged to home. Patient received follow-up with holter monitor and PCP when available. Patient received discharge instructions and has no other questions at this time.

## 2020-01-19 NOTE — ED AVS SNAPSHOT
[facility] Emergency Department  [address]  [facility] [address]  Phone:  [phone]  Fax:  [phone]                                    Jennifer Whiteside   MRN: [id number]    Department:  [facility] Emergency Department   Date of Visit:  1/19/2020           After Visit Summary Signature Page    I have received my discharge instructions, and my questions have been answered. I have discussed any challenges I see with this plan with the nurse or doctor.    ..........................................................................................................................................  Patient/Patient Representative Signature      ..........................................................................................................................................  Patient Representative Print Name and Relationship to Patient    ..................................................               ................................................  Date                                   Time    ..........................................................................................................................................  Reviewed by Signature/Title    ...................................................              ..............................................  Date                                               Time          22EPIC Rev 08/18

## 2020-01-20 ENCOUNTER — OFFICE VISIT (OUTPATIENT)
Dept: INTERNAL MEDICINE | Facility: CLINIC | Age: 85
End: 2020-01-20
Payer: COMMERCIAL

## 2020-01-20 VITALS
DIASTOLIC BLOOD PRESSURE: 84 MMHG | OXYGEN SATURATION: 96 % | WEIGHT: 126.8 LBS | HEART RATE: 101 BPM | TEMPERATURE: 98.1 F | HEIGHT: 65 IN | BODY MASS INDEX: 21.13 KG/M2 | SYSTOLIC BLOOD PRESSURE: 143 MMHG | RESPIRATION RATE: 16 BRPM

## 2020-01-20 DIAGNOSIS — R00.0 TACHYCARDIA: Primary | ICD-10-CM

## 2020-01-20 LAB — INTERPRETATION ECG - MUSE: NORMAL

## 2020-01-20 PROCEDURE — 99214 OFFICE O/P EST MOD 30 MIN: CPT | Performed by: INTERNAL MEDICINE

## 2020-01-20 RX ORDER — METOPROLOL TARTRATE 50 MG
TABLET ORAL
Qty: 10 TABLET | Refills: 1 | Status: SHIPPED | OUTPATIENT
Start: 2020-01-20 | End: 2020-04-29

## 2020-01-20 ASSESSMENT — MIFFLIN-ST. JEOR: SCORE: 1031.04

## 2020-01-20 NOTE — PROGRESS NOTES
Subjective     Jennifer Whiteside is a 84 year old female who presents to clinic today for the following health issues:    HPI   ED/UC Followup:    Facility:  [facility] ED  Date of visit: 01/19/2020  Reason for visit: Irregular, fast heartbeat  Current Status: no symptoms now      She reports that the heartbeat was very fast and very irregular.  It was associated with pain yesterday.  She was not really able to count it.  It lasted hours before she finally went into the ER when she started to feel very dizzy.  By the time she had an EKG, it showed normal rhythm, the monitor did not show any abnormal rhythms but it was presume this could be atrial fibrillation.  She has been referred to have a 7-day monitor placed but has not heard from them yet.  She said she felt a little heavy feeling in her chest intermittently today but did not feel palpitations with it.  She has had previous episodes of tachycardia but it did not seem quite as fast or irregular as yesterday.  Yesterday was the first time she had sharp pain or dizziness.      Patient Active Problem List   Diagnosis     Generalized osteoarthrosis, unspecified site     Dyspepsia     Hyperlipidemia LDL goal <70     Anxiety     Advance Care Planning     Bilateral carotid artery stenosis     Paroxysmal atrial fibrillation (H)     Current Outpatient Medications   Medication Sig Dispense Refill     alendronate (FOSAMAX) 70 MG tablet 1 tablet weekly with 8 oz water. 12 tablet 3     aspirin EC 81 MG EC tablet Take 1 tablet by mouth daily.       GARLIC PO Take 2 tablets by mouth daily       Multiple Vitamin (MULTI-VITAMIN) per tablet Take  by mouth. Equaline MVI - 1 daily 100 tablet 3     Omega-3 Fatty Acids (FISH OIL) 1200 MG capsule Take 1 capsule by mouth 2 times daily.       pravastatin (PRAVACHOL) 40 MG tablet TAKE ONE TABLET BY MOUTH ONE TIME DAILY  90 tablet 0     sertraline (ZOLOFT) 50 MG tablet Take 1 tablet (50 mg) by mouth daily 90 tablet  "3     UNABLE TO FIND MEDICATION NAME: articoke leaves for cholesterol        Social History     Tobacco Use     Smoking status: Former Smoker     Packs/day: 0.50     Years: 3.00     Pack years: 1.50     Types: Cigarettes     Smokeless tobacco: Never Used     Tobacco comment: quit 1958   Substance Use Topics     Alcohol use: Yes     Comment: occ wine     Drug use: No          Reviewed and updated as needed this visit by Provider         Review of Systems   Mild chest heaviness, nonexertional, no shortness of breath, lightheadedness or dizziness,      Objective    BP (!) 143/84 (BP Location: Right arm, Patient Position: Sitting, Cuff Size: Adult Regular)   Pulse 101   Temp 98.1  F (36.7  C) (Oral)   Resp 16   Ht 1.651 m (5' 5\")   Wt 57.5 kg (126 lb 12.8 oz)   SpO2 96%   BMI 21.10 kg/m    Body mass index is 21.1 kg/m .  Physical Exam     CV: Regular S1-S2 without murmurs          Assessment & Plan     1. Tachycardia  She describes very fast and very irregular heartbeat that is very suggestive of atrial fibrillation.  She had had an episode of atrial fibrillation in the past associated with surgery.  Discussed that it is very important to get the monitor to know exactly what is going on.  Since she had a pretty severe episode yesterday, I did order some metoprolol and advise she could take it if she starts feeling very fast heartbeat as this may help slow it down.  Advised if she were to feel the dizziness, sharp pain or shortness of breath again she should call 911  - metoprolol tartrate (LOPRESSOR) 50 MG tablet; 1-2 tablets as needed for fast heartbeat  Dispense: 10 tablet; Refill: 1     26 minutes spent with the patient, >50% of time spent counseling about fast heartbeats, causes, management, how to take metoprolol.    Carisa Waggoner MD  [facility]        "

## 2020-01-20 NOTE — NURSING NOTE
"BP (!) 143/84 (BP Location: Right arm, Patient Position: Sitting, Cuff Size: Adult Regular)   Pulse 101   Temp 98.1  F (36.7  C) (Oral)   Resp 16   Ht 1.651 m (5' 5\")   Wt 57.5 kg (126 lb 12.8 oz)   SpO2 96%   BMI 21.10 kg/m      "

## 2020-01-24 ENCOUNTER — HOSPITAL ENCOUNTER (OUTPATIENT)
Dept: CARDIOLOGY | Facility: CLINIC | Age: 85
Discharge: HOME | End: 2020-01-24
Attending: INTERNAL MEDICINE | Admitting: INTERNAL MEDICINE
Payer: COMMERCIAL

## 2020-01-24 DIAGNOSIS — I49.9 CARDIAC ARRHYTHMIA, UNSPECIFIED CARDIAC ARRHYTHMIA TYPE: ICD-10-CM

## 2020-01-24 PROCEDURE — 0296T ZIO PATCH HOLTER ADULT PEDIATRIC GREATER THAN 48 HRS: CPT

## 2020-01-24 PROCEDURE — 0298T ZIO PATCH HOLTER ADULT PEDIATRIC GREATER THAN 48 HRS: CPT | Performed by: INTERNAL MEDICINE

## 2020-02-06 DIAGNOSIS — F41.9 ANXIETY: ICD-10-CM

## 2020-02-06 NOTE — TELEPHONE ENCOUNTER
"Requested Prescriptions   Pending Prescriptions Disp Refills     sertraline (ZOLOFT) 50 MG tablet [Pharmacy Med Name: Sertraline HCl Oral Tablet  Last Written Prescription Date:  1/14/2019  Last Fill Quantity: 90,  # refills: 3   Last office visit: 1/20/2020 with prescribing provider:     Future Office Visit:   50 MG] 90 tablet 2     Sig: TAKE ONE TABLET BY MOUTH ONE TIME DAILY       SSRIs Protocol Passed - 2/6/2020  1:42 PM        Passed - Recent (12 mo) or future (30 days) visit within the authorizing provider's specialty     Patient has had an office visit with the authorizing provider or a provider within the authorizing providers department within the previous 12 mos or has a future within next 30 days. See \"Patient Info\" tab in inbasket, or \"Choose Columns\" in Meds & Orders section of the refill encounter.              Passed - Medication is active on med list        Passed - Patient is age 18 or older        Passed - No active pregnancy on record        Passed - No positive pregnancy test in last 12 months        "

## 2020-02-16 ENCOUNTER — HEALTH MAINTENANCE LETTER (OUTPATIENT)
Age: 85
End: 2020-02-16

## 2020-02-18 ASSESSMENT — ENCOUNTER SYMPTOMS
PALPITATIONS: 0
FEVER: 0
MYALGIAS: 0
CONSTIPATION: 0
HEMATOCHEZIA: 0
NAUSEA: 0
ABDOMINAL PAIN: 0
FREQUENCY: 0
WEAKNESS: 0
JOINT SWELLING: 0
EYE PAIN: 0
DIARRHEA: 0
CHILLS: 0
HEADACHES: 0
SHORTNESS OF BREATH: 0
COUGH: 0
PARESTHESIAS: 0
DIZZINESS: 0
SORE THROAT: 0
DYSURIA: 0
NERVOUS/ANXIOUS: 0
ARTHRALGIAS: 0
BREAST MASS: 0
HEARTBURN: 0
HEMATURIA: 0

## 2020-02-18 ASSESSMENT — ACTIVITIES OF DAILY LIVING (ADL): CURRENT_FUNCTION: NO ASSISTANCE NEEDED

## 2020-02-20 ENCOUNTER — OFFICE VISIT (OUTPATIENT)
Dept: INTERNAL MEDICINE | Facility: CLINIC | Age: 85
End: 2020-02-20
Payer: COMMERCIAL

## 2020-02-20 VITALS
SYSTOLIC BLOOD PRESSURE: 137 MMHG | HEIGHT: 65 IN | OXYGEN SATURATION: 97 % | WEIGHT: 124.2 LBS | DIASTOLIC BLOOD PRESSURE: 79 MMHG | HEART RATE: 89 BPM | TEMPERATURE: 98.2 F | RESPIRATION RATE: 14 BRPM | BODY MASS INDEX: 20.69 KG/M2

## 2020-02-20 DIAGNOSIS — M85.80 OSTEOPENIA, UNSPECIFIED LOCATION: ICD-10-CM

## 2020-02-20 DIAGNOSIS — Z78.0 ASYMPTOMATIC POSTMENOPAUSAL ESTROGEN DEFICIENCY: ICD-10-CM

## 2020-02-20 DIAGNOSIS — E78.5 HYPERLIPIDEMIA LDL GOAL <70: ICD-10-CM

## 2020-02-20 DIAGNOSIS — Z00.00 ENCOUNTER FOR ROUTINE ADULT HEALTH EXAMINATION WITHOUT ABNORMAL FINDINGS: Primary | ICD-10-CM

## 2020-02-20 DIAGNOSIS — I48.0 PAROXYSMAL ATRIAL FIBRILLATION (H): ICD-10-CM

## 2020-02-20 DIAGNOSIS — Z13.1 SCREENING FOR DIABETES MELLITUS: ICD-10-CM

## 2020-02-20 DIAGNOSIS — I77.9 BILATERAL CAROTID ARTERY DISEASE, UNSPECIFIED TYPE (H): ICD-10-CM

## 2020-02-20 PROCEDURE — 99397 PER PM REEVAL EST PAT 65+ YR: CPT | Performed by: INTERNAL MEDICINE

## 2020-02-20 PROCEDURE — 80048 BASIC METABOLIC PNL TOTAL CA: CPT | Performed by: INTERNAL MEDICINE

## 2020-02-20 PROCEDURE — 36415 COLL VENOUS BLD VENIPUNCTURE: CPT | Performed by: INTERNAL MEDICINE

## 2020-02-20 PROCEDURE — 99214 OFFICE O/P EST MOD 30 MIN: CPT | Mod: 25 | Performed by: INTERNAL MEDICINE

## 2020-02-20 PROCEDURE — 80061 LIPID PANEL: CPT | Performed by: INTERNAL MEDICINE

## 2020-02-20 RX ORDER — METOPROLOL TARTRATE 50 MG
TABLET ORAL
Qty: 10 TABLET | Refills: 1 | Status: CANCELLED | OUTPATIENT
Start: 2020-02-20

## 2020-02-20 RX ORDER — ALENDRONATE SODIUM 70 MG/1
TABLET ORAL
Qty: 12 TABLET | Refills: 3 | Status: SHIPPED | OUTPATIENT
Start: 2020-02-20 | End: 2021-01-29

## 2020-02-20 RX ORDER — PRAVASTATIN SODIUM 40 MG
40 TABLET ORAL DAILY
Qty: 90 TABLET | Refills: 3 | Status: CANCELLED | OUTPATIENT
Start: 2020-02-20

## 2020-02-20 RX ORDER — PRAVASTATIN SODIUM 40 MG
40 TABLET ORAL DAILY
Qty: 90 TABLET | Refills: 3 | Status: SHIPPED | OUTPATIENT
Start: 2020-02-20 | End: 2021-04-07

## 2020-02-20 ASSESSMENT — ENCOUNTER SYMPTOMS
FEVER: 0
COUGH: 0
EYE PAIN: 0
ARTHRALGIAS: 0
WEAKNESS: 0
NAUSEA: 0
HEARTBURN: 0
SORE THROAT: 0
NERVOUS/ANXIOUS: 0
DYSURIA: 0
SHORTNESS OF BREATH: 0
JOINT SWELLING: 0
CHILLS: 0
HEMATOCHEZIA: 0
ABDOMINAL PAIN: 0
MYALGIAS: 0
HEMATURIA: 0
DIZZINESS: 0
FREQUENCY: 0
PARESTHESIAS: 0
HEADACHES: 0
DIARRHEA: 0
PALPITATIONS: 0
BREAST MASS: 0
CONSTIPATION: 0

## 2020-02-20 ASSESSMENT — MIFFLIN-ST. JEOR: SCORE: 1014.25

## 2020-02-20 ASSESSMENT — ACTIVITIES OF DAILY LIVING (ADL): CURRENT_FUNCTION: NO ASSISTANCE NEEDED

## 2020-02-20 NOTE — PROGRESS NOTES
"SUBJECTIVE:   Jennifer Whiteside is a 85 year old female who presents for Preventive Visit.  Are you in the first 12 months of your Medicare coverage?  No    Healthy Habits:     In general, how would you rate your overall health?  Good    Frequency of exercise:  2-3 days/week    Do you usually eat at least 4 servings of fruit and vegetables a day, include whole grains    & fiber and avoid regularly eating high fat or \"junk\" foods?  Yes    Taking medications regularly:  Yes    Medication side effects:  None    Ability to successfully perform activities of daily living:  No assistance needed    Home Safety:  No safety concerns identified    Hearing Impairment:  No hearing concerns    In the past 6 months, have you been bothered by leaking of urine?  No    In general, how would you rate your overall mental or emotional health?  Good      PHQ-2 Total Score: 0    Additional concerns today:  No    Do you feel safe in your environment? Yes    Have you ever done Advance Care Planning? (For example, a Health Directive, POLST, or a discussion with a medical provider or your loved ones about your wishes): Yes, advance care planning is on file.  Fall risk  Fallen 2 or more times in the past year?: No  Any fall with injury in the past year?: No  click delete button to remove this line now  Cognitive Screening   1) Repeat 3 items (Leader, Season, Table)    2) Clock draw: NORMAL  3) 3 item recall: Recalls 2 objects   Results: NORMAL clock, 1-2 items recalled: COGNITIVE IMPAIRMENT LESS LIKELY    Mini-CogTM Copyright GRAZYNA Parra. Licensed by the author for use in [facility]; reprinted with permission (justino@.[facility]). All rights reserved.      Do you have sleep apnea, excessive snoring or daytime drowsiness?: no    Problems:  1.  Atrial fibrillation: Overall doing well without significant symptoms including no chest pain, awareness of fast heartbeat or lightheadedness  2.  Carotid artery disease: Followed by vascular " with previous carotid endarterectomy, ultrasounds are good  3.  Hyperlipidemia: Remains on medication  4.  Osteopenia: She has been on alendronate for several years, due for bone density        Reviewed and updated as needed this visit by clinical staff         Reviewed and updated as needed this visit by Provider        Social History     Tobacco Use     Smoking status: Former Smoker     Packs/day: 0.50     Years: 3.00     Pack years: 1.50     Types: Cigarettes     Smokeless tobacco: Never Used     Tobacco comment: quit 1958   Substance Use Topics     Alcohol use: Yes     Comment: occ wine     If you drink alcohol do you typically have >3 drinks per day or >7 drinks per week? Yes      Alcohol Use 2/18/2020   Prescreen: >3 drinks/day or >7 drinks/week? No   Prescreen: >3 drinks/day or >7 drinks/week? -   No flowsheet data found.    Current providers sharing in care for this patient include:   Patient Care Team:  Carisa Waggoner MD as PCP - General (Internal Medicine)  Carisa Waggoner MD as Assigned PCP    The following health maintenance items are reviewed in Epic and correct as of today:  Health Maintenance   Topic Date Due     ZOSTER IMMUNIZATION (2 of 3) 06/23/2008     MEDICARE ANNUAL WELLNESS VISIT  02/13/2018     LEE ASSESSMENT  09/03/2020     FALL RISK ASSESSMENT  09/03/2020     ADVANCE CARE PLANNING  11/17/2020     DTAP/TDAP/TD IMMUNIZATION (3 - Td) 05/05/2021     DEXA  Completed     PHQ-2  Completed     INFLUENZA VACCINE  Completed     PNEUMOCOCCAL IMMUNIZATION 65+ LOW/MEDIUM RISK  Completed     IPV IMMUNIZATION  Aged Out     MENINGITIS IMMUNIZATION  Aged Out       Patient Active Problem List   Diagnosis     Generalized osteoarthrosis, unspecified site     Dyspepsia     Hyperlipidemia LDL goal <70     Anxiety     Advance Care Planning     Paroxysmal atrial fibrillation (H)     Bilateral carotid artery disease, unspecified type (H)     Osteopenia of multiple sites     Current Outpatient Medications   Medication  "Sig Dispense Refill     alendronate (FOSAMAX) 70 MG tablet 1 tablet weekly with 8 oz water. 12 tablet 3     aspirin EC 81 MG EC tablet Take 1 tablet by mouth daily.       GARLIC PO Take 2 tablets by mouth daily       metoprolol tartrate (LOPRESSOR) 50 MG tablet 1-2 tablets as needed for fast heartbeat 10 tablet 1     Multiple Vitamin (MULTI-VITAMIN) per tablet Take  by mouth. Equaline MVI - 1 daily 100 tablet 3     Omega-3 Fatty Acids (FISH OIL) 1200 MG capsule Take 1 capsule by mouth 2 times daily.       pravastatin (PRAVACHOL) 40 MG tablet TAKE ONE TABLET BY MOUTH ONE TIME DAILY  90 tablet 0     sertraline (ZOLOFT) 50 MG tablet TAKE ONE TABLET BY MOUTH ONE TIME DAILY  90 tablet 1     UNABLE TO FIND MEDICATION NAME: articoke leaves for cholesterol          Review of Systems   Constitutional: Negative for chills and fever.   HENT: Negative for congestion, ear pain, hearing loss and sore throat.    Eyes: Negative for pain and visual disturbance.   Respiratory: Negative for cough and shortness of breath.    Cardiovascular: Negative for chest pain, palpitations and peripheral edema.   Gastrointestinal: Negative for abdominal pain, constipation, diarrhea, heartburn, hematochezia and nausea.   Breasts:  Negative for tenderness, breast mass and discharge.   Genitourinary: Negative for dysuria, frequency, genital sores, hematuria, pelvic pain, urgency, vaginal bleeding and vaginal discharge.   Musculoskeletal: Negative for arthralgias, joint swelling and myalgias.   Skin: Negative for rash.   Neurological: Negative for dizziness, weakness, headaches and paresthesias.   Psychiatric/Behavioral: Negative for mood changes. The patient is not nervous/anxious.          OBJECTIVE:      Physical Exam    Patient alert, in no acute distress  /79 (BP Location: Right arm, Patient Position: Sitting, Cuff Size: Adult Regular)   Pulse 89   Temp 98.2  F (36.8  C) (Oral)   Resp 14   Ht 1.651 m (5' 5\")   Wt 56.3 kg (124 lb 3.2 " "oz)   SpO2 97%   BMI 20.67 kg/m      HEENT: extraocular movements are intact, pupils equal and reactive to light and accommodation, TMs clear, oropharynx clear  NECK: Neck supple. No adenopathy. Thyroid symmetric, normal size,, Carotids without bruits.  PULMONARY: clear to auscultation  CARDIAC: regular rate and rhythm and no murmurs, clicks, or gallops  PULSES: 2/2 throughout  BACK: no spinal or CVAT  ABDOMINAL: Soft, nontender.  Normal bowel sounds.  No hepatosplenomegaly or abnormal masses  BREAST: No breast masses or tenderness, No axillary masses or tenderness and No galactorrhea  REFLEXES: 2+ throughout      ASSESSMENT / PLAN:   1. Encounter for routine adult health examination without abnormal findings  Advised about the shingles vaccine, she will check into it with the pharmacy    2. Paroxysmal atrial fibrillation (H)  Stable, continue medications    3. Bilateral carotid artery disease, unspecified type (H)  Stable, continue medication for lipids, follow-up vascular    4. Osteopenia, unspecified location  Continue medication, check bone density  - alendronate 70 MG PO tablet; 1 tablet weekly with 8 oz water.  Dispense: 12 tablet; Refill: 3    5. Hyperlipidemia LDL goal <70  Recheck lab  - Lipid panel reflex to direct LDL Fasting  - pravastatin 40 MG PO tablet; Take 1 tablet (40 mg) by mouth daily  Dispense: 90 tablet; Refill: 3    6. Screening for diabetes mellitus    - Basic metabolic panel  (Ca, Cl, CO2, Creat, Gluc, K, Na, BUN)    7. Asymptomatic postmenopausal estrogen deficiency    - DX Hip/Pelvis/Spine; Future          COUNSELING:  Reviewed preventive health counseling, as reflected in patient instructions    Estimated body mass index is 21.1 kg/m  as calculated from the following:    Height as of 1/20/20: 1.651 m (5' 5\").    Weight as of 1/20/20: 57.5 kg (126 lb 12.8 oz).         reports that she has quit smoking. Her smoking use included cigarettes. She has a 1.50 pack-year smoking history. She has " never used smokeless tobacco.      Appropriate preventive services were discussed with this patient, including applicable screening as appropriate for cardiovascular disease, diabetes, osteopenia/osteoporosis, and glaucoma.  As appropriate for age/gender, discussed screening for colorectal cancer, prostate cancer, breast cancer, and cervical cancer. Checklist reviewing preventive services available has been given to the patient.    Reviewed patients plan of care and provided an AVS. The Basic Care Plan (routine screening as documented in Health Maintenance) for Jennifer meets the Care Plan requirement. This Care Plan has been established and reviewed with the Patient.    Counseling Resources:  ATP IV Guidelines  Pooled Cohorts Equation Calculator  Breast Cancer Risk Calculator  FRAX Risk Assessment  ICSI Preventive Guidelines  Dietary Guidelines for Americans, 2010  USDA's MyPlate  ASA Prophylaxis  Lung CA Screening    Carisa Waggoner MD  [facility]    Identified Health Risks:

## 2020-02-20 NOTE — NURSING NOTE
"/79 (BP Location: Right arm, Patient Position: Sitting, Cuff Size: Adult Regular)   Pulse 89   Temp 98.2  F (36.8  C) (Oral)   Resp 14   Ht 1.651 m (5' 5\")   Wt 56.3 kg (124 lb 3.2 oz)   SpO2 97%   BMI 20.67 kg/m      "

## 2020-02-20 NOTE — PATIENT INSTRUCTIONS
PREVENTIVE HEALTH RECOMMENDATIONS:   Vaccines: Get a flu shot each year. Get a tetanus shot every 10 years.   Exercise for at least 150 minutes a week (an average of 30 minutes a day, 5 days of the week). This will help you control your weight and prevent disease.  Limit alcohol to one drink per day.  No smoking.   Wear sunscreen to prevent skin cancer.   See your dentist twice a year for an exam and cleaning.    Try to get Calcium 1200 mg total per day. It is best to not take it all at once. Try to get Vitamin D at least 2000 units (50 mcg) per day.    BMI or Body Mass Index is a way of indicating weight and health risk for cardiovascular diseases, high blood pressure, diabetes.   Definitions:    Underweight is less than 18.5 and will be associated with health risk.   Normal BMI is 18.5 to 25   Overweight is 25-29   Obesity is 30 or greater   Morbid Obesity is 40 or greater or 35 or greater with diabetes, prediabetes or abnormal blood sugar, high blood pressure or elevated cholesterol  Obesity and Morbid Obesity are associated with higher health risks. Lowering calories, exercising more may lower your BMI and even small decreases can have positive impact on lowering health risks.   Your Body mass index is 20.67 kg/m ..,

## 2020-02-21 LAB
ANION GAP SERPL CALCULATED.3IONS-SCNC: 5 MMOL/L (ref 3–14)
BUN SERPL-MCNC: 13 MG/DL (ref 7–30)
CALCIUM SERPL-MCNC: 9 MG/DL (ref 8.5–10.1)
CHLORIDE SERPL-SCNC: 102 MMOL/L (ref 94–109)
CHOLEST SERPL-MCNC: 155 MG/DL
CO2 SERPL-SCNC: 26 MMOL/L (ref 20–32)
CREAT SERPL-MCNC: 0.54 MG/DL (ref 0.52–1.04)
GFR SERPL CREATININE-BSD FRML MDRD: 86 ML/MIN/{1.73_M2}
GLUCOSE SERPL-MCNC: 91 MG/DL (ref 70–99)
HDLC SERPL-MCNC: 59 MG/DL
LDLC SERPL CALC-MCNC: 85 MG/DL
NONHDLC SERPL-MCNC: 96 MG/DL
POTASSIUM SERPL-SCNC: 4.7 MMOL/L (ref 3.4–5.3)
SODIUM SERPL-SCNC: 133 MMOL/L (ref 133–144)
TRIGL SERPL-MCNC: 57 MG/DL

## 2020-02-24 PROBLEM — M85.89 OSTEOPENIA OF MULTIPLE SITES: Status: ACTIVE | Noted: 2020-02-24

## 2020-04-21 PROBLEM — I47.10 SVT (SUPRAVENTRICULAR TACHYCARDIA) (H): Status: ACTIVE | Noted: 2020-04-21

## 2020-04-29 ENCOUNTER — VIRTUAL VISIT (OUTPATIENT)
Dept: CARDIOLOGY | Facility: CLINIC | Age: 85
End: 2020-04-29
Attending: INTERNAL MEDICINE
Payer: COMMERCIAL

## 2020-04-29 VITALS
DIASTOLIC BLOOD PRESSURE: 88 MMHG | WEIGHT: 123 LBS | HEART RATE: 87 BPM | SYSTOLIC BLOOD PRESSURE: 145 MMHG | BODY MASS INDEX: 20.47 KG/M2

## 2020-04-29 DIAGNOSIS — R00.0 TACHYCARDIA: ICD-10-CM

## 2020-04-29 DIAGNOSIS — I48.0 PAROXYSMAL ATRIAL FIBRILLATION (H): Primary | ICD-10-CM

## 2020-04-29 PROCEDURE — 99204 OFFICE O/P NEW MOD 45 MIN: CPT | Mod: TEL | Performed by: INTERNAL MEDICINE

## 2020-04-29 RX ORDER — METOPROLOL TARTRATE 25 MG/1
25 TABLET, FILM COATED ORAL 2 TIMES DAILY
Qty: 60 TABLET | Refills: 3 | Status: SHIPPED | OUTPATIENT
Start: 2020-04-29 | End: 2020-05-29

## 2020-04-29 RX ORDER — MULTIVIT-MIN/IRON/FOLIC ACID/K 18-600-40
25 CAPSULE ORAL DAILY
COMMUNITY

## 2020-04-29 NOTE — PROGRESS NOTES
"Jennifer Whiteside is a 85 year old female who is being evaluated via a billable telephone visit.      The patient has been notified of following:     \"This telephone visit will be conducted via a call between you and your physician/provider. We have found that certain health care needs can be provided without the need for a physical exam.  This service lets us provide the care you need with a short phone conversation.  If a prescription is necessary we can send it directly to your pharmacy.  If lab work is needed we can place an order for that and you can then stop by our lab to have the test done at a later time.    Telephone visits are billed at different rates depending on your insurance coverage. During this emergency period, for some insurers they may be billed the same as an in-person visit.  Please reach out to your insurance provider with any questions.    If during the course of the call the physician/provider feels a telephone visit is not appropriate, you will not be charged for this service.\"    Patient has given verbal consent for Telephone visit?  Yes    How would you like to obtain your AVS? 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  Ears/Nose/Throat: negative  Respiratory: No shortness of breath, dyspnea on exertion, cough, or hemoptysis  Cardiovascular: irregular heart beat  Gastrointestinal: negative  Genitourinary: negative  Musculoskeletal: arthritis  Neurologic: negative  Psychiatric: negative  Hematologic/Lymphatic/Immunologic: negative  Endocrine: negative    Patient reported vitals:  BP: 145/88  Heart rate:87  Weight:123lb    Jodi Hoffman [facility]    HPI and Plan:   See dictation [id number]    Orders Placed This Encounter   Procedures     CBC with platelets     Follow-Up with Cardiologist     Echocardiogram Complete     Orders Placed This Encounter   Medications     Vitamin D, Cholecalciferol, 25 MCG (1000 UT) TABS     polyethylene glycol-propylene " glycol (SYSTANE ULTRA) 0.4-0.3 % SOLN ophthalmic solution     Sig: Place 1 drop into both eyes every hour as needed for dry eyes     metoprolol tartrate (LOPRESSOR) 25 MG tablet     Sig: Take 1 tablet (25 mg) by mouth 2 times daily     Dispense:  60 tablet     Refill:  3     apixaban ANTICOAGULANT (ELIQUIS) 2.5 MG tablet     Sig: Take 1 tablet (2.5 mg) by mouth 2 times daily     Dispense:  60 tablet     Refill:  3     For atrial fibrillation. Age >80, weight is less than 60     Medications Discontinued During This Encounter   Medication Reason     aspirin EC 81 MG EC tablet      metoprolol tartrate (LOPRESSOR) 50 MG tablet          Encounter Diagnoses   Name Primary?     Tachycardia      Paroxysmal atrial fibrillation (H) Yes       CURRENT MEDICATIONS:  Current Outpatient Medications   Medication Sig Dispense Refill     alendronate 70 MG PO tablet 1 tablet weekly with 8 oz water. 12 tablet 3     apixaban ANTICOAGULANT (ELIQUIS) 2.5 MG tablet Take 1 tablet (2.5 mg) by mouth 2 times daily 60 tablet 3     GARLIC PO Take 2 tablets by mouth daily       metoprolol tartrate (LOPRESSOR) 25 MG tablet Take 1 tablet (25 mg) by mouth 2 times daily 60 tablet 3     Multiple Vitamin (MULTI-VITAMIN) per tablet Take  by mouth. Equaline MVI - 1 daily 100 tablet 3     Omega-3 Fatty Acids (FISH OIL) 1200 MG capsule Take 1 capsule by mouth 2 times daily.       polyethylene glycol-propylene glycol (SYSTANE ULTRA) 0.4-0.3 % SOLN ophthalmic solution Place 1 drop into both eyes every hour as needed for dry eyes       pravastatin 40 MG PO tablet Take 1 tablet (40 mg) by mouth daily 90 tablet 3     sertraline (ZOLOFT) 50 MG tablet TAKE ONE TABLET BY MOUTH ONE TIME DAILY  90 tablet 1     UNABLE TO FIND MEDICATION NAME: Viacor leaves for cholesterol       Vitamin D, Cholecalciferol, 25 MCG (1000 UT) TABS          ALLERGIES   No Known Allergies    PAST MEDICAL HISTORY:  Past Medical History:   Diagnosis Date     Atrial fibrillation (H) 5/2012     with RVR.  Occurred POD #2 following carotid enarterectomy.     Depressive disorder      Elevated LFTs     occurred on lipitor; resolved off medications     Esophageal stenosis      Hyperlipidaemia      Hyperlipidemia      Irregular heart beat      New onset atrial fibrillation (H) 2012     Osteoarthrosis      PAD (peripheral artery disease) (H)      PVD (peripheral vascular disease) (H)     s/p carotid enarterectomy 5-10-12     SVT (supraventricular tachycardia) (H) 2020       PAST SURGICAL HISTORY:  Past Surgical History:   Procedure Laterality Date     APPENDECTOMY      appy as a child     C HILLIARD W/O FACETEC FORAMOT/[facility]  VRT SEG, LUMBAR      L4     CLOSED REDUCTION WRIST Right 2016    Procedure: CLOSED REDUCTION WRIST;  Surgeon: Mina Brand MD;  Location: [facility] OR     COLONOSCOPY  2013    Procedure: COMBINED COLONOSCOPY, SINGLE BIOPSY/POLYPECTOMY BY BIOPSY;  COLONOSCOPY with polypectomy using cold forceps.;  Surgeon: Madeline Oviedo MD;  Location:  GI     COLONOSCOPY  2016    Dr. Oviedo [facility]     COLONOSCOPY N/A 2016    Procedure: COMBINED COLONOSCOPY, SINGLE OR MULTIPLE BIOPSY/POLYPECTOMY BY BIOPSY;  Surgeon: Madeline Oviedo MD;  Location: [facility] GI     ENDARTERECTOMY CAROTID  5/10/2012    LEFT, WITH EEG ; Surgeon:SELINA HANKS; Location: OR     ENT SURGERY      T&A as a child     HC CORRECT BUNION,SIMPLE      Right     HC REPAIR ROTATOR CUFF,ACUTE      Right     HC REPAIR ROTATOR CUFF,ACUTE      Left     HYSTERECTOMY, VAGINAL      simple      rt foot hammer toe repair         FAMILY HISTORY:  Family History   Problem Relation Age of Onset     Diabetes Brother      Diabetes Brother      C.A.D. Father          54 yo Cerebral hemorrhage     Hypertension Mother          86 yo     Arthritis Sister      Colon Cancer Sister      Family History Negative Son         Rheumatic fever     Family History Negative Son      Family History Negative Son       Family History Negative Daughter      Hypertension Daughter      Gynecology Daughter         D & C with issues uncertain       SOCIAL HISTORY:  Social History     Socioeconomic History     Marital status:      Spouse name: None     Number of children: 5     Years of education: None     Highest education level: None   Occupational History     Employer: RETIRED   Social Needs     Financial resource strain: None     Food insecurity     Worry: None     Inability: None     Transportation needs     Medical: None     Non-medical: None   Tobacco Use     Smoking status: Former Smoker     Packs/day: 0.50     Years: 3.00     Pack years: 1.50     Types: Cigarettes     Smokeless tobacco: Never Used     Tobacco comment: quit 1958   Substance and Sexual Activity     Alcohol use: Yes     Comment: occ wine     Drug use: No     Sexual activity: Never   Lifestyle     Physical activity     Days per week: None     Minutes per session: None     Stress: None   Relationships     Social connections     Talks on phone: None     Gets together: None     Attends Anabaptism service: None     Active member of club or organization: None     Attends meetings of clubs or organizations: None     Relationship status: None     Intimate partner violence     Fear of current or ex partner: None     Emotionally abused: None     Physically abused: None     Forced sexual activity: None   Other Topics Concern      Service Not Asked     Blood Transfusions Not Asked     Caffeine Concern No     Occupational Exposure Not Asked     Hobby Hazards Not Asked     Sleep Concern Not Asked     Stress Concern Not Asked     Weight Concern Not Asked     Special Diet Not Asked     Back Care Not Asked     Exercise Yes     Bike Helmet Yes     Comment: motorcycle     Seat Belt Yes     Self-Exams Yes     Parent/sibling w/ CABG, MI or angioplasty before 65F 55M? No   Social History Narrative     2004;     Enjoys traveling, motorcycling    5 children, [place]  in [place] area.       Review of Systems:  Skin:        Eyes:       ENT:       Respiratory:       Cardiovascular:       Gastroenterology:      Genitourinary:       Musculoskeletal:       Neurologic:       Psychiatric:       Heme/Lymph/Imm:       Endocrine:         Physical Exam:  Vitals: BP (!) 145/88   Pulse 87   Wt 55.8 kg (123 lb)   BMI 20.47 kg/m      Recent Lab Results:  LIPID RESULTS:  Lab Results   Component Value Date    CHOL 155 02/20/2020    HDL 59 02/20/2020    LDL 85 02/20/2020    TRIG 57 02/20/2020    CHOLHDLRATIO 2.6 02/09/2015       LIVER ENZYME RESULTS:  Lab Results   Component Value Date    AST 15 12/23/2019    ALT 18 12/23/2019       CBC RESULTS:  Lab Results   Component Value Date    WBC 6.8 01/19/2020    RBC 4.04 01/19/2020    HGB 13.3 01/19/2020    HCT 40.7 01/19/2020     (H) 01/19/2020    MCH 32.9 01/19/2020    MCHC 32.7 01/19/2020    RDW 11.9 01/19/2020     01/19/2020       BMP RESULTS:  Lab Results   Component Value Date     02/20/2020    POTASSIUM 4.7 02/20/2020    CHLORIDE 102 02/20/2020    CO2 26 02/20/2020    ANIONGAP 5 02/20/2020    GLC 91 02/20/2020    BUN 13 02/20/2020    CR 0.54 02/20/2020    GFRESTIMATED 86 02/20/2020    GFRESTBLACK >90 02/20/2020    MABEL 9.0 02/20/2020        A1C RESULTS:  Lab Results   Component Value Date    A1C 5.3 05/10/2012       INR RESULTS:  Lab Results   Component Value Date    INR 1.6 (A) 07/06/2012    INR 2.1 (A) 06/27/2012    INR 0.98 05/12/2012    INR 0.96 05/10/2012           CC  Carisa Waggoner MD  [facility] [address] [facility] [address]  [place] [address]                    Phone call duration: 12 minutes    Lucius Truong MD

## 2020-04-29 NOTE — PROGRESS NOTES
Service Date: 04/29/2020      CARDIOLOGY CLINIC TELEPHONE CONSULTATION NOTE      REASON FOR VISIT:  Atrial fibrillation.      HISTORY OF PRESENTING ILLNESS:  This is a very pleasant 85-year-old female with whom I am doing a telephone visit with in the setting of this coronavirus pandemic.  I have never met this patient before.  The patient, in the past, in 2013, had followed up with Dr. Byrd.  It appears that the patient had a history of postoperative atrial fibrillation which was short-lived back in [id number].  This was in the setting of a carotid procedure.  She denies any history of cardiac disease.  [place].  She is very functional for being 85, she says.  Denies any functional limitations.  No shortness of breath, syncope, presyncope or classic symptoms of angina.  In 01/2020, she had episodes of palpitations associated with severe chest discomfort.  She came to the emergency department for that.  By the time she arrived there, she was noted to be in sinus rhythm.  Clinically, no symptoms sound like atrial fibrillation.  At baseline, she takes aspirin and statin.  She was prescribed metoprolol as needed to be taken for palpitations.  She had a Zio Patch monitor put on in January which is now showing episodes of paroxysmal atrial fibrillation, burden being 2%, longest episode lasting 2 hours.  She had 1 triggered event which correlated with atrial fibrillation.      Otherwise, she denies any significant cardiovascular symptoms.  As mentioned, she is doing well for being 85.  Her last LDL was 85.  Creatinine, potassium and hemoglobin have been normal in the recent past.  Denies any bleeding problems, falls or upcoming surgeries.      PHYSICAL EXAMINATION:  Deferred but blood pressure had been running high.  Today was 145/88.  She says this has been an issue recently.      ASSESSMENT AND PLAN:  Jennifer Whiteside is an 85-year-old female who had atrial fibrillation on her Zio  Patch monitor.  She did have similar episodes of palpitations in January but this was associated with severe chest discomfort, she says.  Otherwise, she does not have any baseline heart failure symptoms, EP symptoms or angina.  At this point, we discussed rate versus rhythm control strategies and risks and benefits of anticoagulation.  Her CHADS-VASc score based on age of 85, hypertension, sex and carotid disease would be 5.  She denies any bleeding problems.  At this point, we have prescribed her metoprolol 25 mg b.i.d. to be taken on schedule rather than as needed.  I have prescribed her 2.5 mg twice a day of apixaban and I have stopped her aspirin concomitantly.  I will get a CBC checked in a month to ensure she is not having a drop in hemoglobin.  We will also get an echocardiogram and stress test to ensure there are no structural issues to explain the atrial fibrillation episode and her chest discomfort.  I will see her with these results in about a month and then space out visits depending how she is doing.      cc:   Carisa Waggoner MD    [facility]    [address]   [place] [address]         ESHA STEPHENS MD             D: 2020   T: 2020   MT: LUCIO      Name:     LUCY PERRY   MRN:      [id number]        Account:      [id number]   :      1935           Service Date: 2020      Document: [id number]

## 2020-04-29 NOTE — LETTER
4/29/2020    Carisa Waggoner MD    [facility]    [address]   [place] [address]     RE: Jennifer Whiteside  [id number]  [facility] [address]       Dear Colleague,    Thank you for the opportunity to participate in the care of your patient, Jennifer Whiteside, at the [facility]. Please see a copy of my visit note below.     REASON FOR VISIT:  Atrial fibrillation.      HISTORY OF PRESENTING ILLNESS:  This is a very pleasant 85-year-old female with whom I am doing a telephone visit with in the setting of this coronavirus pandemic.  I have never met this patient before.  The patient, in the past, in 2013, had followed up with Dr. Byrd.  It appears that the patient had a history of postoperative atrial fibrillation which was short-lived back in [id number].  This was in the setting of a carotid procedure.  She denies any history of cardiac disease.  She lives in a [facility] community.  She is very functional for being 85, she says.  Denies any functional limitations.  No shortness of breath, syncope, presyncope or classic symptoms of angina.  In 01/2020, she had episodes of palpitations associated with severe chest discomfort.  She came to the emergency department for that.  By the time she arrived there, she was noted to be in sinus rhythm.  Clinically, no symptoms sound like atrial fibrillation.  At baseline, she takes aspirin and statin.  She was prescribed metoprolol as needed to be taken for palpitations.  She had a Zio Patch monitor put on in January which is now showing episodes of paroxysmal atrial fibrillation, burden being 2%, longest episode lasting 2 hours.  She had 1 triggered event which correlated with atrial fibrillation.      Otherwise, she denies any significant cardiovascular symptoms.  As mentioned, she is doing well for being 85.  Her last  LDL was 85.  Creatinine, potassium and hemoglobin have been normal in the recent past.  Denies any bleeding problems, falls or upcoming surgeries.      PHYSICAL EXAMINATION:  Deferred but blood pressure had been running high.  Today was 145/88.  She says this has been an issue recently.      ASSESSMENT AND PLAN:  Jennifer Whiteside is an 85-year-old female who had atrial fibrillation on her Zio Patch monitor.  She did have similar episodes of palpitations in January but this was associated with severe chest discomfort, she says.  Otherwise, she does not have any baseline heart failure symptoms, EP symptoms or angina.  At this point, we discussed rate versus rhythm control strategies and risks and benefits of anticoagulation.  Her CHADS-VASc score based on age of 85, hypertension, sex and carotid disease would be 5.  She denies any bleeding problems.  At this point, we have prescribed her metoprolol 25 mg b.i.d. to be taken on schedule rather than as needed.  I have prescribed her 2.5 mg twice a day of apixaban and I have stopped her aspirin concomitantly.  I will get a CBC checked in a month to ensure she is not having a drop in hemoglobin.  We will also get an echocardiogram and stress test to ensure there are no structural issues to explain the atrial fibrillation episode and her chest discomfort.  I will see her with these results in about a month and then space out visits depending how she is doing.     Please do not hesitate to contact me if you have any questions/concerns.     Sincerely,     Lucius Truong MD

## 2020-05-08 ENCOUNTER — HOSPITAL ENCOUNTER (OUTPATIENT)
Dept: CARDIOLOGY | Facility: CLINIC | Age: 85
Discharge: HOME | End: 2020-05-08
Attending: INTERNAL MEDICINE | Admitting: INTERNAL MEDICINE
Payer: COMMERCIAL

## 2020-05-08 ENCOUNTER — HOSPITAL ENCOUNTER (OUTPATIENT)
Dept: NUCLEAR MEDICINE | Facility: CLINIC | Age: 85
Setting detail: NUCLEAR MEDICINE
Discharge: HOME | End: 2020-05-08
Attending: INTERNAL MEDICINE | Admitting: INTERNAL MEDICINE
Payer: COMMERCIAL

## 2020-05-08 DIAGNOSIS — R00.0 TACHYCARDIA: ICD-10-CM

## 2020-05-08 DIAGNOSIS — I48.0 PAROXYSMAL ATRIAL FIBRILLATION (H): ICD-10-CM

## 2020-05-08 LAB
STRESS ECHO TARGET HR: 136
STRESS/REST PERFUSION RATIO: 1.01

## 2020-05-08 PROCEDURE — 93018 CV STRESS TEST I&R ONLY: CPT | Performed by: INTERNAL MEDICINE

## 2020-05-08 PROCEDURE — 78452 HT MUSCLE IMAGE SPECT MULT: CPT

## 2020-05-08 PROCEDURE — 93017 CV STRESS TEST TRACING ONLY: CPT

## 2020-05-08 PROCEDURE — A9502 TC99M TETROFOSMIN: HCPCS | Performed by: INTERNAL MEDICINE

## 2020-05-08 PROCEDURE — 78452 HT MUSCLE IMAGE SPECT MULT: CPT | Mod: 26 | Performed by: INTERNAL MEDICINE

## 2020-05-08 PROCEDURE — 34300033 ZZH RX 343: Performed by: INTERNAL MEDICINE

## 2020-05-08 PROCEDURE — 25000128 H RX IP 250 OP 636: Performed by: INTERNAL MEDICINE

## 2020-05-08 PROCEDURE — 93306 TTE W/DOPPLER COMPLETE: CPT

## 2020-05-08 PROCEDURE — 93306 TTE W/DOPPLER COMPLETE: CPT | Mod: 26 | Performed by: INTERNAL MEDICINE

## 2020-05-08 PROCEDURE — 93016 CV STRESS TEST SUPVJ ONLY: CPT | Performed by: INTERNAL MEDICINE

## 2020-05-08 RX ORDER — REGADENOSON 0.08 MG/ML
0.4 INJECTION, SOLUTION INTRAVENOUS ONCE
Status: COMPLETED | OUTPATIENT
Start: 2020-05-08 | End: 2020-05-08

## 2020-05-08 RX ORDER — REGADENOSON 0.08 MG/ML
INJECTION, SOLUTION INTRAVENOUS
Status: DISCONTINUED
Start: 2020-05-08 | End: 2020-05-09 | Stop reason: HOSPADM

## 2020-05-08 RX ADMIN — REGADENOSON 0.4 MG: 0.08 INJECTION, SOLUTION INTRAVENOUS at 12:37

## 2020-05-08 RX ADMIN — TETROFOSMIN 11 MCI.: 1.38 INJECTION, POWDER, LYOPHILIZED, FOR SOLUTION INTRAVENOUS at 10:51

## 2020-05-08 RX ADMIN — TETROFOSMIN 31 MCI.: 1.38 INJECTION, POWDER, LYOPHILIZED, FOR SOLUTION INTRAVENOUS at 12:41

## 2020-05-18 ENCOUNTER — CARE COORDINATION (OUTPATIENT)
Dept: CARDIOLOGY | Facility: CLINIC | Age: 85
End: 2020-05-18

## 2020-05-18 NOTE — PROGRESS NOTES
Pt called to review her stress test and echo results. She said her my chart was not working properly. I reviewed results with pt and send her an email to reactivate her my chart. PT confirmed she did start eliquis and stop ASA as instructed by . She said she will have her hgb lab done at [facility] on 5/27. I told pt that I don't see that she is scheduled for a lab on 5/27. Pt told me the  she spoke to previously told her they could see her on the schedule. I told her I will message scheduling to verify. Adan KELLER May 18, 2020, 2:42 PM

## 2020-05-27 ENCOUNTER — HOSPITAL ENCOUNTER (OUTPATIENT)
Dept: LAB | Facility: CLINIC | Age: 85
Discharge: HOME | End: 2020-05-27
Attending: INTERNAL MEDICINE | Admitting: INTERNAL MEDICINE
Payer: COMMERCIAL

## 2020-05-27 DIAGNOSIS — R00.0 TACHYCARDIA: ICD-10-CM

## 2020-05-27 DIAGNOSIS — I48.0 PAROXYSMAL ATRIAL FIBRILLATION (H): ICD-10-CM

## 2020-05-27 LAB
ERYTHROCYTE [DISTWIDTH] IN BLOOD BY AUTOMATED COUNT: 11.9 % (ref 10–15)
HCT VFR BLD AUTO: 39.5 % (ref 35–47)
HGB BLD-MCNC: 12.5 G/DL (ref 11.7–15.7)
MCH RBC QN AUTO: 32.1 PG (ref 26.5–33)
MCHC RBC AUTO-ENTMCNC: 31.6 G/DL (ref 31.5–36.5)
MCV RBC AUTO: 102 FL (ref 78–100)
PLATELET # BLD AUTO: 203 10E9/L (ref 150–450)
RBC # BLD AUTO: 3.89 10E12/L (ref 3.8–5.2)
WBC # BLD AUTO: 6.2 10E9/L (ref 4–11)

## 2020-05-27 PROCEDURE — 85027 COMPLETE CBC AUTOMATED: CPT | Performed by: INTERNAL MEDICINE

## 2020-05-27 PROCEDURE — 36415 COLL VENOUS BLD VENIPUNCTURE: CPT | Performed by: INTERNAL MEDICINE

## 2020-05-29 ENCOUNTER — VIRTUAL VISIT (OUTPATIENT)
Dept: CARDIOLOGY | Facility: CLINIC | Age: 85
End: 2020-05-29
Attending: INTERNAL MEDICINE
Payer: COMMERCIAL

## 2020-05-29 VITALS
HEART RATE: 72 BPM | HEIGHT: 65 IN | SYSTOLIC BLOOD PRESSURE: 148 MMHG | DIASTOLIC BLOOD PRESSURE: 74 MMHG | WEIGHT: 124 LBS | BODY MASS INDEX: 20.66 KG/M2

## 2020-05-29 DIAGNOSIS — I48.0 PAROXYSMAL ATRIAL FIBRILLATION (H): ICD-10-CM

## 2020-05-29 DIAGNOSIS — R00.0 TACHYCARDIA: ICD-10-CM

## 2020-05-29 PROCEDURE — 99213 OFFICE O/P EST LOW 20 MIN: CPT | Mod: 95 | Performed by: INTERNAL MEDICINE

## 2020-05-29 RX ORDER — DILTIAZEM HYDROCHLORIDE 240 MG/1
240 CAPSULE, EXTENDED RELEASE ORAL DAILY
Qty: 90 CAPSULE | Refills: 3 | Status: SHIPPED | OUTPATIENT
Start: 2020-05-29 | End: 2022-03-22 | Stop reason: DRUGHIGH

## 2020-05-29 ASSESSMENT — MIFFLIN-ST. JEOR: SCORE: 1013.34

## 2020-05-29 NOTE — PROGRESS NOTES
"Jennifer Whiteside is a 85 year old female who is being evaluated via a billable telephone visit.      The patient has been notified of following:     \"This telephone visit will be conducted via a call between you and your physician/provider. We have found that certain health care needs can be provided without the need for a physical exam.  This service lets us provide the care you need with a short phone conversation.  If a prescription is necessary we can send it directly to your pharmacy.  If lab work is needed we can place an order for that and you can then stop by our lab to have the test done at a later time.    Telephone visits are billed at different rates depending on your insurance coverage. During this emergency period, for some insurers they may be billed the same as an in-person visit.  Please reach out to your insurance provider with any questions.    If during the course of the call the physician/provider feels a telephone visit is not appropriate, you will not be charged for this service.\"    Patient has given verbal consent for Telephone visit?  Yes    What phone number would you like to be contacted at? [phone]    How would you like to obtain your AVS? MyChart    Bp: 148/74  Pulse:72  Wt:124.0lb    Review Of Systems  Skin: NEGATIVE  Eyes:Ears/Nose/Throat: Readers  Respiratory: NEGATIVE  Cardiovascular:Dizziness  Gastrointestinal: NEGATIVE  Genitourinary:NEGATIVE   Musculoskeletal: Arthritis  Neurologic: NEGATIVE  Psychiatric: NEGATIVE  Hematologic/Lymphatic/Immunologic: NEGATIVE  Endocrine:  NEGATIVE    Arcelia HYDE    Phone call duration: 7 minutes    Lucius Truong MD    HPI and Plan:   This is a very pleasant 85-year-old female with whom I am doing a telephone visit with in the setting of this coronavirus pandemic.  I have never met this patient before. I spoke to her last month on virtual visit. The patient, in the past, in 2013, had followed up with Dr. Byrd.  It " appears that the patient had a history of postoperative atrial fibrillation which was short-lived back in 2762-9934.  This was in the setting of a carotid procedure.  She denies any history of cardiac disease.  [place]  She is very functional for being 85, she says.      In 01/2020, she had episodes of palpitations associated with severe chest discomfort.  She came to the emergency department for that.  By the time she arrived there, she was noted to be in sinus rhythm.   At baseline, she used to take aspirin and statin.  She was prescribed metoprolol as needed to be taken for palpitations.  She had a Zio Patch monitor put on in January showing episodes of paroxysmal atrial fibrillation, burden being 2%, longest episode lasting 2 hours.  She had 1 triggered event which correlated with atrial fibrillation. Her last LDL was 85.      She spoke to me over the phone and we started anticoagulation and metoprolol. Given her atypical CP, we got echo and MPI both were fine. Today she feels well without symptoms. BP is staying high at home around 140-150 systolic. No other symptoms    PLAN  1. Doing well.   2. No symptoms.   3. BP is high, change metoprolol 25 bid to Diltiazem 240 daily long acting  4. RTC in 6 months   5. Call us if symptoms, high BP, or bleeding.    No orders of the defined types were placed in this encounter.    No orders of the defined types were placed in this encounter.    There are no discontinued medications.      Encounter Diagnoses   Name Primary?     Tachycardia      Paroxysmal atrial fibrillation (H)        CURRENT MEDICATIONS:  Current Outpatient Medications   Medication Sig Dispense Refill     alendronate 70 MG PO tablet 1 tablet weekly with 8 oz water. 12 tablet 3     apixaban ANTICOAGULANT (ELIQUIS) 2.5 MG tablet Take 1 tablet (2.5 mg) by mouth 2 times daily 60 tablet 3     GARLIC PO Take 2 tablets by mouth daily       metoprolol tartrate (LOPRESSOR) 25 MG tablet  Take 1 tablet (25 mg) by mouth 2 times daily 60 tablet 3     Multiple Vitamin (MULTI-VITAMIN) per tablet Take  by mouth. Equaline MVI - 1 daily 100 tablet 3     Omega-3 Fatty Acids (FISH OIL) 1200 MG capsule Take 1 capsule by mouth 2 times daily.       polyethylene glycol-propylene glycol (SYSTANE ULTRA) 0.4-0.3 % SOLN ophthalmic solution Place 1 drop into both eyes every hour as needed for dry eyes       pravastatin 40 MG PO tablet Take 1 tablet (40 mg) by mouth daily 90 tablet 3     sertraline (ZOLOFT) 50 MG tablet TAKE ONE TABLET BY MOUTH ONE TIME DAILY  90 tablet 1     Vitamin D, Cholecalciferol, 25 MCG (1000 UT) TABS        UNABLE TO FIND MEDICATION NAME: articoke leaves for cholesterol         ALLERGIES   No Known Allergies    PAST MEDICAL HISTORY:  Past Medical History:   Diagnosis Date     Atrial fibrillation (H) 5/2012    with RVR.  Occurred POD #2 following carotid enarterectomy.     Depressive disorder      Elevated LFTs     occurred on lipitor; resolved off medications     Esophageal stenosis      Hyperlipidaemia      Hyperlipidemia      Irregular heart beat      New onset atrial fibrillation (H) 5/13/2012     Osteoarthrosis      PAD (peripheral artery disease) (H)      PVD (peripheral vascular disease) (H)     s/p carotid enarterectomy 5-10-12     SVT (supraventricular tachycardia) (H) 4/21/2020       PAST SURGICAL HISTORY:  Past Surgical History:   Procedure Laterality Date     APPENDECTOMY      appy as a child     C HILLIARD W/O FACETEC FORAMOT/[facility] 1/2 VRT SEG, LUMBAR      L4     CLOSED REDUCTION WRIST Right 1/20/2016    Procedure: CLOSED REDUCTION WRIST;  Surgeon: Mina Brand MD;  Location:  OR     COLONOSCOPY  8/6/2013    Procedure: COMBINED COLONOSCOPY, SINGLE BIOPSY/POLYPECTOMY BY BIOPSY;  COLONOSCOPY with polypectomy using cold forceps.;  Surgeon: Madeline Oviedo MD;  Location:  GI     COLONOSCOPY  8/29/2016    Dr. Oviedo [facility]     COLONOSCOPY N/A 8/29/2016    Procedure: COMBINED  COLONOSCOPY, SINGLE OR MULTIPLE BIOPSY/POLYPECTOMY BY BIOPSY;  Surgeon: Madeline Oviedo MD;  Location: [facility] GI     ENDARTERECTOMY CAROTID  5/10/2012    LEFT, WITH EEG ; Surgeon:SELINA HANKS; Location: OR     ENT SURGERY      T&A as a child     HC CORRECT BUNION,SIMPLE      Right     HC REPAIR ROTATOR CUFF,ACUTE      Right     HC REPAIR ROTATOR CUFF,ACUTE      Left     HYSTERECTOMY, VAGINAL      simple      rt foot hammer toe repair         FAMILY HISTORY:  Family History   Problem Relation Age of Onset     Diabetes Brother      Diabetes Brother      C.A.D. Father          54 yo Cerebral hemorrhage     Hypertension Mother          84 yo     Arthritis Sister      Colon Cancer Sister      Family History Negative Son         Rheumatic fever     Family History Negative Son      Family History Negative Son      Family History Negative Daughter      Hypertension Daughter      Gynecology Daughter         D & C with issues uncertain       SOCIAL HISTORY:  Social History     Socioeconomic History     Marital status:      Spouse name: None     Number of children: 5     Years of education: None     Highest education level: None   Occupational History     Employer: RETIRED   Social Needs     Financial resource strain: None     Food insecurity     Worry: None     Inability: None     Transportation needs     Medical: None     Non-medical: None   Tobacco Use     Smoking status: Former Smoker     Packs/day: 0.50     Years: 3.00     Pack years: 1.50     Types: Cigarettes     Smokeless tobacco: Never Used     Tobacco comment: quit    Substance and Sexual Activity     Alcohol use: Yes     Comment: occ wine     Drug use: No     Sexual activity: Never   Lifestyle     Physical activity     Days per week: None     Minutes per session: None     Stress: None   Relationships     Social connections     Talks on phone: None     Gets together: None     Attends Yarsani service: None     Active  "member of club or organization: None     Attends meetings of clubs or organizations: None     Relationship status: None     Intimate partner violence     Fear of current or ex partner: None     Emotionally abused: None     Physically abused: None     Forced sexual activity: None   Other Topics Concern      Service Not Asked     Blood Transfusions Not Asked     Caffeine Concern No     Occupational Exposure Not Asked     Hobby Hazards Not Asked     Sleep Concern Not Asked     Stress Concern Not Asked     Weight Concern Not Asked     Special Diet Not Asked     Back Care Not Asked     Exercise Yes     Bike Helmet Yes     Comment: motorcycle     Seat Belt Yes     Self-Exams Yes     Parent/sibling w/ CABG, MI or angioplasty before 65F 55M? No   Social History Narrative     2004;     Enjoys traveling, motorcycling    5 children, 3 in [facility] area.       Review of Systems:  Skin:        Eyes:       ENT:       Respiratory:       Cardiovascular:       Gastroenterology:      Genitourinary:       Musculoskeletal:       Neurologic:       Psychiatric:       Heme/Lymph/Imm:       Endocrine:         Physical Exam:  Vitals: BP (!) 148/74   Pulse 72   Ht 1.651 m (5' 5\")   Wt 56.2 kg (124 lb)   BMI 20.63 kg/m      Recent Lab Results:  LIPID RESULTS:  Lab Results   Component Value Date    CHOL 155 02/20/2020    HDL 59 02/20/2020    LDL 85 02/20/2020    TRIG 57 02/20/2020    CHOLHDLRATIO 2.6 02/09/2015       LIVER ENZYME RESULTS:  Lab Results   Component Value Date    AST 15 12/23/2019    ALT 18 12/23/2019       CBC RESULTS:  Lab Results   Component Value Date    WBC 6.2 05/27/2020    RBC 3.89 05/27/2020    HGB 12.5 05/27/2020    HCT 39.5 05/27/2020     (H) 05/27/2020    MCH 32.1 05/27/2020    MCHC 31.6 05/27/2020    RDW 11.9 05/27/2020     05/27/2020       BMP RESULTS:  Lab Results   Component Value Date     02/20/2020    POTASSIUM 4.7 02/20/2020    CHLORIDE 102 02/20/2020    CO2 26 02/20/2020    " ANIONGAP 5 02/20/2020    GLC 91 02/20/2020    BUN 13 02/20/2020    CR 0.54 02/20/2020    GFRESTIMATED 86 02/20/2020    GFRESTBLACK >90 02/20/2020    MABEL 9.0 02/20/2020        A1C RESULTS:  Lab Results   Component Value Date    A1C 5.3 05/10/2012       INR RESULTS:  Lab Results   Component Value Date    INR 1.6 (A) 07/06/2012    INR 2.1 (A) 06/27/2012    INR 0.98 05/12/2012    INR 0.96 05/10/2012           CC  Lucius Truong MD  [address]  [place] [id number]

## 2020-05-29 NOTE — LETTER
5/29/2020    Carisa Waggoner MD  [address]  [facility] [address]    RE: Jennifer Whiteside       Dear Colleague,    I had the pleasure of seeing Jennifer Whiteside in the [facility].    Jennifer Whiteside is a 85 year old female who is being evaluated via a billable telephone visit.      This is a very pleasant 85-year-old female with whom I am doing a telephone visit with in the setting of this coronavirus pandemic.  I have never met this patient before. I spoke to her last month on virtual visit. The patient, in the past, in 2013, had followed up with Dr. Byrd.  It appears that the patient had a history of postoperative atrial fibrillation which was short-lived back in 2243-1998.  This was in the setting of a carotid procedure.  She denies any history of cardiac disease.  [place] lives in [place] [facility] community.  She is very functional for being 85, she says.      In 01/2020, she had episodes of palpitations associated with severe chest discomfort.  She came to the emergency department for that.  By the time she arrived there, she was noted to be in sinus rhythm.   At baseline, she used to take aspirin and statin.  She was prescribed metoprolol as needed to be taken for palpitations.  She had a Zio Patch monitor put on in January showing episodes of paroxysmal atrial fibrillation, burden being 2%, longest episode lasting 2 hours.  She had 1 triggered event which correlated with atrial fibrillation. Her last LDL was 85.      She spoke to me over the phone and we started anticoagulation and metoprolol. Given her atypical CP, we got echo and MPI both were fine. Today she feels well without symptoms. BP is staying high at home around 140-150 systolic. No other symptoms    PLAN  1. Doing well.   2. No symptoms.   3. BP is high, change metoprolol 25 bid to Diltiazem 240 daily long acting  4. RTC in 6 months   5. Call us if symptoms, high BP, or  bleeding.    No orders of the defined types were placed in this encounter.    No orders of the defined types were placed in this encounter.    There are no discontinued medications.      Encounter Diagnoses   Name Primary?     Tachycardia      Paroxysmal atrial fibrillation (H)        CURRENT MEDICATIONS:  Current Outpatient Medications   Medication Sig Dispense Refill     alendronate 70 MG PO tablet 1 tablet weekly with 8 oz water. 12 tablet 3     apixaban ANTICOAGULANT (ELIQUIS) 2.5 MG tablet Take 1 tablet (2.5 mg) by mouth 2 times daily 60 tablet 3     GARLIC PO Take 2 tablets by mouth daily       metoprolol tartrate (LOPRESSOR) 25 MG tablet Take 1 tablet (25 mg) by mouth 2 times daily 60 tablet 3     Multiple Vitamin (MULTI-VITAMIN) per tablet Take  by mouth. Equaline MVI - 1 daily 100 tablet 3     Omega-3 Fatty Acids (FISH OIL) 1200 MG capsule Take 1 capsule by mouth 2 times daily.       polyethylene glycol-propylene glycol (SYSTANE ULTRA) 0.4-0.3 % SOLN ophthalmic solution Place 1 drop into both eyes every hour as needed for dry eyes       pravastatin 40 MG PO tablet Take 1 tablet (40 mg) by mouth daily 90 tablet 3     sertraline (ZOLOFT) 50 MG tablet TAKE ONE TABLET BY MOUTH ONE TIME DAILY  90 tablet 1     Vitamin D, Cholecalciferol, 25 MCG (1000 UT) TABS        UNABLE TO FIND MEDICATION NAME: articoke leaves for cholesterol         ALLERGIES   No Known Allergies    PAST MEDICAL HISTORY:  Past Medical History:   Diagnosis Date     Atrial fibrillation (H) 5/2012    with RVR.  Occurred POD #2 following carotid enarterectomy.     Depressive disorder      Elevated LFTs     occurred on lipitor; resolved off medications     Esophageal stenosis      Hyperlipidaemia      Hyperlipidemia      Irregular heart beat      New onset atrial fibrillation (H) 5/13/2012     Osteoarthrosis      PAD (peripheral artery disease) (H)      PVD (peripheral vascular disease) (H)     s/p carotid enarterectomy 5-10-12     SVT  (supraventricular tachycardia) (H) 2020       PAST SURGICAL HISTORY:  Past Surgical History:   Procedure Laterality Date     APPENDECTOMY      appy as a child     C HILLIARD W/O FACETEC FORAMOT/[facility]  VRT SEG, LUMBAR      L4     CLOSED REDUCTION WRIST Right 2016    Procedure: CLOSED REDUCTION WRIST;  Surgeon: Mina Brand MD;  Location: [facility] OR     COLONOSCOPY  2013    Procedure: COMBINED COLONOSCOPY, SINGLE BIOPSY/POLYPECTOMY BY BIOPSY;  COLONOSCOPY with polypectomy using cold forceps.;  Surgeon: Madeline Oviedo MD;  Location:  GI     COLONOSCOPY  2016    Dr. Oviedo [facility]     COLONOSCOPY N/A 2016    Procedure: COMBINED COLONOSCOPY, SINGLE OR MULTIPLE BIOPSY/POLYPECTOMY BY BIOPSY;  Surgeon: Madeline Oviedo MD;  Location:  GI     ENDARTERECTOMY CAROTID  5/10/2012    LEFT, WITH EEG ; Surgeon:SELINA HANKS; Location: OR     ENT SURGERY      T&A as a child     HC CORRECT BUNION,SIMPLE      Right     HC REPAIR ROTATOR CUFF,ACUTE      Right     HC REPAIR ROTATOR CUFF,ACUTE      Left     HYSTERECTOMY, VAGINAL      simple      rt foot hammer toe repair         FAMILY HISTORY:  Family History   Problem Relation Age of Onset     Diabetes Brother      Diabetes Brother      C.A.D. Father          52 yo Cerebral hemorrhage     Hypertension Mother          86 yo     Arthritis Sister      Colon Cancer Sister      Family History Negative Son         Rheumatic fever     Family History Negative Son      Family History Negative Son      Family History Negative Daughter      Hypertension Daughter      Gynecology Daughter         D & C with issues uncertain       SOCIAL HISTORY:  Social History     Socioeconomic History     Marital status:      Spouse name: None     Number of children: 5     Years of education: None     Highest education level: None   Occupational History     Employer: RETIRED   Social Needs     Financial resource strain: None     Food insecurity  "    Worry: None     Inability: None     Transportation needs     Medical: None     Non-medical: None   Tobacco Use     Smoking status: Former Smoker     Packs/day: 0.50     Years: 3.00     Pack years: 1.50     Types: Cigarettes     Smokeless tobacco: Never Used     Tobacco comment: quit 1958   Substance and Sexual Activity     Alcohol use: Yes     Comment: occ wine     Drug use: No     Sexual activity: Never   Lifestyle     Physical activity     Days per week: None     Minutes per session: None     Stress: None   Relationships     Social connections     Talks on phone: None     Gets together: None     Attends Anglican service: None     Active member of club or organization: None     Attends meetings of clubs or organizations: None     Relationship status: None     Intimate partner violence     Fear of current or ex partner: None     Emotionally abused: None     Physically abused: None     Forced sexual activity: None   Other Topics Concern      Service Not Asked     Blood Transfusions Not Asked     Caffeine Concern No     Occupational Exposure Not Asked     Hobby Hazards Not Asked     Sleep Concern Not Asked     Stress Concern Not Asked     Weight Concern Not Asked     Special Diet Not Asked     Back Care Not Asked     Exercise Yes     Bike Helmet Yes     Comment: motorcycle     Seat Belt Yes     Self-Exams Yes     Parent/sibling w/ CABG, MI or angioplasty before 65F 55M? No   Social History Narrative     2004;     Enjoys traveling, motorcycling    5 children, 3 in [facility] area.       Review of Systems:  Skin:        Eyes:       ENT:       Respiratory:       Cardiovascular:       Gastroenterology:      Genitourinary:       Musculoskeletal:       Neurologic:       Psychiatric:       Heme/Lymph/Imm:       Endocrine:         Physical Exam:  Vitals: BP (!) 148/74   Pulse 72   Ht 1.651 m (5' 5\")   Wt 56.2 kg (124 lb)   BMI 20.63 kg/m      Recent Lab Results:  LIPID RESULTS:  Lab Results   Component " Value Date    CHOL 155 02/20/2020    HDL 59 02/20/2020    LDL 85 02/20/2020    TRIG 57 02/20/2020    CHOLHDLRATIO 2.6 02/09/2015       LIVER ENZYME RESULTS:  Lab Results   Component Value Date    AST 15 12/23/2019    ALT 18 12/23/2019       CBC RESULTS:  Lab Results   Component Value Date    WBC 6.2 05/27/2020    RBC 3.89 05/27/2020    HGB 12.5 05/27/2020    HCT 39.5 05/27/2020     (H) 05/27/2020    MCH 32.1 05/27/2020    MCHC 31.6 05/27/2020    RDW 11.9 05/27/2020     05/27/2020       BMP RESULTS:  Lab Results   Component Value Date     02/20/2020    POTASSIUM 4.7 02/20/2020    CHLORIDE 102 02/20/2020    CO2 26 02/20/2020    ANIONGAP 5 02/20/2020    GLC 91 02/20/2020    BUN 13 02/20/2020    CR 0.54 02/20/2020    GFRESTIMATED 86 02/20/2020    GFRESTBLACK >90 02/20/2020    MABEL 9.0 02/20/2020        A1C RESULTS:  Lab Results   Component Value Date    A1C 5.3 05/10/2012       INR RESULTS:  Lab Results   Component Value Date    INR 1.6 (A) 07/06/2012    INR 2.1 (A) 06/27/2012    INR 0.98 05/12/2012    INR 0.96 05/10/2012       Thank you for allowing me to participate in the care of your patient.    Sincerely,     Lucius Truong MD     [facility]

## 2020-06-10 ENCOUNTER — CARE COORDINATION (OUTPATIENT)
Dept: CARDIOLOGY | Facility: CLINIC | Age: 85
End: 2020-06-10

## 2020-06-10 NOTE — PROGRESS NOTES
Call from patient with questions about medication. Reviewed DR Truong phone visit and plan - patient was taking both metoprolol and diltiazem. She states her Blood pressures have been in 110-120/ 60's range, voiced no complaints, voiced confusion to plan. She states will now follow Dr Truong plan of switch from metoprolol to diltiazem and will call if concerns or blood pressure elevations..   Carisa Crockett RN 06/10/2020 3:11 PM

## 2020-07-29 ENCOUNTER — ANCILLARY PROCEDURE (OUTPATIENT)
Dept: BONE DENSITY | Facility: CLINIC | Age: 85
End: 2020-07-29
Attending: INTERNAL MEDICINE
Payer: COMMERCIAL

## 2020-07-29 DIAGNOSIS — Z78.0 ASYMPTOMATIC POSTMENOPAUSAL ESTROGEN DEFICIENCY: ICD-10-CM

## 2020-07-29 PROCEDURE — 77085 DXA BONE DENSITY AXL VRT FX: CPT | Performed by: INTERNAL MEDICINE

## 2020-08-31 ENCOUNTER — OFFICE VISIT (OUTPATIENT)
Dept: NURSING | Facility: CLINIC | Age: 85
End: 2020-08-31
Payer: COMMERCIAL

## 2020-08-31 VITALS
WEIGHT: 122 LBS | DIASTOLIC BLOOD PRESSURE: 67 MMHG | BODY MASS INDEX: 20.83 KG/M2 | HEART RATE: 86 BPM | OXYGEN SATURATION: 97 % | HEIGHT: 64 IN | SYSTOLIC BLOOD PRESSURE: 107 MMHG | TEMPERATURE: 98.6 F | RESPIRATION RATE: 16 BRPM

## 2020-08-31 DIAGNOSIS — Z23 ENCOUNTER FOR IMMUNIZATION: Primary | ICD-10-CM

## 2020-08-31 ASSESSMENT — MIFFLIN-ST. JEOR: SCORE: 988.39

## 2020-08-31 NOTE — NURSING NOTE
"/67 (BP Location: Right arm, Patient Position: Sitting, Cuff Size: Adult Regular)   Pulse 86   Temp 98.6  F (37  C) (Oral)   Resp 16   Ht 1.626 m (5' 4\")   Wt 55.3 kg (122 lb)   SpO2 97%   BMI 20.94 kg/m      "

## 2020-08-31 NOTE — PROGRESS NOTES
Subjective     Jennifer Whiteside is a 85 year old female who presents to clinic today for the following health issues:    HPI       {SUPERLIST (Optional):834921}  {additonal problems for provider to add (Optional):179350}    Review of Systems   {ROS COMP (Optional):498269}      Objective    There were no vitals taken for this visit.  There is no height or weight on file to calculate BMI.  Physical Exam   {Exam List (Optional):860208}    {Diagnostic Test Results (Optional):363371}        {PROVIDER CHARTING PREFERENCE:997276}

## 2020-09-01 DIAGNOSIS — I48.0 PAROXYSMAL ATRIAL FIBRILLATION (H): ICD-10-CM

## 2020-09-01 DIAGNOSIS — R00.0 TACHYCARDIA: ICD-10-CM

## 2020-09-17 NOTE — PROGRESS NOTES
"Subjective     Jennifer Whiteside is a 85 year old female who presents to clinic today for the following health issues:    HPI       ***  {additonal problems for provider to add (Optional):684349}    Review of Systems   {ROS COMP (Optional):507298}      Objective    /67 (BP Location: Right arm, Patient Position: Sitting, Cuff Size: Adult Regular)   Pulse 86   Temp 98.6  F (37  C) (Oral)   Resp 16   Ht 1.626 m (5' 4\")   Wt 55.3 kg (122 lb)   SpO2 97%   BMI 20.94 kg/m    Body mass index is 20.94 kg/m .  Physical Exam   {Exam List (Optional):072309}    {Diagnostic Test Results (Optional):661538}        {PROVIDER CHARTING PREFERENCE:356427}    "

## 2020-09-21 ENCOUNTER — HOSPITAL ENCOUNTER (OUTPATIENT)
Dept: CT IMAGING | Facility: CLINIC | Age: 85
End: 2020-09-21
Attending: PHYSICIAN ASSISTANT
Payer: COMMERCIAL

## 2020-09-21 ENCOUNTER — OFFICE VISIT (OUTPATIENT)
Dept: URGENT CARE | Facility: URGENT CARE | Age: 85
End: 2020-09-21
Payer: COMMERCIAL

## 2020-09-21 ENCOUNTER — HOSPITAL ENCOUNTER (OUTPATIENT)
Dept: LAB | Facility: CLINIC | Age: 85
End: 2020-09-21
Attending: PHYSICIAN ASSISTANT
Payer: COMMERCIAL

## 2020-09-21 VITALS
BODY MASS INDEX: 21.28 KG/M2 | TEMPERATURE: 98.4 F | SYSTOLIC BLOOD PRESSURE: 103 MMHG | HEART RATE: 80 BPM | WEIGHT: 124 LBS | OXYGEN SATURATION: 98 % | DIASTOLIC BLOOD PRESSURE: 58 MMHG

## 2020-09-21 DIAGNOSIS — K43.9 ABDOMINAL WALL HERNIA: Primary | ICD-10-CM

## 2020-09-21 DIAGNOSIS — K43.9 ABDOMINAL WALL HERNIA: ICD-10-CM

## 2020-09-21 LAB
CREAT SERPL-MCNC: 0.68 MG/DL (ref 0.52–1.04)
GFR SERPL CREATININE-BSD FRML MDRD: 80 ML/MIN/{1.73_M2}

## 2020-09-21 PROCEDURE — 74177 CT ABD & PELVIS W/CONTRAST: CPT

## 2020-09-21 PROCEDURE — 99215 OFFICE O/P EST HI 40 MIN: CPT | Performed by: PHYSICIAN ASSISTANT

## 2020-09-21 PROCEDURE — 36415 COLL VENOUS BLD VENIPUNCTURE: CPT | Performed by: PHYSICIAN ASSISTANT

## 2020-09-21 PROCEDURE — 82565 ASSAY OF CREATININE: CPT | Performed by: PHYSICIAN ASSISTANT

## 2020-09-21 PROCEDURE — 25000128 H RX IP 250 OP 636: Performed by: PHYSICIAN ASSISTANT

## 2020-09-21 RX ORDER — IOPAMIDOL 755 MG/ML
62 INJECTION, SOLUTION INTRAVASCULAR ONCE
Status: COMPLETED | OUTPATIENT
Start: 2020-09-21 | End: 2020-09-21

## 2020-09-21 RX ADMIN — IOPAMIDOL 62 ML: 755 INJECTION, SOLUTION INTRAVENOUS at 17:10

## 2020-09-21 NOTE — PROGRESS NOTES
CHIEF COMPLAINT:   Chief Complaint   Patient presents with     Urgent Care     Mass     found a lump in her lower left abdomen this morning       HPI: Jennifer Whiteside is a 85 year old female who presents to clinic today for evaluation of abdominal mass. Patient was taking a shower this AM when she was washing her abdomen and felt a mass on her left lower side. She has never felt this in the past. Denies having pain or discharge from the area. No fever or chills.     Past Medical History:   Diagnosis Date     Atrial fibrillation (H) 5/2012    with RVR.  Occurred POD #2 following carotid enarterectomy.     Depressive disorder      Elevated LFTs     occurred on lipitor; resolved off medications     Esophageal stenosis      Hyperlipidaemia      Hyperlipidemia      Irregular heart beat      New onset atrial fibrillation (H) 5/13/2012     Osteoarthrosis      PAD (peripheral artery disease) (H)      PVD (peripheral vascular disease) (H)     s/p carotid enarterectomy 5-10-12     SVT (supraventricular tachycardia) (H) 4/21/2020     Past Surgical History:   Procedure Laterality Date     APPENDECTOMY      appy as a child     C HILLIARD W/O FACETEC FORAMOT/[facility] 1/2 VRT SEG, LUMBAR      L4     CLOSED REDUCTION WRIST Right 1/20/2016    Procedure: CLOSED REDUCTION WRIST;  Surgeon: Mina Brand MD;  Location:  OR     COLONOSCOPY  8/6/2013    Procedure: COMBINED COLONOSCOPY, SINGLE BIOPSY/POLYPECTOMY BY BIOPSY;  COLONOSCOPY with polypectomy using cold forceps.;  Surgeon: Madeline Oviedo MD;  Location:  GI     COLONOSCOPY  8/29/2016    Dr. Oviedo [facility]     COLONOSCOPY N/A 8/29/2016    Procedure: COMBINED COLONOSCOPY, SINGLE OR MULTIPLE BIOPSY/POLYPECTOMY BY BIOPSY;  Surgeon: Madeline Oviedo MD;  Location:  GI     ENDARTERECTOMY CAROTID  5/10/2012    LEFT, WITH EEG ; Surgeon:SELINA HANKS; Location: OR     ENT SURGERY      T&A as a child     HC CORRECT BUNION,SIMPLE      Right     HC REPAIR ROTATOR  CUFF,ACUTE  2000    Right     HC REPAIR ROTATOR CUFF,ACUTE  2002    Left     HYSTERECTOMY, VAGINAL      simple      rt foot hammer toe repair  2005     Social History     Tobacco Use     Smoking status: Former Smoker     Packs/day: 0.50     Years: 3.00     Pack years: 1.50     Types: Cigarettes     Smokeless tobacco: Never Used     Tobacco comment: quit 1958   Substance Use Topics     Alcohol use: Yes     Comment: occ wine     Current Outpatient Medications   Medication     alendronate 70 MG PO tablet     apixaban ANTICOAGULANT (ELIQUIS) 2.5 MG tablet     diltiazem ER (DILT-XR) 240 MG 24 hr ER beaded capsule     GARLIC PO     Multiple Vitamin (MULTI-VITAMIN) per tablet     Omega-3 Fatty Acids (FISH OIL) 1200 MG capsule     polyethylene glycol-propylene glycol (SYSTANE ULTRA) 0.4-0.3 % SOLN ophthalmic solution     pravastatin 40 MG PO tablet     sertraline (ZOLOFT) 50 MG tablet     UNABLE TO FIND     Vitamin D, Cholecalciferol, 25 MCG (1000 UT) TABS     No current facility-administered medications for this visit.      No Known Allergies    10 point ROS of systems including Constitutional, Eyes, Respiratory, Cardiovascular, Gastroenterology, Genitourinary, Integumentary, Muscularskeletal, Psychiatric were all negative except for pertinent positives noted in my HPI.        Exam:  /58   Pulse 80   Temp 98.4  F (36.9  C) (Temporal)   Wt 56.2 kg (124 lb)   SpO2 98%   BMI 21.28 kg/m    Constitutional: healthy, alert and no distress  Head: Normocephalic, atraumatic.  ENT: MMM  Cardiovascular: RRR  Respiratory: CTA bilaterally, no rhonchi or rales  Gastrointestinal: soft and nontender. Mass palpated in left lower quadrant. It is reduced when she lays down.   Skin: no rashes  Neurologic: Speech clear, gait normal. Moves all extremities.    Results for orders placed or performed during the hospital encounter of 09/21/20   CT Abdomen Pelvis w Contrast     Status: None    Narrative    CT ABDOMEN PELVIS WITH CONTRAST  9/21/2020 5:20 PM    CLINICAL HISTORY: Hernia, complicated. Abdominal wall hernia.    TECHNIQUE: CT scan of the abdomen and pelvis was performed following  injection of IV contrast. Multiplanar reformats were obtained. Dose  reduction techniques were used.  CONTRAST: 62mL Isovue-370    COMPARISON: None    FINDINGS:   LOWER CHEST: Unremarkable.    HEPATOBILIARY: Normal.    PANCREAS: Normal.    SPLEEN: Normal.    ADRENAL GLANDS: Normal.    KIDNEYS/BLADDER: Normal.    BOWEL: Normal.    PELVIC ORGANS: Hysterectomy.    ADDITIONAL FINDINGS: Trace pelvic free fluid.    MUSCULOSKELETAL: Tiny fat-containing umbilical hernia. No other  abdominal wall hernias demonstrated. No worrisome lytic or blastic  bone lesions. Degenerative changes of the spine.      Impression    IMPRESSION: Tiny umbilical hernia but no other abdominal wall hernia.    MONICA CARRANZA MD   Results for orders placed or performed during the hospital encounter of 09/21/20   Creatinine     Status: None   Result Value Ref Range    Creatinine 0.68 0.52 - 1.04 mg/dL    GFR Estimate 80 >60 mL/min/[1.73_m2]    GFR Estimate If Black >90 >60 mL/min/[1.73_m2]         ASSESSMENT/PLAN:  1. Abdominal wall hernia  85 year old female presents to the clinic for evaluation of abd wall mass. On exam, when patient is standing, a left lower quadrant mass can be palpated. This completely disappears when she lays down. I am able to reduce the mass when she is standing as well. Patient does not have any TTP. No evidence of incarceration or strangulation. Will obtain CT of abd to confirm hernia.   Referral to gen surg was placed to discuss options for treatment.   RED FLAG symptoms discussed.   - CT Abdomen Pelvis w Contrast; Future  - GENERAL SURG ADULT REFERRAL; Future    Diagnosis and treatment plan was reviewed with patient and/or family.   We went over any labs or imaging.   Patient verbalizes understanding. All questions were addressed and answered.   Hilda Deshpande,  KIRBY      Addend: Spoke to patient about CT results. Surprisingly, no evidence of hernia. Ok to cancel appointment with surgery. Continue to monitor and follow-up with Dr. Waggoner in the next several weeks.   Hilda Deshpande PA-C

## 2020-09-21 NOTE — PATIENT INSTRUCTIONS
Patient Education     Hernia (Adult)    A hernia can happen when there is a weakness or defect in the wall of the abdomen or groin. Intestines or nearby tissues may move from their usual location and push through the weakness in the wall. This can cause a hernia (bulge) you may see or feel.  Causes and risk factors   A hernia may be present at birth. Or it may be caused by the wear and tear of daily living. Certain factors can make a hernia more likely. These can include:    Heavy lifting    Straining, whether from lifting, movement, or constipation    Chronic cough    Injury to the abdominal wall    Excess weight    Pregnancy    Prior surgery    Older age    Family history of hernia  Symptoms  Symptoms of a hernia may come on suddenly. Or they may appear slowly over time. Some common symptoms include:    Bulge in the groin area, around the navel, or in the scrotum (the bulge may get bigger when you stand and go away when you lie down)    Pain or pressure around the bulge    Pain during activities such as lifting, coughing, or sneezing    A feeling of weakness or pressure in the groin    Pain or swelling in the scrotum  Types of hernias  There are different types of hernia. The type you have depends on its location:    Inguinal. This type is in the groin or scrotum. It is more common in men. But, women can get this hernia, too.    Femoral. This type is in the groin, upper thigh (where the leg bends), or labia. It is more common in women.    Ventral. This type is in the abdominal wall.    Umbilical. This type occurs around the navel (belly button).    Incisional. This type occurs at the site of a previous surgery.  The condition of the hernia can help determine how urgently it needs to be treated.    Reducible. It goes back in by itself, or it can be pushed back in.    Irreducible. It can t be pushed back in.    Incarcerated/strangulated. The intestine is trapped (incarcerated). If this happens, you won t be able  to push the bulge back in. If the incarcerated hernia isn t treated, it may become strangulated. This means the area loses blood supply and the tissue may die. This requires emergency surgery. You need treatment right away.  In most cases, a hernia will not heal on its own.You may need surgery to repair the defect in the abdominal wall or groin. You ll be told more about surgery, if needed.  If your symptoms are not severe, treatment may sometimes be delayed. In such cases, you will need regular follow-up visits with the provider. You ll be asked to keep track of your symptoms and to watch for signs of more serious problems. You may also be given guidelines similar to the home care instructions below.  Home care  To help keep a hernia from getting worse, you may be advised to:    Avoid heavy lifting and straining as directed.    Take steps to prevent constipation, such as eating more fiber and drinking more water. This may help reduce straining that can occur when having a bowel movement. Reducing straining may help keep your symptoms from getting worse.    Maintain a healthy weight or lose excess weight. This can help reduce strain on abdominal muscles and tissues.    Stop smoking. This can help prevent coughing that may also strain abdominal muscles and tissues.  Follow-up care  Follow up with your healthcare provider, or as directed. If imaging tests were done, they will be reviewed a doctor. You will be told the results and any new findings that may affect your care.  When to seek medical advice  Call your healthcare provider right away if any of these occur:    Hernia hardens, swells, or grows larger    Hernia can no longer be pushed back in    Pain moves to the lower right abdomen (just below the waistline), or spreads to the back  Call 911  Call 911 if any of these occur:    Severe pain, redness, or tenderness in the area near the hernia    Pain worsens quickly and doesn t get better    Inability to have a  bowel movement or pass gas    Fever of 100.4 F (38 C) or higher, or as directed by your healthcare provider  Date Last Reviewed: 3/1/2018    8421-5862 [employer] [address] [place] [address], [place] [address]. All rights reserved. This information is not intended as a substitute for professional medical care. Always follow your healthcare professional's instructions.

## 2020-10-20 ENCOUNTER — OFFICE VISIT (OUTPATIENT)
Dept: INTERNAL MEDICINE | Facility: CLINIC | Age: 85
End: 2020-10-20
Payer: COMMERCIAL

## 2020-10-20 VITALS
BODY MASS INDEX: 21.05 KG/M2 | HEIGHT: 64 IN | TEMPERATURE: 98 F | OXYGEN SATURATION: 96 % | RESPIRATION RATE: 18 BRPM | SYSTOLIC BLOOD PRESSURE: 146 MMHG | HEART RATE: 91 BPM | DIASTOLIC BLOOD PRESSURE: 75 MMHG | WEIGHT: 123.3 LBS

## 2020-10-20 DIAGNOSIS — K43.9 ABDOMINAL WALL HERNIA: Primary | ICD-10-CM

## 2020-10-20 DIAGNOSIS — K64.4 EXTERNAL HEMORRHOIDS: ICD-10-CM

## 2020-10-20 DIAGNOSIS — L98.9 SKIN LESION: ICD-10-CM

## 2020-10-20 PROCEDURE — 99213 OFFICE O/P EST LOW 20 MIN: CPT | Performed by: INTERNAL MEDICINE

## 2020-10-20 ASSESSMENT — MIFFLIN-ST. JEOR: SCORE: 986.35

## 2020-10-20 NOTE — NURSING NOTE
"BP (!) 146/75 (BP Location: Left arm, Patient Position: Sitting, Cuff Size: Adult Regular)   Pulse 91   Temp 98  F (36.7  C) (Oral)   Resp 18   Ht 1.613 m (5' 3.5\")   Wt 55.9 kg (123 lb 4.8 oz)   SpO2 96%   BMI 21.50 kg/m      "

## 2020-10-20 NOTE — PROGRESS NOTES
Subjective     Jennifer Whiteside is a 85 year old female who presents to clinic today for the following health issues:    HPI         ED/UC Followup:    Facility:  Urgent care  Date of visit: 9/21/20  Reason for visit: abdominal hernia  Current Status: stable     She had noted a bulge lower abdomen so went to UC. She had CT which did not show anything but they did not have her strain during the test to try to show hernia.   This is not painful, just notices it.     She also is complaining of external hemorrhoids that can vary. They are uncomfortable but not painful.       Patient Active Problem List   Diagnosis     Generalized osteoarthrosis, unspecified site     Dyspepsia     Hyperlipidemia LDL goal <70     Advance Care Planning     Paroxysmal atrial fibrillation (H)     Bilateral carotid artery disease, unspecified type (H)     Osteopenia of multiple sites     SVT (supraventricular tachycardia) (H)     Current Outpatient Medications   Medication Sig Dispense Refill     alendronate 70 MG PO tablet 1 tablet weekly with 8 oz water. 12 tablet 3     apixaban ANTICOAGULANT (ELIQUIS) 2.5 MG tablet Take 1 tablet (2.5 mg) by mouth 2 times daily 180 tablet 1     diltiazem ER (DILT-XR) 240 MG 24 hr ER beaded capsule Take 1 capsule (240 mg) by mouth daily 90 capsule 3     GARLIC PO Take 2 tablets by mouth daily       Multiple Vitamin (MULTI-VITAMIN) per tablet Take  by mouth. Equaline MVI - 1 daily 100 tablet 3     Omega-3 Fatty Acids (FISH OIL) 1200 MG capsule Take 1 capsule by mouth 2 times daily.       polyethylene glycol-propylene glycol (SYSTANE ULTRA) 0.4-0.3 % SOLN ophthalmic solution Place 1 drop into both eyes every hour as needed for dry eyes       pravastatin 40 MG PO tablet Take 1 tablet (40 mg) by mouth daily 90 tablet 3     sertraline (ZOLOFT) 50 MG tablet TAKE ONE TABLET BY MOUTH ONE TIME DAILY  90 tablet 0     Vitamin D, Cholecalciferol, 25 MCG (1000 UT) TABS        UNABLE TO FIND MEDICATION NAME:  "articoke leaves for cholesterol          Social History     Tobacco Use     Smoking status: Former Smoker     Packs/day: 0.50     Years: 3.00     Pack years: 1.50     Types: Cigarettes     Smokeless tobacco: Never Used     Tobacco comment: quit 1958   Substance Use Topics     Alcohol use: Yes     Comment: occ wine     Drug use: No        Review of Systems   No nausea, vomiting, bowel changes       Objective    BP (!) 146/75 (BP Location: Left arm, Patient Position: Sitting, Cuff Size: Adult Regular)   Pulse 91   Temp 98  F (36.7  C) (Oral)   Resp 18   Ht 1.613 m (5' 3.5\")   Wt 55.9 kg (123 lb 4.8 oz)   SpO2 96%   BMI 21.50 kg/m    Body mass index is 21.5 kg/m .  Physical Exam       Abd: there is a fairly large left mid to lower abdominal hernia         Assessment & Plan     Abdominal wall hernia  Would not recommend surgery, reassured no other cause noted on CT,     External hemorrhoids  Advised fiber, sitz baths, topical agent, if worsening see colon rectal  - COLORECTAL SURGERY REFERRAL    Skin lesion  She would like to see derm for skin check  - DERMATOLOGY ADULT REFERRAL; Future            Return in about 4 months (around 2/21/2021) for Wellness visit.    Carisa Waggoner MD  [facility]    "

## 2020-10-27 ENCOUNTER — TRANSFERRED RECORDS (OUTPATIENT)
Dept: HEALTH INFORMATION MANAGEMENT | Facility: CLINIC | Age: 85
End: 2020-10-27

## 2020-10-28 DIAGNOSIS — F41.9 ANXIETY: ICD-10-CM

## 2020-11-09 ENCOUNTER — CARE COORDINATION (OUTPATIENT)
Dept: CARDIOLOGY | Facility: CLINIC | Age: 85
End: 2020-11-09

## 2020-11-09 DIAGNOSIS — I48.0 PAROXYSMAL ATRIAL FIBRILLATION (H): Primary | ICD-10-CM

## 2020-11-09 NOTE — PROGRESS NOTES
Received call from patient stating she thinks the diltiazem is causing her dizziness and unsteadiness on her feet, with some shortness of breath. Patient states her blood pressure at 0830 was 147/78. She states she then developed these symptoms and rechecked her blood pressure and noted 113/61 around 1030 AM. Patient reports her heart rate is typically in the low 80s. She does not notice any changes in her heart rate when she feels these symptoms. Patient states she held a dose of diltiazem yesterday and notes she felt better.  Patient states she is worried about unsteadiness on her feet with winter coming. I also discussed with patient whether she has any signs/symptoms of bleeding as she's on Eliquis. Her blood pressure and heart rates seem adequate. Patient reports she does have hemorrhoids and has discussed possible banding of those in the future, but states she has not pursued this further as she needs to approve an Eliquis hold through Dr. Truong. She states she has not had excess bleeding noted from the hemorrhoids lately. Patient is due for 6 month follow up with Dr. Truong so I set patient up for a visit on 11/23. Will update Dr. Truong to see if he'd like any changes or labs done prior to that appt.

## 2020-11-10 RX ORDER — DILTIAZEM HYDROCHLORIDE 120 MG/1
120 CAPSULE, EXTENDED RELEASE ORAL DAILY
Qty: 90 CAPSULE | Refills: 0 | Status: SHIPPED | OUTPATIENT
Start: 2020-11-10 | End: 2020-12-01 | Stop reason: ALTCHOICE

## 2020-11-10 NOTE — PROGRESS NOTES
Call out to patient with recommendations. Patient will take her BP and hr twice a day, and additionally if she's symptomatic. If any concerns with her numbers or symptoms prior to her appointment she will call us. Patient will bring her BP and HR log to her appointment. New Rx sent to her pharmacy as the diltiazem is a capsule.      Lucius Truong MD  You 3 hours ago (10:15 AM)        1. In person visit on 11/23 if possible  2. Change Diltiazem to 120 mg daily and see HR trend and BP         Documentation

## 2020-11-10 NOTE — TELEPHONE ENCOUNTER
1. In person visit on 11/23 if possible  2. Change Diltiazem to 120 mg daily and see HR trend and BP

## 2020-11-23 ENCOUNTER — OFFICE VISIT (OUTPATIENT)
Dept: CARDIOLOGY | Facility: CLINIC | Age: 85
End: 2020-11-23
Payer: COMMERCIAL

## 2020-11-23 VITALS
DIASTOLIC BLOOD PRESSURE: 85 MMHG | OXYGEN SATURATION: 97 % | SYSTOLIC BLOOD PRESSURE: 162 MMHG | BODY MASS INDEX: 20.49 KG/M2 | HEIGHT: 64 IN | HEART RATE: 89 BPM | WEIGHT: 120 LBS

## 2020-11-23 DIAGNOSIS — I48.0 PAROXYSMAL ATRIAL FIBRILLATION (H): Primary | ICD-10-CM

## 2020-11-23 PROCEDURE — 99214 OFFICE O/P EST MOD 30 MIN: CPT | Performed by: INTERNAL MEDICINE

## 2020-11-23 ASSESSMENT — MIFFLIN-ST. JEOR: SCORE: 979.32

## 2020-11-23 NOTE — LETTER
11/23/2020    Carisa Waggoner MD  [address]  [facility] [address]    RE: Jennifer Whiteside       Dear Colleague,    I had the pleasure of seeing Jennifer Whiteside in the [facility].    [id number]    HPI and Plan:   See dictation    Orders Placed This Encounter   Procedures     Follow-Up with Cardiac Advanced Practice Provider     Follow-Up with Cardiologist     Orders Placed This Encounter   Medications     LATANOPROST OP     Medications Discontinued During This Encounter   Medication Reason     polyethylene glycol-propylene glycol (SYSTANE ULTRA) 0.4-0.3 % SOLN ophthalmic solution Medication Reconciliation Clean Up         Encounter Diagnosis   Name Primary?     Paroxysmal atrial fibrillation (H) Yes       CURRENT MEDICATIONS:  Current Outpatient Medications   Medication Sig Dispense Refill     alendronate 70 MG PO tablet 1 tablet weekly with 8 oz water. 12 tablet 3     apixaban ANTICOAGULANT (ELIQUIS) 2.5 MG tablet Take 1 tablet (2.5 mg) by mouth 2 times daily 180 tablet 1     diltiazem ER (TIAZAC) 120 MG 24 hr ER beaded capsule Take 1 capsule (120 mg) by mouth daily 90 capsule 0     GARLIC PO Take 2 tablets by mouth daily       LATANOPROST OP        Multiple Vitamin (MULTI-VITAMIN) per tablet Take  by mouth. Equaline MVI - 1 daily 100 tablet 3     Omega-3 Fatty Acids (FISH OIL) 1200 MG capsule Take 1 capsule by mouth 2 times daily.       pravastatin 40 MG PO tablet Take 1 tablet (40 mg) by mouth daily 90 tablet 3     sertraline (ZOLOFT) 50 MG tablet TAKE ONE TABLET BY MOUTH ONE TIME DAILY 90 tablet 0     Vitamin D, Cholecalciferol, 25 MCG (1000 UT) TABS          ALLERGIES   No Known Allergies    PAST MEDICAL HISTORY:  Past Medical History:   Diagnosis Date     Atrial fibrillation (H) 5/2012    with RVR.  Occurred POD #2 following carotid enarterectomy.     Depressive disorder      Elevated LFTs     occurred on lipitor; resolved off medications     Esophageal  stenosis      Hyperlipidaemia      Hyperlipidemia      Irregular heart beat      New onset atrial fibrillation (H) 2012     Osteoarthrosis      PAD (peripheral artery disease) (H)      PVD (peripheral vascular disease) (H)     s/p carotid enarterectomy 5-10-12     SVT (supraventricular tachycardia) (H) 2020       PAST SURGICAL HISTORY:  Past Surgical History:   Procedure Laterality Date     APPENDECTOMY      appy as a child     C HILLIARD W/O FACETEC FORAMOT/[facility]  VRT SEG, LUMBAR      L4     CLOSED REDUCTION WRIST Right 2016    Procedure: CLOSED REDUCTION WRIST;  Surgeon: Mina Brand MD;  Location: [facility] OR     COLONOSCOPY  2013    Procedure: COMBINED COLONOSCOPY, SINGLE BIOPSY/POLYPECTOMY BY BIOPSY;  COLONOSCOPY with polypectomy using cold forceps.;  Surgeon: Madeline Oviedo MD;  Location:  GI     COLONOSCOPY  2016    Dr. Oviedo [facility]     COLONOSCOPY N/A 2016    Procedure: COMBINED COLONOSCOPY, SINGLE OR MULTIPLE BIOPSY/POLYPECTOMY BY BIOPSY;  Surgeon: Madeline Oviedo MD;  Location:  GI     ENDARTERECTOMY CAROTID  5/10/2012    LEFT, WITH EEG ; Surgeon:SELINA HANKS; Location: OR     ENT SURGERY      T&A as a child     HC CORRECT BUNION,SIMPLE      Right     HC REPAIR ROTATOR CUFF,ACUTE      Right     HC REPAIR ROTATOR CUFF,ACUTE      Left     HYSTERECTOMY, VAGINAL      simple      rt foot hammer toe repair         FAMILY HISTORY:  Family History   Problem Relation Age of Onset     Diabetes Brother      Diabetes Brother      C.A.D. Father          52 yo Cerebral hemorrhage     Hypertension Mother          86 yo     Arthritis Sister      Colon Cancer Sister      Family History Negative Son         Rheumatic fever     Family History Negative Son      Family History Negative Son      Family History Negative Daughter      Hypertension Daughter      Gynecology Daughter         D & C with issues uncertain       SOCIAL HISTORY:  Social History      Socioeconomic History     Marital status:      Spouse name: None     Number of children: 5     Years of education: None     Highest education level: None   Occupational History     Employer: RETIRED   Social Needs     Financial resource strain: None     Food insecurity     Worry: None     Inability: None     Transportation needs     Medical: None     Non-medical: None   Tobacco Use     Smoking status: Former Smoker     Packs/day: 0.50     Years: 3.00     Pack years: 1.50     Types: Cigarettes     Smokeless tobacco: Never Used     Tobacco comment: quit 1958   Substance and Sexual Activity     Alcohol use: Yes     Comment: occ wine     Drug use: No     Sexual activity: Never   Lifestyle     Physical activity     Days per week: None     Minutes per session: None     Stress: None   Relationships     Social connections     Talks on phone: None     Gets together: None     Attends Christian service: None     Active member of club or organization: None     Attends meetings of clubs or organizations: None     Relationship status: None     Intimate partner violence     Fear of current or ex partner: None     Emotionally abused: None     Physically abused: None     Forced sexual activity: None   Other Topics Concern      Service Not Asked     Blood Transfusions Not Asked     Caffeine Concern No     Occupational Exposure Not Asked     Hobby Hazards Not Asked     Sleep Concern Not Asked     Stress Concern Not Asked     Weight Concern Not Asked     Special Diet Not Asked     Back Care Not Asked     Exercise Yes     Bike Helmet Yes     Comment: motorcycle     Seat Belt Yes     Self-Exams Yes     Parent/sibling w/ CABG, MI or angioplasty before 65F 55M? No   Social History Narrative     2004;     Enjoys traveling, motorcycling    5 children, 3 in [facility] area.       Review of Systems:  Skin:  Negative     Eyes:  Positive for glaucoma;glasses  ENT:  Positive for hearing loss  Respiratory:  Positive for  "dyspnea on exertion  Cardiovascular:    lightheadedness;Positive for;palpitations;chest pain  Gastroenterology: Negative    Genitourinary:  Negative    Musculoskeletal:  Negative    Neurologic:  Positive for numbness or tingling of feet  Psychiatric:  Positive for excessive stress;anxiety;depression  Heme/Lymph/Imm:  Negative    Endocrine:  Negative      Physical Exam:  Vitals: BP (!) 162/85 (BP Location: Right arm, Patient Position: Sitting, Cuff Size: Adult Regular)   Pulse 89   Ht 1.626 m (5' 4\")   Wt 54.4 kg (120 lb)   SpO2 97%   BMI 20.60 kg/m      Constitutional:           Skin:             Head:           Eyes:           Lymph:      ENT:           Neck:           Respiratory:            Cardiac:                                                           GI:           Extremities and Muscular Skeletal:                 Neurological:           Psych:           Recent Lab Results:  LIPID RESULTS:  Lab Results   Component Value Date    CHOL 155 02/20/2020    HDL 59 02/20/2020    LDL 85 02/20/2020    TRIG 57 02/20/2020    CHOLHDLRATIO 2.6 02/09/2015       LIVER ENZYME RESULTS:  Lab Results   Component Value Date    AST 15 12/23/2019    ALT 18 12/23/2019       CBC RESULTS:  Lab Results   Component Value Date    WBC 6.2 05/27/2020    RBC 3.89 05/27/2020    HGB 12.5 05/27/2020    HCT 39.5 05/27/2020     (H) 05/27/2020    MCH 32.1 05/27/2020    MCHC 31.6 05/27/2020    RDW 11.9 05/27/2020     05/27/2020       BMP RESULTS:  Lab Results   Component Value Date     02/20/2020    POTASSIUM 4.7 02/20/2020    CHLORIDE 102 02/20/2020    CO2 26 02/20/2020    ANIONGAP 5 02/20/2020    GLC 91 02/20/2020    BUN 13 02/20/2020    CR 0.68 09/21/2020    GFRESTIMATED 80 09/21/2020    GFRESTBLACK >90 09/21/2020    MABEL 9.0 02/20/2020        A1C RESULTS:  Lab Results   Component Value Date    A1C 5.3 05/10/2012       INR RESULTS:  Lab Results   Component Value Date    INR 1.6 (A) 07/06/2012    INR 2.1 (A) 06/27/2012 "    INR 0.98 05/12/2012    INR 0.96 05/10/2012           CC  No referring provider defined for this encounter.                      Thank you for allowing me to participate in the care of your patient.      Sincerely,     Lucius Truong MD     [facility]    cc:   No referring provider defined for this encounter.
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
## 2020-11-29 ENCOUNTER — APPOINTMENT (OUTPATIENT)
Dept: GENERAL RADIOLOGY | Facility: CLINIC | Age: 85
End: 2020-11-29
Attending: EMERGENCY MEDICINE
Payer: COMMERCIAL

## 2020-11-29 ENCOUNTER — HOSPITAL ENCOUNTER (EMERGENCY)
Facility: CLINIC | Age: 85
Discharge: HOME | End: 2020-11-29
Attending: EMERGENCY MEDICINE | Admitting: EMERGENCY MEDICINE
Payer: COMMERCIAL

## 2020-11-29 VITALS
TEMPERATURE: 98.3 F | OXYGEN SATURATION: 94 % | WEIGHT: 117 LBS | SYSTOLIC BLOOD PRESSURE: 172 MMHG | HEART RATE: 79 BPM | HEIGHT: 63 IN | DIASTOLIC BLOOD PRESSURE: 94 MMHG | BODY MASS INDEX: 20.73 KG/M2 | RESPIRATION RATE: 14 BRPM

## 2020-11-29 DIAGNOSIS — R42 EPISODE OF DIZZINESS: ICD-10-CM

## 2020-11-29 LAB
ANION GAP SERPL CALCULATED.3IONS-SCNC: 5 MMOL/L (ref 3–14)
BASOPHILS # BLD AUTO: 0.1 10E9/L (ref 0–0.2)
BASOPHILS NFR BLD AUTO: 1 %
BUN SERPL-MCNC: 12 MG/DL (ref 7–30)
CALCIUM SERPL-MCNC: 8.6 MG/DL (ref 8.5–10.1)
CHLORIDE SERPL-SCNC: 106 MMOL/L (ref 94–109)
CO2 SERPL-SCNC: 27 MMOL/L (ref 20–32)
CREAT SERPL-MCNC: 0.56 MG/DL (ref 0.52–1.04)
DIFFERENTIAL METHOD BLD: ABNORMAL
EOSINOPHIL # BLD AUTO: 0.2 10E9/L (ref 0–0.7)
EOSINOPHIL NFR BLD AUTO: 2.9 %
ERYTHROCYTE [DISTWIDTH] IN BLOOD BY AUTOMATED COUNT: 11.9 % (ref 10–15)
GFR SERPL CREATININE-BSD FRML MDRD: 85 ML/MIN/{1.73_M2}
GLUCOSE SERPL-MCNC: 115 MG/DL (ref 70–99)
HCT VFR BLD AUTO: 37.6 % (ref 35–47)
HGB BLD-MCNC: 12.3 G/DL (ref 11.7–15.7)
IMM GRANULOCYTES # BLD: 0 10E9/L (ref 0–0.4)
IMM GRANULOCYTES NFR BLD: 0.2 %
INTERPRETATION ECG - MUSE: NORMAL
LYMPHOCYTES # BLD AUTO: 1.1 10E9/L (ref 0.8–5.3)
LYMPHOCYTES NFR BLD AUTO: 18.2 %
MCH RBC QN AUTO: 32.8 PG (ref 26.5–33)
MCHC RBC AUTO-ENTMCNC: 32.7 G/DL (ref 31.5–36.5)
MCV RBC AUTO: 100 FL (ref 78–100)
MONOCYTES # BLD AUTO: 0.6 10E9/L (ref 0–1.3)
MONOCYTES NFR BLD AUTO: 9.1 %
NEUTROPHILS # BLD AUTO: 4.2 10E9/L (ref 1.6–8.3)
NEUTROPHILS NFR BLD AUTO: 68.6 %
NRBC # BLD AUTO: 0 10*3/UL
NRBC BLD AUTO-RTO: 0 /100
NT-PROBNP SERPL-MCNC: 258 PG/ML (ref 0–1800)
PLATELET # BLD AUTO: 226 10E9/L (ref 150–450)
POTASSIUM SERPL-SCNC: 3.8 MMOL/L (ref 3.4–5.3)
RBC # BLD AUTO: 3.75 10E12/L (ref 3.8–5.2)
SODIUM SERPL-SCNC: 138 MMOL/L (ref 133–144)
WBC # BLD AUTO: 6.1 10E9/L (ref 4–11)

## 2020-11-29 PROCEDURE — 80048 BASIC METABOLIC PNL TOTAL CA: CPT | Performed by: EMERGENCY MEDICINE

## 2020-11-29 PROCEDURE — C9803 HOPD COVID-19 SPEC COLLECT: HCPCS

## 2020-11-29 PROCEDURE — 99285 EMERGENCY DEPT VISIT HI MDM: CPT | Mod: 25

## 2020-11-29 PROCEDURE — 71045 X-RAY EXAM CHEST 1 VIEW: CPT

## 2020-11-29 PROCEDURE — 85025 COMPLETE CBC W/AUTO DIFF WBC: CPT | Performed by: EMERGENCY MEDICINE

## 2020-11-29 PROCEDURE — U0003 INFECTIOUS AGENT DETECTION BY NUCLEIC ACID (DNA OR RNA); SEVERE ACUTE RESPIRATORY SYNDROME CORONAVIRUS 2 (SARS-COV-2) (CORONAVIRUS DISEASE [COVID-19]), AMPLIFIED PROBE TECHNIQUE, MAKING USE OF HIGH THROUGHPUT TECHNOLOGIES AS DESCRIBED BY CMS-2020-01-R: HCPCS | Performed by: EMERGENCY MEDICINE

## 2020-11-29 PROCEDURE — 83880 ASSAY OF NATRIURETIC PEPTIDE: CPT | Performed by: EMERGENCY MEDICINE

## 2020-11-29 ASSESSMENT — ENCOUNTER SYMPTOMS
BACK PAIN: 0
CHILLS: 0
DIZZINESS: 1
FEVER: 0
WEAKNESS: 1
SHORTNESS OF BREATH: 1
LIGHT-HEADEDNESS: 1
COUGH: 0

## 2020-11-29 ASSESSMENT — MIFFLIN-ST. JEOR: SCORE: 949.84

## 2020-11-29 NOTE — ED TRIAGE NOTES
Patient was started on diltiazem for atrial fibrillation two weeks ago.  Since then has had shortness of breath, dizziness and weakness.      Saw PMD and dose was cut in half.  Patient continues to complain of symptoms.      Had no shortness of breath or dizziness prior to starting medication.      ABCs intact.  Alert and oriented x 3.

## 2020-11-29 NOTE — ED PROVIDER NOTES
History     Chief Complaint:  Dizziness and Shortness of Breath       HPI  Jennifer Whiteside is a 85 year old female with a history of atrial fibrillation and hypertension who presents for evaluation of shortness of breath and dizziness. The patient reports that every morning when he stands up she feels short of breath with associated light headedness, dizziness, unsteadiness, and weakness. Her symptoms have been progressively getting worse. Her symptoms are resolved by the afternoon. She notes that her heart rate increases to 100 bpm and her blood pressure varies from 167-144 systolic. Two weeks ago she had a recent change in her the dosage of Diltiazem to 120 mg daily and her shortness of breath was not relieved. She denies any back pain, jaw pain, chest pain, or cold symptoms.      Allergies:  No Known Drug Allergies      Medications:   Alendronate  Eliquis  Tiazac  Latanoprost   Pravastatin  Zoloft      Medical History:   Atrial fibrillation    Depressive disorder   Elevated LFTs   Esophageal stenosis   Hyperlipidemia   Hyperlipidemia   Irregular heart beat    Osteoarthrosis   Peripheral artery disease  Peripheral vascular disease   Supraventricular tachycardia   Dyspepsia     Surgical History   Appendectomy  C marquez W/O facetec foramot/Dskc 1/2 vrt seg, lumbar  Closed reduction wrist   Colonoscopy x3  Endarterectomy carotid  ENT surgery   Hc repair rotator cuff, acute x2  Hysterectomy  Rt foot hammer toe repair    Family History:   Brother: diabetes  Father: C.A.D  Mother: hypertension  Sister: arthritis, colon cancer  Daughter: hypertension    Social History:  The patient presented alone  Smoking Status: Former smoker  Smokeless Tobacco: Never Used  Alcohol Use: Positive  Drug Use: Negative  PCP: Carisa Waggoner      Review of Systems   Constitutional: Negative for chills and fever.   Respiratory: Positive for shortness of breath. Negative for cough.    Cardiovascular: Negative for chest pain.  "  Musculoskeletal: Positive for gait problem. Negative for back pain.        No jaw pain   Neurological: Positive for dizziness, weakness and light-headedness.   All other systems reviewed and are negative.    Physical Exam     Patient Vitals for the past 24 hrs:   BP Temp Temp src Pulse Resp SpO2 Height Weight   11/29/20 1510 -- -- -- 79 14 94 % -- --   11/29/20 1500 (!) 172/94 -- -- 81 13 96 % -- --   11/29/20 1450 -- -- -- 87 17 97 % -- --   11/29/20 1440 (!) 177/88 -- -- 85 21 97 % -- --   11/29/20 1430 -- -- -- 91 17 99 % -- --   11/29/20 1420 -- -- -- 79 15 99 % -- --   11/29/20 1357 (!) 165/91 98.3  F (36.8  C) Temporal 99 20 99 % 1.6 m (5' 3\") 53.1 kg (117 lb)        Physical Exam  Constitutional: Alert, attentive  HENT:    Nose: Nose normal.    Mouth/Throat: Oropharynx is clear, mucous membranes are moist   Eyes: EOM are normal.   CV: regular rate and rhythm; no murmurs, rubs or gallups  Chest: Effort normal and breath sounds normal.   GI:  There is no tenderness. No distension. Normal bowel sounds  MSK: Normal range of motion. No LE edema  Neurological: Alert, attentive  Skin: Skin is warm and dry.    Emergency Department Course     ECG:  ECG taken at 1403  Normal sinus rhythm  Possible left atrial enlargement  Borderline ECG  When compared with ECG of 08-May-2020 RI interval has decreased  Questionable change in QRS axis  Rate 82 bpm. RI interval 172 ms. QRS duration 80 ms. QT/QTc 368/429 ms. P-R-T axes 60 -15 60.     Imaging:  Radiology results were communicated with the patient who voiced understanding of the findings.     XR Chest Port 1 View  No acute disease.    SANTA KRISHNAN MD  Reading per radiology     Laboratory:  Laboratory findings were communicated with the patient who voiced understanding of the findings.    CBC: WBC 6.1, HGB 12.3,   BMP: glucose 115 (H) o/w WNL (Creatinine 0.56)  BNP: 258  Symptomatic COVID-19 Virus (Coronavirus) by PCR Nasopharyngeal swab: Pending    Emergency " Department Course:    1403 EKG obtained as noted above.     1427 Nursing notes and vitals reviewed. I performed an exam of the patient as documented above.     1444 A nasopharyngeal sample was obtained for laboratory testing as documented above.     1509 IV was inserted and blood was drawn for laboratory testing, results above.    The patient was sent for XR while in the emergency department, results above.     Findings and plan explained to the Patient. Patient discharged home with instructions regarding supportive care, medications, and reasons to return. The importance of close follow-up was reviewed. The patient was prescribed as below.    Impression & Plan     Medical Decision Making:  This is a pleasant 85-year-old female with history of proximal atrial fibrillation on Eliquis and hypertension who presents for evaluation of dizziness episodes with occasional dyspnea, all positional in nature (upon standing) and occurring in the mornings into early afternoon. Symptoms are subacute in nature, lasting at least 1 month.  Temporally, they correlate to the patient taking her first diltiazem dose in the morning.  Of note, however, this could also correspond to her previous day's dose having worn off and elevated blood pressure and/or heart rate causing dizziness.  Recently her diltiazem dose was reduced that she did not tolerate the higher dose.  Upon presentation her symptoms are resolved, as is usual based on her experience of symptoms resolving in the afternoon.  Chest x-ray shows no acute process.  Strongly doubt PE based on consistent Eliquis use.  EKG shows sinus rhythm.  Symptoms are not consistent with ischemia as they are entirely positional.  Discussed either alternative dosing, such as nightly so that she largely sleeps through symptoms, versus alternative medication.  She will call her cardiologist tomorrow to further discuss this issue is averse to changing to a nightly dose to this evening.  Covid is  also considered although she has no constitutional symptoms or infectious prodrome.  Plan discharge home with home isolation until testing results are received.  Call cardiology tomorrow.  Primary care follow-up in 2 to 3 days.  Return precautions for chest pain, shortness of breath, or any other concerns.      Covid-19  Jennifer Whiteside was evaluated during a global COVID-19 pandemic, which necessitated consideration that the patient might be at risk for infection with the SARS-CoV-2 virus that causes COVID-19.   Applicable protocols for evaluation were followed during the patient's care.   COVID-19 was considered as part of the patient's evaluation. The plan for testing is:  a test was obtained during this visit.      Diagnosis:     ICD-10-CM    1. Episode of dizziness  R42         Disposition:  Discharged to home.    Discharge Medications:  There are no discharge medications.     Scribe Disclosure:  I, Ritu Snwo, am serving as a scribe at 2:25 PM on 11/29/2020 to document services personally performed by Anthony Schmidt based on my observations and the provider's statements to me.     Anthony Luke RD, MD  11/29/20 2491

## 2020-11-29 NOTE — ED AVS SNAPSHOT
[facility]  [address]  [facility] [address]  Phone: [phone]  Fax: [phone]                                    Jennifer Whiteside   MRN: [id number]    Department: [facility]   Date of Visit: 11/29/2020           After Visit Summary Signature Page    I have received my discharge instructions, and my questions have been answered. I have discussed any challenges I see with this plan with the nurse or doctor.    ..........................................................................................................................................  Patient/Patient Representative Signature      ..........................................................................................................................................  Patient Representative Print Name and Relationship to Patient    ..................................................               ................................................  Date                                   Time    ..........................................................................................................................................  Reviewed by Signature/Title    ...................................................              ..............................................  Date                                               Time          22EPIC Rev 08/18

## 2020-11-30 ENCOUNTER — CARE COORDINATION (OUTPATIENT)
Dept: CARDIOLOGY | Facility: CLINIC | Age: 85
End: 2020-11-30

## 2020-11-30 DIAGNOSIS — I47.10 SVT (SUPRAVENTRICULAR TACHYCARDIA) (H): ICD-10-CM

## 2020-11-30 DIAGNOSIS — I48.0 PAROXYSMAL ATRIAL FIBRILLATION (H): Primary | ICD-10-CM

## 2020-11-30 LAB
SARS-COV-2 RNA SPEC QL NAA+PROBE: NOT DETECTED
SPECIMEN SOURCE: NORMAL

## 2020-11-30 NOTE — PROGRESS NOTES
"[facility]    Patient left voicemail stating she presented to the [facility] ED yesterday for shortness of breath, dizziness, unsteadiness on her feet. Patient states she checked out ok in the ED but would like a call back to discuss her symptoms further. Returned the call, no answer. Left voicemail for patient with call back number.         Addendum  3:40 PM    Returned the call to patient. She reports feeling \"frustrated\" by her symptoms of shortness of breath, dizziness and unsteadiness on her feet. Patient reports her symptoms are worse in the morning and seem to improve throughout the day, although they never completely resolve. Patient states she believes her symptoms started shortly after she switched to diltiazem this Spring. Patient believes her symptoms are related to diltiazem, and thinks she felt better while on metoprolol. Patient's blood pressure has been high, and in the ED was 160s systolic and 90s diastolic. Several labs were drawn and EKG was read at [facility] with a rate of 82. A COVID test is still pending. Patient states her blood pressure cuff is not working so she is unsure what her blood pressure is but her heart rate is in the 80s today. Patient states she is wanting to completely discontinue the diltiazem. I let patient know that if her blood pressure were to elevate further with discontinuing the diltiazem then she could have worsening shortness of breath and other symptoms. Patient states the ED MD told her to change her diltiazem to the evening to see if that helps, and said she should be seen within 3 days. Patient will try taking her diltiazem in the evening starting tonight. Scheduled patient for a follow up w/Dr. Truong on Wednesday in clinic. Routed update to Dr. Truong. Patient denies any symptoms consistent with COVID-19 at this time, other than shortness of breath which has been ongoing. Her test is still pending.   "

## 2020-12-01 RX ORDER — METOPROLOL TARTRATE 25 MG/1
25 TABLET, FILM COATED ORAL 2 TIMES DAILY
Qty: 180 TABLET | Refills: 3 | Status: SHIPPED | DISCHARGE
Start: 2020-12-01 | End: 2021-02-18

## 2020-12-01 NOTE — PROGRESS NOTES
Called patient with update/recommendations from Dr. Truong. Patient states she would like to discontinue the diltiazem and change back to metoprolol tartrate 25 mg BID. Patient has this Rx on hand as she was on this Rx previously. She does not want a new Rx sent to her pharmacy now as she is in the donut hole. She will call us when she is need of a refill. Patient confirmed her appt tomorrow and does not have any COVID-19 like symptoms. Her COVID test done in the ED on 11/29/20 came back as not detected. Med list updated.    Lucius Truong MD  You 41 minutes ago (1:52 PM)        Can see her tomorrow. She can stop her diltiazem and switch to metoprolol 25 BID to see if that is better for her.          Documentation

## 2020-12-01 NOTE — TELEPHONE ENCOUNTER
Can see her tomorrow. She can stop her diltiazem and switch to metoprolol 25 BID to see if that is better for her.

## 2020-12-02 ENCOUNTER — OFFICE VISIT (OUTPATIENT)
Dept: CARDIOLOGY | Facility: CLINIC | Age: 85
End: 2020-12-02
Payer: COMMERCIAL

## 2020-12-02 VITALS
DIASTOLIC BLOOD PRESSURE: 88 MMHG | WEIGHT: 119 LBS | SYSTOLIC BLOOD PRESSURE: 144 MMHG | HEART RATE: 86 BPM | HEIGHT: 64 IN | BODY MASS INDEX: 20.32 KG/M2

## 2020-12-02 DIAGNOSIS — I48.0 PAROXYSMAL ATRIAL FIBRILLATION (H): Primary | ICD-10-CM

## 2020-12-02 PROCEDURE — 99213 OFFICE O/P EST LOW 20 MIN: CPT | Performed by: INTERNAL MEDICINE

## 2020-12-02 ASSESSMENT — MIFFLIN-ST. JEOR: SCORE: 966.84

## 2020-12-02 NOTE — LETTER
12/2/2020    Carisa Waggoner MD  [address]  [facility] [address]    RE: Jennifer Whiteside       Dear Colleague,    I had the pleasure of seeing Jennifer Whiteside in the [facility].    HPI and Plan:   See dictation [id number]    Orders Placed This Encounter   Procedures     Follow-Up with Cardiologist     No orders of the defined types were placed in this encounter.    There are no discontinued medications.      Encounter Diagnosis   Name Primary?     Paroxysmal atrial fibrillation (H) Yes       CURRENT MEDICATIONS:  Current Outpatient Medications   Medication Sig Dispense Refill     alendronate 70 MG PO tablet 1 tablet weekly with 8 oz water. 12 tablet 3     apixaban ANTICOAGULANT (ELIQUIS) 2.5 MG tablet Take 1 tablet (2.5 mg) by mouth 2 times daily 180 tablet 1     GARLIC PO Take 2 tablets by mouth daily       LATANOPROST OP        metoprolol tartrate (LOPRESSOR) 25 MG tablet Take 1 tablet (25 mg) by mouth 2 times daily 180 tablet 3     Multiple Vitamin (MULTI-VITAMIN) per tablet Take  by mouth. Equaline MVI - 1 daily 100 tablet 3     Omega-3 Fatty Acids (FISH OIL) 1200 MG capsule Take 1 capsule by mouth 2 times daily.       pravastatin 40 MG PO tablet Take 1 tablet (40 mg) by mouth daily 90 tablet 3     sertraline (ZOLOFT) 50 MG tablet TAKE ONE TABLET BY MOUTH ONE TIME DAILY 90 tablet 0     Vitamin D, Cholecalciferol, 25 MCG (1000 UT) TABS          ALLERGIES   No Known Allergies    PAST MEDICAL HISTORY:  Past Medical History:   Diagnosis Date     Atrial fibrillation (H) 5/2012    with RVR.  Occurred POD #2 following carotid enarterectomy.     Depressive disorder      Elevated LFTs     occurred on lipitor; resolved off medications     Esophageal stenosis      Hyperlipidaemia      Hyperlipidemia      Irregular heart beat      New onset atrial fibrillation (H) 5/13/2012     Osteoarthrosis      PAD (peripheral artery disease) (H)      PVD (peripheral vascular  disease) (H)     s/p carotid enarterectomy 5-10-12     SVT (supraventricular tachycardia) (H) 2020       PAST SURGICAL HISTORY:  Past Surgical History:   Procedure Laterality Date     APPENDECTOMY      appy as a child     C HILLIARD W/O FACETEC FORAMOT/DSKC  VRT SEG, LUMBAR      L4     CLOSED REDUCTION WRIST Right 2016    Procedure: CLOSED REDUCTION WRIST;  Surgeon: Mina Brand MD;  Location: [facility]     COLONOSCOPY  2013    Procedure: COMBINED COLONOSCOPY, SINGLE BIOPSY/POLYPECTOMY BY BIOPSY;  COLONOSCOPY with polypectomy using cold forceps.;  Surgeon: Madeline Oviedo MD;  Location: [facility]     COLONOSCOPY  2016    Dr. Oviedo [facility]     COLONOSCOPY N/A 2016    Procedure: COMBINED COLONOSCOPY, SINGLE OR MULTIPLE BIOPSY/POLYPECTOMY BY BIOPSY;  Surgeon: Madeline Oviedo MD;  Location: [facility]     ENDARTERECTOMY CAROTID  5/10/2012    LEFT, WITH EEG ; Surgeon:SELINA HANKS; Location: OR     ENT SURGERY      T&A as a child     HC CORRECT BUNION,SIMPLE      Right     HC REPAIR ROTATOR CUFF,ACUTE      Right     HC REPAIR ROTATOR CUFF,ACUTE      Left     HYSTERECTOMY, VAGINAL      simple      rt foot hammer toe repair         FAMILY HISTORY:  Family History   Problem Relation Age of Onset     Diabetes Brother      Diabetes Brother      C.A.D. Father          52 yo Cerebral hemorrhage     Hypertension Mother          84 yo     Arthritis Sister      Colon Cancer Sister      Family History Negative Son         Rheumatic fever     Family History Negative Son      Family History Negative Son      Family History Negative Daughter      Hypertension Daughter      Gynecology Daughter         D & C with issues uncertain       SOCIAL HISTORY:  Social History     Socioeconomic History     Marital status:      Spouse name: None     Number of children: 5     Years of education: None     Highest education level: None   Occupational History     Employer: [employer]   Social  Needs     Financial resource strain: None     Food insecurity     Worry: None     Inability: None     Transportation needs     Medical: None     Non-medical: None   Tobacco Use     Smoking status: Former Smoker     Packs/day: 0.50     Years: 3.00     Pack years: 1.50     Types: Cigarettes     Smokeless tobacco: Never Used     Tobacco comment: quit 1958   Substance and Sexual Activity     Alcohol use: Yes     Comment: occ wine     Drug use: No     Sexual activity: Never   Lifestyle     Physical activity     Days per week: None     Minutes per session: None     Stress: None   Relationships     Social connections     Talks on phone: None     Gets together: None     Attends Orthodox service: None     Active member of club or organization: None     Attends meetings of clubs or organizations: None     Relationship status: None     Intimate partner violence     Fear of current or ex partner: None     Emotionally abused: None     Physically abused: None     Forced sexual activity: None   Other Topics Concern      Service Not Asked     Blood Transfusions Not Asked     Caffeine Concern No     Occupational Exposure Not Asked     Hobby Hazards Not Asked     Sleep Concern Not Asked     Stress Concern Not Asked     Weight Concern Not Asked     Special Diet Not Asked     Back Care Not Asked     Exercise Yes     Bike Helmet Yes     Comment: motorcycle     Seat Belt Yes     Self-Exams Yes     Parent/sibling w/ CABG, MI or angioplasty before 65F 55M? No   Social History Narrative     2004;     Enjoys traveling, motorcycling    5 children, 3 in [facility] area.       Review of Systems:  Skin:  Negative     Eyes:  Positive for glaucoma;glasses  ENT:  Positive for hearing loss  Respiratory:  Positive for dyspnea on exertion  Cardiovascular:    lightheadedness;Positive for;palpitations;chest pain  Gastroenterology: Negative    Genitourinary:  Negative    Musculoskeletal:  Negative    Neurologic:  Positive for numbness or  "tingling of feet  Psychiatric:  Positive for excessive stress;anxiety;depression  Heme/Lymph/Imm:  Negative    Endocrine:  Negative      Physical Exam:  Vitals: BP (!) 144/88   Pulse 86   Ht 1.613 m (5' 3.5\")   Wt 54 kg (119 lb)   BMI 20.75 kg/m      Recent Lab Results:  LIPID RESULTS:  Lab Results   Component Value Date    CHOL 155 02/20/2020    HDL 59 02/20/2020    LDL 85 02/20/2020    TRIG 57 02/20/2020    CHOLHDLRATIO 2.6 02/09/2015       LIVER ENZYME RESULTS:  Lab Results   Component Value Date    AST 15 12/23/2019    ALT 18 12/23/2019       CBC RESULTS:  Lab Results   Component Value Date    WBC 6.1 11/29/2020    RBC 3.75 (L) 11/29/2020    HGB 12.3 11/29/2020    HCT 37.6 11/29/2020     11/29/2020    MCH 32.8 11/29/2020    MCHC 32.7 11/29/2020    RDW 11.9 11/29/2020     11/29/2020       BMP RESULTS:  Lab Results   Component Value Date     11/29/2020    POTASSIUM 3.8 11/29/2020    CHLORIDE 106 11/29/2020    CO2 27 11/29/2020    ANIONGAP 5 11/29/2020     (H) 11/29/2020    BUN 12 11/29/2020    CR 0.56 11/29/2020    GFRESTIMATED 85 11/29/2020    GFRESTBLACK >90 11/29/2020    MABEL 8.6 11/29/2020        A1C RESULTS:  Lab Results   Component Value Date    A1C 5.3 05/10/2012       INR RESULTS:  Lab Results   Component Value Date    INR 1.6 (A) 07/06/2012    INR 2.1 (A) 06/27/2012    INR 0.98 05/12/2012    INR 0.96 05/10/2012           CC  No referring provider defined for this encounter.                      Thank you for allowing me to participate in the care of your patient.      Sincerely,     Lucius Truong MD     [facility]    cc:   No referring provider defined for this encounter.        "

## 2020-12-02 NOTE — PROGRESS NOTES
Service Date: 12/02/2020      REASON FOR VISIT:  Paroxysmal atrial fibrillation.      HISTORY OF PRESENT ILLNESS:  Jennifer is a very pleasant 85-year-old female who is seeing me after an ED visit.  She has a history of paroxysmal atrial fibrillation that was diagnosed this year on a Zio Patch.  She subsequently had been on metoprolol and anticoagulation with Eliquis 2.5 b.i.d.  She has a history of hypertension because of which her metoprolol has been switched to diltiazem 240, but then she started having lightheadedness and then eventually was cut down to 120.  In any case, she was last seen by me and was relatively doing okay on 11/23/2020.  However, a week after that, she started feeling significantly lightheaded, short of breath, dizzy and went to the emergency department.  She was noted to be in sinus rhythm, and subsequently, she was switched off from diltiazem onto metoprolol 25 b.i.d. again and has been feeling just fine.  She was set up for a visit today because she was in the ED for those complaints last week.  Jennifer is feeling fine today.  She overall has no cardiac symptoms.      PHYSICAL EXAMINATION:   VITAL SIGNS:  Blood pressure is 144/88 with a pulse of 86.   GENERAL:  Alert and oriented x3, in no acute distress.   NECK:  Supple.  JVP is normal.   LUNGS:  Clear.  Nonlabored.   EXTREMITIES:  No edema.   PSYCHIATRIC:  Appropriate affect.   ABDOMEN:  Nondistended.      ASSESSMENT AND PLAN:  Keiko is 85 with a history of paroxysmal atrial fibrillation.  At this point, she does not seem to like diltiazem very well.  It is causing her to have dizziness SOB despite normal BP during those times.  As such, I will continue metoprolol 25 b.i.d.  She has an appointment with Teri Alfred on 12/23/2020.  I have told her to write down her blood pressure readings, and if more than 140/90, we could probably increase metoprolol to 37.5 twice a day or perhaps add low-dose of hydrochlorothiazide or chlorthalidone to  her regimen.  I do not think she tolerates calcium channel blockers very well so I will probably avoid amlodipine.  Otherwise, I will see her back in a year for followup, sooner if anything changes.         ESHA STEPHENS MD             D: 2020   T: 2020   MT: bashir      Name:     LUCY PERRY   MRN:      [id number]        Account:      [id number]   :      1935           Service Date: 2020      Document: [id number]

## 2020-12-02 NOTE — PROGRESS NOTES
HPI and Plan:   See dictation [id number]    Orders Placed This Encounter   Procedures     Follow-Up with Cardiologist     No orders of the defined types were placed in this encounter.    There are no discontinued medications.      Encounter Diagnosis   Name Primary?     Paroxysmal atrial fibrillation (H) Yes       CURRENT MEDICATIONS:  Current Outpatient Medications   Medication Sig Dispense Refill     alendronate 70 MG PO tablet 1 tablet weekly with 8 oz water. 12 tablet 3     apixaban ANTICOAGULANT (ELIQUIS) 2.5 MG tablet Take 1 tablet (2.5 mg) by mouth 2 times daily 180 tablet 1     GARLIC PO Take 2 tablets by mouth daily       LATANOPROST OP        metoprolol tartrate (LOPRESSOR) 25 MG tablet Take 1 tablet (25 mg) by mouth 2 times daily 180 tablet 3     Multiple Vitamin (MULTI-VITAMIN) per tablet Take  by mouth. Equaline MVI - 1 daily 100 tablet 3     Omega-3 Fatty Acids (FISH OIL) 1200 MG capsule Take 1 capsule by mouth 2 times daily.       pravastatin 40 MG PO tablet Take 1 tablet (40 mg) by mouth daily 90 tablet 3     sertraline (ZOLOFT) 50 MG tablet TAKE ONE TABLET BY MOUTH ONE TIME DAILY 90 tablet 0     Vitamin D, Cholecalciferol, 25 MCG (1000 UT) TABS          ALLERGIES   No Known Allergies    PAST MEDICAL HISTORY:  Past Medical History:   Diagnosis Date     Atrial fibrillation (H) 5/2012    with RVR.  Occurred POD #2 following carotid enarterectomy.     Depressive disorder      Elevated LFTs     occurred on lipitor; resolved off medications     Esophageal stenosis      Hyperlipidaemia      Hyperlipidemia      Irregular heart beat      New onset atrial fibrillation (H) 5/13/2012     Osteoarthrosis      PAD (peripheral artery disease) (H)      PVD (peripheral vascular disease) (H)     s/p carotid enarterectomy 5-10-12     SVT (supraventricular tachycardia) (H) 4/21/2020       PAST SURGICAL HISTORY:  Past Surgical History:   Procedure Laterality Date     APPENDECTOMY      appy as a child     C HILLIARD W/O FACETEC  FORAMOT/DSKC  VRT SEG, LUMBAR      L4     CLOSED REDUCTION WRIST Right 2016    Procedure: CLOSED REDUCTION WRIST;  Surgeon: Mina Brand MD;  Location: [facility]     COLONOSCOPY  2013    Procedure: COMBINED COLONOSCOPY, SINGLE BIOPSY/POLYPECTOMY BY BIOPSY;  COLONOSCOPY with polypectomy using cold forceps.;  Surgeon: Madeline Oviedo MD;  Location: [facility]     COLONOSCOPY  2016    Dr. Oviedo [facility]     COLONOSCOPY N/A 2016    Procedure: COMBINED COLONOSCOPY, SINGLE OR MULTIPLE BIOPSY/POLYPECTOMY BY BIOPSY;  Surgeon: Madeline Oviedo MD;  Location: [facility]     ENDARTERECTOMY CAROTID  5/10/2012    LEFT, WITH EEG ; Surgeon:SELINA HANKS; Location: OR     ENT SURGERY      T&A as a child     HC CORRECT BUNION,SIMPLE      Right     HC REPAIR ROTATOR CUFF,ACUTE      Right     HC REPAIR ROTATOR CUFF,ACUTE      Left     HYSTERECTOMY, VAGINAL      simple      rt foot hammer toe repair         FAMILY HISTORY:  Family History   Problem Relation Age of Onset     Diabetes Brother      Diabetes Brother      C.A.D. Father          52 yo Cerebral hemorrhage     Hypertension Mother          86 yo     Arthritis Sister      Colon Cancer Sister      Family History Negative Son         Rheumatic fever     Family History Negative Son      Family History Negative Son      Family History Negative Daughter      Hypertension Daughter      Gynecology Daughter         D & C with issues uncertain       SOCIAL HISTORY:  Social History     Socioeconomic History     Marital status:      Spouse name: None     Number of children: 5     Years of education: None     Highest education level: None   Occupational History     Employer: RETIRED   Social Needs     Financial resource strain: None     Food insecurity     Worry: None     Inability: None     Transportation needs     Medical: None     Non-medical: None   Tobacco Use     Smoking status: Former Smoker     Packs/day: 0.50     Years: 3.00  "    Pack years: 1.50     Types: Cigarettes     Smokeless tobacco: Never Used     Tobacco comment: quit 1958   Substance and Sexual Activity     Alcohol use: Yes     Comment: occ wine     Drug use: No     Sexual activity: Never   Lifestyle     Physical activity     Days per week: None     Minutes per session: None     Stress: None   Relationships     Social connections     Talks on phone: None     Gets together: None     Attends [employer] service: None     Active member of club or organization: None     Attends meetings of clubs or organizations: None     Relationship status: None     Intimate partner violence     Fear of current or ex partner: None     Emotionally abused: None     Physically abused: None     Forced sexual activity: None   Other Topics Concern      Service Not Asked     Blood Transfusions Not Asked     Caffeine Concern No     Occupational Exposure Not Asked     Hobby Hazards Not Asked     Sleep Concern Not Asked     Stress Concern Not Asked     Weight Concern Not Asked     Special Diet Not Asked     Back Care Not Asked     Exercise Yes     Bike Helmet Yes     Comment: motorcycle     Seat Belt Yes     Self-Exams Yes     Parent/sibling w/ CABG, MI or angioplasty before 65F 55M? No   Social History Narrative     2004;     Enjoys traveling, motorcycling    5 children, 3 in [facility] area.       Review of Systems:  Skin:  Negative     Eyes:  Positive for glaucoma;glasses  ENT:  Positive for hearing loss  Respiratory:  Positive for dyspnea on exertion  Cardiovascular:    lightheadedness;Positive for;palpitations;chest pain  Gastroenterology: Negative    Genitourinary:  Negative    Musculoskeletal:  Negative    Neurologic:  Positive for numbness or tingling of feet  Psychiatric:  Positive for excessive stress;anxiety;depression  Heme/Lymph/Imm:  Negative    Endocrine:  Negative      Physical Exam:  Vitals: BP (!) 144/88   Pulse 86   Ht 1.613 m (5' 3.5\")   Wt 54 kg (119 lb)   BMI 20.75 kg/m  "     Recent Lab Results:  LIPID RESULTS:  Lab Results   Component Value Date    CHOL 155 02/20/2020    HDL 59 02/20/2020    LDL 85 02/20/2020    TRIG 57 02/20/2020    CHOLHDLRATIO 2.6 02/09/2015       LIVER ENZYME RESULTS:  Lab Results   Component Value Date    AST 15 12/23/2019    ALT 18 12/23/2019       CBC RESULTS:  Lab Results   Component Value Date    WBC 6.1 11/29/2020    RBC 3.75 (L) 11/29/2020    HGB 12.3 11/29/2020    HCT 37.6 11/29/2020     11/29/2020    MCH 32.8 11/29/2020    MCHC 32.7 11/29/2020    RDW 11.9 11/29/2020     11/29/2020       BMP RESULTS:  Lab Results   Component Value Date     11/29/2020    POTASSIUM 3.8 11/29/2020    CHLORIDE 106 11/29/2020    CO2 27 11/29/2020    ANIONGAP 5 11/29/2020     (H) 11/29/2020    BUN 12 11/29/2020    CR 0.56 11/29/2020    GFRESTIMATED 85 11/29/2020    GFRESTBLACK >90 11/29/2020    MABEL 8.6 11/29/2020        A1C RESULTS:  Lab Results   Component Value Date    A1C 5.3 05/10/2012       INR RESULTS:  Lab Results   Component Value Date    INR 1.6 (A) 07/06/2012    INR 2.1 (A) 06/27/2012    INR 0.98 05/12/2012    INR 0.96 05/10/2012           CC  No referring provider defined for this encounter.

## 2020-12-02 NOTE — LETTER
12/2/2020      Carisa Waggoner MD  [address] [facility] [address]  [place] [address]      RE: Jennifer Castorenadylanpipo       Dear Colleague,    I had the pleasure of seeing Jennifer Whiteside in the [facility].    Service Date: 12/02/2020      REASON FOR VISIT:  Paroxysmal atrial fibrillation.      HISTORY OF PRESENT ILLNESS:  Jennifer is a very pleasant 85-year-old female who is seeing me after an ED visit.  She has a history of paroxysmal atrial fibrillation that was diagnosed this year on a Zio Patch.  She subsequently had been on metoprolol and anticoagulation with Eliquis 2.5 b.i.d.  She has a history of hypertension because of which her metoprolol has been switched to diltiazem 240, but then she started having lightheadedness and then eventually was cut down to 120.  In any case, she was last seen by me and was relatively doing okay on 11/23/2020.  However, a week after that, she started feeling significantly lightheaded, short of breath, dizzy and went to the emergency department.  She was noted to be in sinus rhythm, and subsequently, she was switched off from diltiazem onto metoprolol 25 b.i.d. again and has been feeling just fine.  She was set up for a visit today because she was in the ED for those complaints last week.  Jennifer is feeling fine today.  She overall has no cardiac symptoms.      PHYSICAL EXAMINATION:   VITAL SIGNS:  Blood pressure is 144/88 with a pulse of 86.   GENERAL:  Alert and oriented x3, in no acute distress.   NECK:  Supple.  JVP is normal.   LUNGS:  Clear.  Nonlabored.   EXTREMITIES:  No edema.   PSYCHIATRIC:  Appropriate affect.   ABDOMEN:  Nondistended.      ASSESSMENT AND PLAN:  Keiko is 85 with a history of paroxysmal atrial fibrillation.  At this point, she does not seem to like diltiazem very well.  It is causing her to have dizziness SOB despite normal BP during those times.  As such, I will continue metoprolol 25 b.i.d.  She has an  appointment with Teri Alfred on 2020.  I have told her to write down her blood pressure readings, and if more than 140/90, we could probably increase metoprolol to 37.5 twice a day or perhaps add low-dose of hydrochlorothiazide or chlorthalidone to her regimen.  I do not think she tolerates calcium channel blockers very well so I will probably avoid amlodipine.  Otherwise, I will see her back in a year for followup, sooner if anything changes.         LUCIUS TRUONG MD             D: 2020   T: 2020   MT: bashir      Name:     LUCY PERRY   MRN:      [id number]        Account:      [id number]   :      1935           Service Date: 2020      Document: [id number]            Outpatient Encounter Medications as of 2020   Medication Sig Dispense Refill     alendronate 70 MG PO tablet 1 tablet weekly with 8 oz water. 12 tablet 3     apixaban ANTICOAGULANT (ELIQUIS) 2.5 MG tablet Take 1 tablet (2.5 mg) by mouth 2 times daily 180 tablet 1     GARLIC PO Take 2 tablets by mouth daily       LATANOPROST OP        metoprolol tartrate (LOPRESSOR) 25 MG tablet Take 1 tablet (25 mg) by mouth 2 times daily 180 tablet 3     Multiple Vitamin (MULTI-VITAMIN) per tablet Take  by mouth. Equaline MVI - 1 daily 100 tablet 3     Omega-3 Fatty Acids (FISH OIL) 1200 MG capsule Take 1 capsule by mouth 2 times daily.       pravastatin 40 MG PO tablet Take 1 tablet (40 mg) by mouth daily 90 tablet 3     sertraline (ZOLOFT) 50 MG tablet TAKE ONE TABLET BY MOUTH ONE TIME DAILY 90 tablet 0     Vitamin D, Cholecalciferol, 25 MCG (1000 UT) TABS        No facility-administered encounter medications on file as of 2020.        Again, thank you for allowing me to participate in the care of your patient.      Sincerely,    Lucius Truong MD     [facility]

## 2021-01-04 NOTE — PROGRESS NOTES
"TELEPHONE VISIT    Jennifer Whiteside is a 85 year old female who is being evaluated via a billable telephone visit.      The patient has been notified of following:     \"This telephone visit will be conducted via a call between you and your physician/provider. Given concern for spread of COVID 19 we are minimizing in person clinic visits when possible. We have found that certain health care needs can be provided without the need for a physical exam.  This service lets us provide the care you need with a short phone conversation.  If a prescription is necessary we can send it directly to your pharmacy.  If lab work is needed we can place an order for that and you can then stop by our lab to have the test done at a later time. If during the course of the call the physician/provider feels a telephone visit is not appropriate, you will not be charged for this service.\"       I have reviewed and updated the patient's Past Medical History, Social History, Family History and Medication List.    MEDICATIONS:  Current Outpatient Medications   Medication Sig Dispense Refill     alendronate 70 MG PO tablet 1 tablet weekly with 8 oz water. 12 tablet 3     apixaban ANTICOAGULANT (ELIQUIS) 2.5 MG tablet Take 1 tablet (2.5 mg) by mouth 2 times daily 180 tablet 1     GARLIC PO Take 2 tablets by mouth daily       LATANOPROST OP        metoprolol tartrate (LOPRESSOR) 25 MG tablet Take 1 tablet (25 mg) by mouth 2 times daily 180 tablet 3     Multiple Vitamin (MULTI-VITAMIN) per tablet Take  by mouth. Equaline MVI - 1 daily 100 tablet 3     Omega-3 Fatty Acids (FISH OIL) 1200 MG capsule Take 1 capsule by mouth 2 times daily.       pravastatin 40 MG PO tablet Take 1 tablet (40 mg) by mouth daily 90 tablet 3     sertraline (ZOLOFT) 50 MG tablet TAKE ONE TABLET BY MOUTH ONE TIME DAILY 90 tablet 0     Vitamin D, Cholecalciferol, 25 MCG (1000 UT) TABS          ALLERGIES  Patient has no known allergies.    Review Of Systems  Skin: " negative  Eyes: negative  Ears/Nose/Throat: hearing loss  Respiratory: No shortness of breath, dyspnea on exertion, cough, or hemoptysis  Cardiovascular: a-fib on and off  Gastrointestinal: negative  Genitourinary: negative  Musculoskeletal: negative  Neurologic: negative  Psychiatric: depression stable  Hematologic/Lymphatic/Immunologic: negative  Endocrine: negative  (ROS TAKEN PRIOR TO VISIT)    Patient reported vitals:  BP:104/65  Heart rate:72  Weight:122.4  (stable)    [facility]    Patient agreeable and consented for telephone visit:  Yes    HISTORY OF PRESENT ILLNESS  This is a 85 year old female who follows with Dr. Truong at [facility].  He is seen in our clinic for paroxysmal atrial fibrillation, HTN, hyperlipidemia, and peripheral vascular disease.    Mr Whiteside was first found to have paroxysmal atrial fibrillation following her left carotid endarterectomy in 2012.  Her ECHO showed LVEF 60-65%, normal RV function, RVSP 25 mmHg, and no significant valvular pathology.  She did well for a number of years without reoccurrence     She redeveloped symptomatic paroxysmal atrial fibrillation (1/2020), when noted on a event monitor and has been on Eliquis since.      A Karolina NUC (5/2020) showed no evidence of ischemia/infarction  LVEF 70%  Her ECHO showed LVEF 60-65%, 1+ tricuspid regurgitation, mild mitral regurgitation, grade I diastolic dysfunction, normal RV function and RVSP 26 mmHg      Her Metoprolol was changed to Diltiazem a few months ago, but she developed some lightheadedness despite normal BP readings.  Therefore, Dr. Truong changed her back to Metoprolol    Our visit today is for further review.      Mr Whiteside is fairly sedentary.  She tries to walk the alexander daily.  She checks her BP at home and notes some fluctuations in her blood pressure.  For the majority of the day her SBP is staying < 120 mmHg  At times, her SBP can be 140 mmHg prior to am meds.  She denies any  significant chest pain or shortness of breath.  She states that she feels much better on the Metoprolol and has had less palpitations.  She only feels brief palpitations about every two weeks.  She has not had any further lightheadedness.        ASSESSMENT AND PLAN    Symptomatic Paroxysmal Atrial Fibrillation  -initially noted in 2012 with reoccurrence (1/2020)  -on Metoprolol  -denies any recent significant palpitations  -chronic Eliquis 2.5 mg BID    HTN:  -on Metoprolol 25 mg BID only  -BP controlled at home  -she will notify our clinic with SBP > 140 mmHg on a regular basis.    Hyperlipidemia:  -on Pravastatin 40 mg  -LDL 85 (2/2020)      Thank you for allowing me to participate in her care.  She will return for follow up with Dr. Truong as planned in December 2021.      I have reviewed the note as documented above.  This accurately captures the substance of my conversation with the patient.      Phone call contact time  Call Started at 13:04 pm  Call Ended at 13:18 pm    PAVITHRA Guevara, CNP

## 2021-01-06 ENCOUNTER — VIRTUAL VISIT (OUTPATIENT)
Dept: CARDIOLOGY | Facility: CLINIC | Age: 86
End: 2021-01-06
Attending: INTERNAL MEDICINE
Payer: COMMERCIAL

## 2021-01-06 DIAGNOSIS — I48.0 PAROXYSMAL ATRIAL FIBRILLATION (H): ICD-10-CM

## 2021-01-06 DIAGNOSIS — I10 ESSENTIAL HYPERTENSION: Primary | ICD-10-CM

## 2021-01-06 PROCEDURE — 99213 OFFICE O/P EST LOW 20 MIN: CPT | Mod: 95 | Performed by: NURSE PRACTITIONER

## 2021-01-06 NOTE — LETTER
"1/6/2021    Carisa Waggoner MD  [address] [facility] [address]  [facility] [address]    RE: Jennifer Whiteside       Dear Colleague,    I had the pleasure of seeing Jennifer Whiteside in the [facility].    TELEPHONE VISIT    Jennifer Whiteside is a 85 year old female who is being evaluated via a billable telephone visit.      The patient has been notified of following:     \"This telephone visit will be conducted via a call between you and your physician/provider. Given concern for spread of COVID 19 we are minimizing in person clinic visits when possible. We have found that certain health care needs can be provided without the need for a physical exam.  This service lets us provide the care you need with a short phone conversation.  If a prescription is necessary we can send it directly to your pharmacy.  If lab work is needed we can place an order for that and you can then stop by our lab to have the test done at a later time. If during the course of the call the physician/provider feels a telephone visit is not appropriate, you will not be charged for this service.\"       I have reviewed and updated the patient's Past Medical History, Social History, Family History and Medication List.    MEDICATIONS:  Current Outpatient Medications   Medication Sig Dispense Refill     alendronate 70 MG PO tablet 1 tablet weekly with 8 oz water. 12 tablet 3     apixaban ANTICOAGULANT (ELIQUIS) 2.5 MG tablet Take 1 tablet (2.5 mg) by mouth 2 times daily 180 tablet 1     GARLIC PO Take 2 tablets by mouth daily       LATANOPROST OP        metoprolol tartrate (LOPRESSOR) 25 MG tablet Take 1 tablet (25 mg) by mouth 2 times daily 180 tablet 3     Multiple Vitamin (MULTI-VITAMIN) per tablet Take  by mouth. Equaline MVI - 1 daily 100 tablet 3     Omega-3 Fatty Acids (FISH OIL) 1200 MG capsule Take 1 capsule by mouth 2 times daily.       pravastatin 40 MG PO tablet Take 1 tablet (40 mg) by mouth " daily 90 tablet 3     sertraline (ZOLOFT) 50 MG tablet TAKE ONE TABLET BY MOUTH ONE TIME DAILY 90 tablet 0     Vitamin D, Cholecalciferol, 25 MCG (1000 UT) TABS          ALLERGIES  Patient has no known allergies.    Review Of Systems  Skin: negative  Eyes: negative  Ears/Nose/Throat: hearing loss  Respiratory: No shortness of breath, dyspnea on exertion, cough, or hemoptysis  Cardiovascular: a-fib on and off  Gastrointestinal: negative  Genitourinary: negative  Musculoskeletal: negative  Neurologic: negative  Psychiatric: depression stable  Hematologic/Lymphatic/Immunologic: negative  Endocrine: negative  (ROS TAKEN PRIOR TO VISIT)    Patient reported vitals:  BP:104/65  Heart rate:72  Weight:122.4  (stable)    [facility]    Patient agreeable and consented for telephone visit:  Yes    HISTORY OF PRESENT ILLNESS  This is a 85 year old female who follows with Dr. Truong at [facility].  He is seen in our clinic for paroxysmal atrial fibrillation, HTN, hyperlipidemia, and peripheral vascular disease.    Mr Whiteside was first found to have paroxysmal atrial fibrillation following her left carotid endarterectomy in 2012.  Her ECHO showed LVEF 60-65%, normal RV function, RVSP 25 mmHg, and no significant valvular pathology.  She did well for a number of years without reoccurrence     She redeveloped symptomatic paroxysmal atrial fibrillation (1/2020), when noted on a event monitor and has been on Eliquis since.      A Karolina NUC (5/2020) showed no evidence of ischemia/infarction  LVEF 70%  Her ECHO showed LVEF 60-65%, 1+ tricuspid regurgitation, mild mitral regurgitation, grade I diastolic dysfunction, normal RV function and RVSP 26 mmHg      Her Metoprolol was changed to Diltiazem a few months ago, but she developed some lightheadedness despite normal BP readings.  Therefore, Dr. Truong changed her back to Metoprolol    Our visit today is for further review.      Mr Whiteside is fairly sedentary.  She tries  to walk the alexander daily.  She checks her BP at home and notes some fluctuations in her blood pressure.  For the majority of the day her SBP is staying < 120 mmHg  At times, her SBP can be 140 mmHg prior to am meds.  She denies any significant chest pain or shortness of breath.  She states that she feels much better on the Metoprolol and has had less palpitations.  She only feels brief palpitations about every two weeks.  She has not had any further lightheadedness.        ASSESSMENT AND PLAN    Symptomatic Paroxysmal Atrial Fibrillation  -initially noted in 2012 with reoccurrence (1/2020)  -on Metoprolol  -denies any recent significant palpitations  -chronic Eliquis 2.5 mg BID    HTN:  -on Metoprolol 25 mg BID only  -BP controlled at home  -she will notify our clinic with SBP > 140 mmHg on a regular basis.    Hyperlipidemia:  -on Pravastatin 40 mg  -LDL 85 (2/2020)      Thank you for allowing me to participate in her care.  She will return for follow up with Dr. Truong as planned in December 2021.      I have reviewed the note as documented above.  This accurately captures the substance of my conversation with the patient.      Phone call contact time  Call Started at 13:04 pm  Call Ended at 13:18 pm      Thank you for allowing me to participate in the care of your patient.    Sincerely,     PAVITHRA Ruby [facility]

## 2021-01-28 DIAGNOSIS — M85.80 OSTEOPENIA, UNSPECIFIED LOCATION: ICD-10-CM

## 2021-01-29 RX ORDER — ALENDRONATE SODIUM 70 MG/1
TABLET ORAL
Qty: 12 TABLET | Refills: 2 | Status: SHIPPED | OUTPATIENT
Start: 2021-01-29 | End: 2021-09-23

## 2021-01-29 NOTE — TELEPHONE ENCOUNTER
Pending Prescriptions:                       Disp   Refills    alendronate (FOSAMAX) 70 MG tablet [Pharm*12 tab*2            Sig: Take one tablet by mouth once a week with 8           ounces water.    Prescription approved per [facility] Refill Protocol.

## 2021-02-10 DIAGNOSIS — F41.9 ANXIETY: ICD-10-CM

## 2021-02-11 NOTE — TELEPHONE ENCOUNTER
Pending Prescriptions:                       Disp   Refills    sertraline (ZOLOFT) 50 MG tablet [Pharmac*90 tab*1            Sig: TAKE ONE TABLET BY MOUTH ONE TIME DAILY    Prescription approved per [facility] Refill Protocol.

## 2021-02-18 DIAGNOSIS — R00.0 TACHYCARDIA: ICD-10-CM

## 2021-02-18 DIAGNOSIS — I47.10 SVT (SUPRAVENTRICULAR TACHYCARDIA) (H): ICD-10-CM

## 2021-02-18 DIAGNOSIS — I48.0 PAROXYSMAL ATRIAL FIBRILLATION (H): ICD-10-CM

## 2021-02-18 RX ORDER — METOPROLOL TARTRATE 25 MG/1
25 TABLET, FILM COATED ORAL 2 TIMES DAILY
Qty: 180 TABLET | Refills: 2 | Status: SHIPPED | OUTPATIENT
Start: 2021-02-18 | End: 2021-11-10

## 2021-03-30 DIAGNOSIS — E78.5 HYPERLIPIDEMIA LDL GOAL <70: ICD-10-CM

## 2021-03-31 NOTE — TELEPHONE ENCOUNTER
Pending Prescriptions:                       Disp   Refills    pravastatin (PRAVACHOL) 40 MG tablet [Phar*90 tab*0        Sig: Take one tablet (40 mg) by mouth daily.    Routing refill request to provider for review/approval because:  LDL Cholesterol Calculated   Date Value Ref Range Status   02/20/2020 85 <100 mg/dL Final     Comment:     Desirable:       <100 mg/dl

## 2021-04-01 NOTE — TELEPHONE ENCOUNTER
Patient needs an appointment for follow-up.  Please schedule that and then I will do the refill.  They need to do labs at that appointment.

## 2021-04-07 RX ORDER — PRAVASTATIN SODIUM 40 MG
TABLET ORAL
Qty: 30 TABLET | Refills: 0 | Status: SHIPPED | OUTPATIENT
Start: 2021-04-07 | End: 2021-04-23

## 2021-04-07 NOTE — TELEPHONE ENCOUNTER
Dr. Waggoner, patient notified of your message. Patient scheduled a follow up appointment with you on 4/23/21. She will be fasting for this appointment for labs. She said she will be out of her med prior to seeing you and need enough refill till then.

## 2021-04-10 ENCOUNTER — HEALTH MAINTENANCE LETTER (OUTPATIENT)
Age: 86
End: 2021-04-10

## 2021-04-23 ENCOUNTER — OFFICE VISIT (OUTPATIENT)
Dept: INTERNAL MEDICINE | Facility: CLINIC | Age: 86
End: 2021-04-23
Payer: COMMERCIAL

## 2021-04-23 DIAGNOSIS — Z00.00 ENCOUNTER FOR ANNUAL WELLNESS EXAM IN MEDICARE PATIENT: Primary | ICD-10-CM

## 2021-04-23 DIAGNOSIS — I10 BENIGN ESSENTIAL HYPERTENSION: ICD-10-CM

## 2021-04-23 DIAGNOSIS — I47.10 SVT (SUPRAVENTRICULAR TACHYCARDIA) (H): ICD-10-CM

## 2021-04-23 DIAGNOSIS — R53.83 FATIGUE, UNSPECIFIED TYPE: ICD-10-CM

## 2021-04-23 DIAGNOSIS — I77.9 BILATERAL CAROTID ARTERY DISEASE, UNSPECIFIED TYPE (H): ICD-10-CM

## 2021-04-23 DIAGNOSIS — E78.5 HYPERLIPIDEMIA LDL GOAL <70: ICD-10-CM

## 2021-04-23 DIAGNOSIS — M85.89 OSTEOPENIA OF MULTIPLE SITES: ICD-10-CM

## 2021-04-23 DIAGNOSIS — I48.0 PAROXYSMAL ATRIAL FIBRILLATION (H): ICD-10-CM

## 2021-04-23 LAB
ANION GAP SERPL CALCULATED.3IONS-SCNC: 4 MMOL/L (ref 3–14)
BUN SERPL-MCNC: 11 MG/DL (ref 7–30)
CALCIUM SERPL-MCNC: 9.1 MG/DL (ref 8.5–10.1)
CHLORIDE SERPL-SCNC: 98 MMOL/L (ref 94–109)
CHOLEST SERPL-MCNC: 161 MG/DL
CO2 SERPL-SCNC: 29 MMOL/L (ref 20–32)
CREAT SERPL-MCNC: 0.68 MG/DL (ref 0.52–1.04)
GFR SERPL CREATININE-BSD FRML MDRD: 80 ML/MIN/{1.73_M2}
GLUCOSE SERPL-MCNC: 95 MG/DL (ref 70–99)
HDLC SERPL-MCNC: 73 MG/DL
HGB BLD-MCNC: 12.5 G/DL (ref 11.7–15.7)
LDLC SERPL CALC-MCNC: 76 MG/DL
NONHDLC SERPL-MCNC: 88 MG/DL
POTASSIUM SERPL-SCNC: 4.3 MMOL/L (ref 3.4–5.3)
SODIUM SERPL-SCNC: 131 MMOL/L (ref 133–144)
TRIGL SERPL-MCNC: 60 MG/DL
TSH SERPL DL<=0.005 MIU/L-ACNC: 2.71 MU/L (ref 0.4–4)

## 2021-04-23 PROCEDURE — 99397 PER PM REEVAL EST PAT 65+ YR: CPT | Mod: 25 | Performed by: INTERNAL MEDICINE

## 2021-04-23 PROCEDURE — 84443 ASSAY THYROID STIM HORMONE: CPT | Performed by: INTERNAL MEDICINE

## 2021-04-23 PROCEDURE — 99214 OFFICE O/P EST MOD 30 MIN: CPT | Mod: 25 | Performed by: INTERNAL MEDICINE

## 2021-04-23 PROCEDURE — 90714 TD VACC NO PRESV 7 YRS+ IM: CPT | Performed by: INTERNAL MEDICINE

## 2021-04-23 PROCEDURE — 80061 LIPID PANEL: CPT | Performed by: INTERNAL MEDICINE

## 2021-04-23 PROCEDURE — 90471 IMMUNIZATION ADMIN: CPT | Performed by: INTERNAL MEDICINE

## 2021-04-23 PROCEDURE — 80048 BASIC METABOLIC PNL TOTAL CA: CPT | Performed by: INTERNAL MEDICINE

## 2021-04-23 PROCEDURE — 36415 COLL VENOUS BLD VENIPUNCTURE: CPT | Performed by: INTERNAL MEDICINE

## 2021-04-23 PROCEDURE — 85018 HEMOGLOBIN: CPT | Performed by: INTERNAL MEDICINE

## 2021-04-23 RX ORDER — PRAVASTATIN SODIUM 40 MG
40 TABLET ORAL DAILY
Qty: 90 TABLET | Refills: 3 | Status: SHIPPED | OUTPATIENT
Start: 2021-04-23 | End: 2022-04-22

## 2021-04-23 ASSESSMENT — ANXIETY QUESTIONNAIRES
1. FEELING NERVOUS, ANXIOUS, OR ON EDGE: NOT AT ALL
GAD7 TOTAL SCORE: 1
2. NOT BEING ABLE TO STOP OR CONTROL WORRYING: NOT AT ALL
7. FEELING AFRAID AS IF SOMETHING AWFUL MIGHT HAPPEN: NOT AT ALL
6. BECOMING EASILY ANNOYED OR IRRITABLE: NOT AT ALL
5. BEING SO RESTLESS THAT IT IS HARD TO SIT STILL: NOT AT ALL
IF YOU CHECKED OFF ANY PROBLEMS ON THIS QUESTIONNAIRE, HOW DIFFICULT HAVE THESE PROBLEMS MADE IT FOR YOU TO DO YOUR WORK, TAKE CARE OF THINGS AT HOME, OR GET ALONG WITH OTHER PEOPLE: NOT DIFFICULT AT ALL
3. WORRYING TOO MUCH ABOUT DIFFERENT THINGS: SEVERAL DAYS

## 2021-04-23 ASSESSMENT — MIFFLIN-ST. JEOR: SCORE: 982.03

## 2021-04-23 ASSESSMENT — ACTIVITIES OF DAILY LIVING (ADL): CURRENT_FUNCTION: NO ASSISTANCE NEEDED

## 2021-04-23 ASSESSMENT — PATIENT HEALTH QUESTIONNAIRE - PHQ9: 5. POOR APPETITE OR OVEREATING: NOT AT ALL

## 2021-04-23 NOTE — PROGRESS NOTES
"SUBJECTIVE:   Jennifer Whiteside is a 86 year old female who presents for Preventive Visit.      Patient has been advised of split billing requirements and indicates understanding: Yes   Are you in the first 12 months of your Medicare coverage?  No    Healthy Habits:    In general, how would you rate your overall health?  Very good    Frequency of exercise:  2-3 days/week    Duration of exercise:  30-45 minutes    Do you usually eat at least 4 servings of fruit and vegetables a day, include whole grains    & fiber and avoid regularly eating high fat or \"junk\" foods?  Yes    Taking medications regularly:  Yes    Barriers to taking medications:  None    Medication side effects:  None    Ability to successfully perform activities of daily living:  No assistance needed    Hearing Impairment:  No hearing concerns    In the past 6 months, have you been bothered by leaking of urine?  No    In general, how would you rate your overall mental or emotional health?  Very good      PHQ-2 Total Score:    Additional concerns today:  No    Do you feel safe in your environment? Yes    Have you ever done Advance Care Planning? (For example, a Health Directive, POLST, or a discussion with a medical provider or your loved ones about your wishes): Yes, advance care planning is on file.       Fall risk  Fallen 2 or more times in the past year?: No  Any fall with injury in the past year?: No    Cognitive Screening   1) Repeat 3 items (Leader, Season, Table)    2) Clock draw: NORMAL  3) 3 item recall: Recalls 1 object   Results: NORMAL clock, 1-2 items recalled: COGNITIVE IMPAIRMENT LESS LIKELY    Mini-CogTM Copyright GRAZYNA Parra. Licensed by the author for use in [facility]; reprinted with permission (justino@.[facility] [place]). All rights reserved.      Do you have sleep apnea, excessive snoring or daytime drowsiness?: Unknown    Reviewed and updated as needed this visit by clinical staff                Problems:   1. Afib, SVT: no " symptoms    2. Hyperlipidemia: stable on med    3. Osteopenia: stable on med    4. Carotid stenosis: no acute neurologic symptoms.     5. HTN: BP occasionally higher, range 112-160/68-87. Most are under 150/90    Current concerns:    She has been more tired. She is having to get up to the bathroom 3-4 times at night. She is not falling asleep during the day. She drinks a lot of water. She also thinks she awakens sometimes due to pain from lying on back.     Patient Active Problem List   Diagnosis     Generalized osteoarthrosis, unspecified site     Dyspepsia     Hyperlipidemia LDL goal <70     Advance Care Planning     Paroxysmal atrial fibrillation (H)     Bilateral carotid artery disease, unspecified type (H)     Osteopenia of multiple sites     SVT (supraventricular tachycardia) (H)     Current Outpatient Medications   Medication Sig Dispense Refill     alendronate (FOSAMAX) 70 MG tablet Take one tablet by mouth once a week with 8 ounces water. 12 tablet 2     apixaban ANTICOAGULANT (ELIQUIS) 2.5 MG tablet Take 1 tablet (2.5 mg) by mouth 2 times daily 180 tablet 2     GARLIC PO Take 2 tablets by mouth daily       LATANOPROST OP        metoprolol tartrate (LOPRESSOR) 25 MG tablet Take 1 tablet (25 mg) by mouth 2 times daily 180 tablet 2     Multiple Vitamin (MULTI-VITAMIN) per tablet Take  by mouth. Equaline MVI - 1 daily 100 tablet 3     Omega-3 Fatty Acids (FISH OIL) 1200 MG capsule Take 1 capsule by mouth 2 times daily.       pravastatin (PRAVACHOL) 40 MG tablet Take one tablet (40 mg) by mouth daily. 30 tablet 0     sertraline (ZOLOFT) 50 MG tablet TAKE ONE TABLET BY MOUTH ONE TIME DAILY 90 tablet 1     Vitamin D, Cholecalciferol, 25 MCG (1000 UT) TABS         Reviewed and updated as needed this visit by Provider                Social History     Tobacco Use     Smoking status: Former Smoker     Packs/day: 0.50     Years: 3.00     Pack years: 1.50     Types: Cigarettes     Smokeless tobacco: Never Used      "Tobacco comment: quit 1958   Substance Use Topics     Alcohol use: Yes     Comment: occ wine     If you drink alcohol do you typically have >3 drinks per day or >7 drinks per week? No      Current providers sharing in care for this patient include:   Patient Care Team:  Carisa Waggoner MD as PCP - General (Internal Medicine)  Carisa Waggoner MD as Assigned PCP  Lucius Truong MD as Assigned Heart and Vascular Provider  Phill Wallace MD as Assigned Sleep Provider    The following health maintenance items are reviewed in Epic and correct as of today:  Health Maintenance Due   Topic Date Due     ANNUAL REVIEW OF HM ORDERS  Never done     LEE ASSESSMENT  09/03/2020     PHQ-2  01/01/2021     MEDICARE ANNUAL WELLNESS VISIT  02/20/2021     FALL RISK ASSESSMENT  02/20/2021     DTAP/TDAP/TD IMMUNIZATION (3 - Td) 05/05/2021       Pertinent mammograms are reviewed under the imaging tab.    Review of Systems  CONSTITUTIONAL:some fatigue, no fever, chills, weight loss  INTEGUMENTARY/SKIN: NEGATIVE for worrisome rashes, moles or lesions  EYES: NEGATIVE for vision changes or irritation  ENT/MOUTH: NEGATIVE for ear, mouth and throat problems  RESP: NEGATIVE for significant cough or SOB  BREAST: NEGATIVE for masses, tenderness or discharge  CV: NEGATIVE for chest pain, palpitations or peripheral edema  GI: NEGATIVE for nausea, abdominal pain, heartburn, or change in bowel habits  : NEGATIVE for frequency, dysuria, or hematuria  MUSCULOSKELETAL: NEGATIVE for significant arthralgias or myalgia  NEURO: NEGATIVE for weakness, dizziness or paresthesias  ENDOCRINE: NEGATIVE for temperature intolerance, skin/hair changes  HEME: NEGATIVE for bleeding problems  PSYCHIATRIC: NEGATIVE for changes in mood or affect    OBJECTIVE:   BP (!) 148/86 (BP Location: Right arm, Patient Position: Sitting, Cuff Size: Adult Regular)   Pulse 81   Temp 98.3  F (36.8  C) (Oral)   Resp 18   Ht 1.626 m (5' 4\")   Wt 55.2 kg (121 lb 11.2 oz)   SpO2 98%   BMI " "20.89 kg/m   Estimated body mass index is 20.75 kg/m  as calculated from the following:    Height as of 12/2/20: 1.613 m (5' 3.5\").    Weight as of 12/2/20: 54 kg (119 lb).  Physical Exam      HEENT: extraocular movements are intact, pupils equal and reactive to light and accommodation, TMs clear  NECK: Neck supple. No adenopathy. Thyroid symmetric, normal size,, Carotids with soft bruits  PULMONARY: clear to auscultation  CARDIAC: regular rate and rhythm and no murmurs, clicks, or gallops  PULSES: 2/2 throughout  BACK: no spinal or CVAT  ABDOMINAL: Soft, nontender.  Normal bowel sounds.  No hepatosplenomegaly or abnormal masses          ASSESSMENT / PLAN:   1. Encounter for annual wellness exam in Medicare patient  Up to date    2. Paroxysmal atrial fibrillation (H)  stable    3. SVT (supraventricular tachycardia) (H)  stable  - Hemoglobin  - TSH with free T4 reflex    4. Bilateral carotid artery disease, unspecified type (H)  stable    5. Benign essential hypertension  Most home readings are good, given the lowest value, I would not recommend increasing medication to avoid causing too many low readings. Continue to monitor and call if most of the BP readings are over 150.   - Basic metabolic panel  (Ca, Cl, CO2, Creat, Gluc, K, Na, BUN)    6. Hyperlipidemia LDL goal <70  recheck  - Lipid panel reflex to direct LDL Fasting  - pravastatin (PRAVACHOL) 40 MG tablet; Take 1 tablet (40 mg) by mouth daily  Dispense: 90 tablet; Refill: 3    7. Osteopenia of multiple sites  stable    8. Fatigue, unspecified type  Likely related to getting up a lot at night. Lower fluid intake, try to use a pillow to avoid turning onto back at night.   - Hemoglobin  - TSH with free T4 reflex    Patient has been advised of split billing requirements and indicates understanding: Yes  COUNSELING:  Reviewed preventive health counseling, as reflected in patient instructions    Estimated body mass index is 20.75 kg/m  as calculated from the " "following:    Height as of 12/2/20: 1.613 m (5' 3.5\").    Weight as of 12/2/20: 54 kg (119 lb).        She reports that she has quit smoking. Her smoking use included cigarettes. She has a 1.50 pack-year smoking history. She has never used smokeless tobacco.      Appropriate preventive services were discussed with this patient, including applicable screening as appropriate for cardiovascular disease, diabetes, osteopenia/osteoporosis, and glaucoma.  As appropriate for age/gender, discussed screening for colorectal cancer, prostate cancer, breast cancer, and cervical cancer. Checklist reviewing preventive services available has been given to the patient.    Reviewed patients plan of care and provided an AVS. The Basic Care Plan (routine screening as documented in Health Maintenance) for Jennifer meets the Care Plan requirement. This Care Plan has been established and reviewed with the Patient.    Counseling Resources:  ATP IV Guidelines  Pooled Cohorts Equation Calculator  Breast Cancer Risk Calculator  Breast Cancer: Medication to Reduce Risk  FRAX Risk Assessment  ICSI Preventive Guidelines  Dietary Guidelines for Americans, 2010  USDA's MyPlate  ASA Prophylaxis  Lung CA Screening    Carisa Waggoner MD  [facility]    Identified Health Risks:  "

## 2021-04-23 NOTE — NURSING NOTE
"BP (!) 156/86 (BP Location: Right arm, Patient Position: Sitting, Cuff Size: Adult Regular)   Pulse 81   Temp 98.3  F (36.8  C) (Oral)   Resp 18   Ht 1.626 m (5' 4\")   Wt 55.2 kg (121 lb 11.2 oz)   SpO2 98%   BMI 20.89 kg/m    "

## 2021-04-23 NOTE — PATIENT INSTRUCTIONS
Try to use a pillow under your back to help keep you from lying on the back at night.   Decrease fluid intake by 6-8 ounces to see if you can decrease trips to bathroom at night.

## 2021-04-24 ASSESSMENT — ANXIETY QUESTIONNAIRES: GAD7 TOTAL SCORE: 1

## 2021-04-28 VITALS
SYSTOLIC BLOOD PRESSURE: 148 MMHG | OXYGEN SATURATION: 98 % | TEMPERATURE: 98.3 F | HEART RATE: 81 BPM | WEIGHT: 121.7 LBS | RESPIRATION RATE: 18 BRPM | HEIGHT: 64 IN | BODY MASS INDEX: 20.78 KG/M2 | DIASTOLIC BLOOD PRESSURE: 86 MMHG

## 2021-08-05 DIAGNOSIS — F41.9 ANXIETY: ICD-10-CM

## 2021-08-05 NOTE — TELEPHONE ENCOUNTER
Pending Prescriptions:                       Disp   Refills    sertraline (ZOLOFT) 50 MG tablet [Pharmac*90 tab*1            Sig: TAKE ONE TABLET BY MOUTH ONE TIME DAILY    Prescription approved per [facility] Refill Protocol.

## 2021-08-17 ENCOUNTER — HOSPITAL ENCOUNTER (OUTPATIENT)
Facility: CLINIC | Age: 86
Setting detail: OBSERVATION
Discharge: HOME | End: 2021-08-18
Attending: EMERGENCY MEDICINE | Admitting: HOSPITALIST
Payer: COMMERCIAL

## 2021-08-17 ENCOUNTER — APPOINTMENT (OUTPATIENT)
Dept: GENERAL RADIOLOGY | Facility: CLINIC | Age: 86
End: 2021-08-17
Attending: EMERGENCY MEDICINE
Payer: COMMERCIAL

## 2021-08-17 DIAGNOSIS — I48.0 PAROXYSMAL ATRIAL FIBRILLATION (H): ICD-10-CM

## 2021-08-17 DIAGNOSIS — R07.9 CHEST PAIN, UNSPECIFIED TYPE: ICD-10-CM

## 2021-08-17 LAB
ANION GAP SERPL CALCULATED.3IONS-SCNC: 6 MMOL/L (ref 3–14)
ATRIAL RATE - MUSE: 93 BPM
BASOPHILS # BLD AUTO: 0 10E3/UL (ref 0–0.2)
BASOPHILS NFR BLD AUTO: 1 %
BUN SERPL-MCNC: 14 MG/DL (ref 7–30)
CALCIUM SERPL-MCNC: 8.9 MG/DL (ref 8.5–10.1)
CHLORIDE BLD-SCNC: 98 MMOL/L (ref 94–109)
CO2 SERPL-SCNC: 26 MMOL/L (ref 20–32)
CREAT SERPL-MCNC: 0.59 MG/DL (ref 0.52–1.04)
DIASTOLIC BLOOD PRESSURE - MUSE: NORMAL MMHG
EOSINOPHIL # BLD AUTO: 0.3 10E3/UL (ref 0–0.7)
EOSINOPHIL NFR BLD AUTO: 4 %
ERYTHROCYTE [DISTWIDTH] IN BLOOD BY AUTOMATED COUNT: 11.2 % (ref 10–15)
GFR SERPL CREATININE-BSD FRML MDRD: 84 ML/MIN/1.73M2
GLUCOSE BLD-MCNC: 147 MG/DL (ref 70–99)
HCT VFR BLD AUTO: 35.4 % (ref 35–47)
HGB BLD-MCNC: 12 G/DL (ref 11.7–15.7)
IMM GRANULOCYTES # BLD: 0 10E3/UL
IMM GRANULOCYTES NFR BLD: 0 %
INTERPRETATION ECG - MUSE: NORMAL
LYMPHOCYTES # BLD AUTO: 0.9 10E3/UL (ref 0.8–5.3)
LYMPHOCYTES NFR BLD AUTO: 13 %
MAGNESIUM SERPL-MCNC: 2.1 MG/DL (ref 1.6–2.3)
MCH RBC QN AUTO: 32.6 PG (ref 26.5–33)
MCHC RBC AUTO-ENTMCNC: 33.9 G/DL (ref 31.5–36.5)
MCV RBC AUTO: 96 FL (ref 78–100)
MONOCYTES # BLD AUTO: 0.6 10E3/UL (ref 0–1.3)
MONOCYTES NFR BLD AUTO: 9 %
NEUTROPHILS # BLD AUTO: 5.3 10E3/UL (ref 1.6–8.3)
NEUTROPHILS NFR BLD AUTO: 73 %
NRBC # BLD AUTO: 0 10E3/UL
NRBC BLD AUTO-RTO: 0 /100
P AXIS - MUSE: 61 DEGREES
PLATELET # BLD AUTO: 246 10E3/UL (ref 150–450)
POTASSIUM BLD-SCNC: 3.7 MMOL/L (ref 3.4–5.3)
PR INTERVAL - MUSE: 186 MS
QRS DURATION - MUSE: 80 MS
QT - MUSE: 364 MS
QTC - MUSE: 452 MS
R AXIS - MUSE: -5 DEGREES
RBC # BLD AUTO: 3.68 10E6/UL (ref 3.8–5.2)
SARS-COV-2 RNA RESP QL NAA+PROBE: NEGATIVE
SODIUM SERPL-SCNC: 130 MMOL/L (ref 133–144)
SYSTOLIC BLOOD PRESSURE - MUSE: NORMAL MMHG
T AXIS - MUSE: 67 DEGREES
TROPONIN I SERPL-MCNC: <0.015 UG/L (ref 0–0.04)
TSH SERPL DL<=0.005 MIU/L-ACNC: 2.82 MU/L (ref 0.4–4)
VENTRICULAR RATE- MUSE: 93 BPM
WBC # BLD AUTO: 7.2 10E3/UL (ref 4–11)

## 2021-08-17 PROCEDURE — 83735 ASSAY OF MAGNESIUM: CPT | Performed by: EMERGENCY MEDICINE

## 2021-08-17 PROCEDURE — 82374 ASSAY BLOOD CARBON DIOXIDE: CPT | Performed by: EMERGENCY MEDICINE

## 2021-08-17 PROCEDURE — 258N000003 HC RX IP 258 OP 636: Performed by: EMERGENCY MEDICINE

## 2021-08-17 PROCEDURE — 36415 COLL VENOUS BLD VENIPUNCTURE: CPT | Performed by: EMERGENCY MEDICINE

## 2021-08-17 PROCEDURE — 85025 COMPLETE CBC W/AUTO DIFF WBC: CPT | Performed by: EMERGENCY MEDICINE

## 2021-08-17 PROCEDURE — 96360 HYDRATION IV INFUSION INIT: CPT

## 2021-08-17 PROCEDURE — 84484 ASSAY OF TROPONIN QUANT: CPT | Performed by: EMERGENCY MEDICINE

## 2021-08-17 PROCEDURE — 71046 X-RAY EXAM CHEST 2 VIEWS: CPT

## 2021-08-17 PROCEDURE — 84443 ASSAY THYROID STIM HORMONE: CPT | Performed by: EMERGENCY MEDICINE

## 2021-08-17 PROCEDURE — 93005 ELECTROCARDIOGRAM TRACING: CPT

## 2021-08-17 PROCEDURE — C9803 HOPD COVID-19 SPEC COLLECT: HCPCS

## 2021-08-17 PROCEDURE — 99285 EMERGENCY DEPT VISIT HI MDM: CPT | Mod: 25

## 2021-08-17 PROCEDURE — G0378 HOSPITAL OBSERVATION PER HR: HCPCS

## 2021-08-17 PROCEDURE — 87635 SARS-COV-2 COVID-19 AMP PRB: CPT | Performed by: EMERGENCY MEDICINE

## 2021-08-17 PROCEDURE — 99220 PR INITIAL OBSERVATION CARE,LEVEL III: CPT | Performed by: HOSPITALIST

## 2021-08-17 RX ORDER — LATANOPROST 50 UG/ML
1 SOLUTION/ DROPS OPHTHALMIC AT BEDTIME
COMMUNITY

## 2021-08-17 RX ADMIN — SODIUM CHLORIDE 1000 ML: 9 INJECTION, SOLUTION INTRAVENOUS at 18:15

## 2021-08-17 ASSESSMENT — ENCOUNTER SYMPTOMS
SHORTNESS OF BREATH: 1
DIZZINESS: 1
BACK PAIN: 0
FREQUENCY: 0
VOMITING: 0
DIARRHEA: 0
DYSURIA: 0
SORE THROAT: 0
FEVER: 0

## 2021-08-17 ASSESSMENT — MIFFLIN-ST. JEOR: SCORE: 1003.8

## 2021-08-17 NOTE — ED TRIAGE NOTES
Patient was sitting on a chair at her assisted living, had sudden onset of sharp chest pain, chest heaviness and diaphoresis. EMS noted patient was in A fib RVR with heart rate 130-150 initially then improved after 300 ml NS bolus. Patient is on eliquis for A fib last 2 years, reports rarely in A fib.
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
## 2021-08-17 NOTE — ED NOTES
Bed: ED04  Expected date:   Expected time:   Means of arrival:   Comments:  Debra 86F CP, RVR - ETA 4min

## 2021-08-18 ENCOUNTER — APPOINTMENT (OUTPATIENT)
Dept: NUCLEAR MEDICINE | Facility: CLINIC | Age: 86
End: 2021-08-18
Attending: HOSPITALIST
Payer: COMMERCIAL

## 2021-08-18 VITALS
OXYGEN SATURATION: 98 % | TEMPERATURE: 98 F | HEIGHT: 65 IN | SYSTOLIC BLOOD PRESSURE: 169 MMHG | RESPIRATION RATE: 16 BRPM | BODY MASS INDEX: 20.49 KG/M2 | HEART RATE: 85 BPM | DIASTOLIC BLOOD PRESSURE: 87 MMHG | WEIGHT: 123 LBS

## 2021-08-18 LAB
ALBUMIN SERPL-MCNC: 3.5 G/DL (ref 3.4–5)
ALP SERPL-CCNC: 55 U/L (ref 40–150)
ALT SERPL W P-5'-P-CCNC: 18 U/L (ref 0–50)
ANION GAP SERPL CALCULATED.3IONS-SCNC: 4 MMOL/L (ref 3–14)
AST SERPL W P-5'-P-CCNC: 10 U/L (ref 0–45)
BASOPHILS # BLD AUTO: 0 10E3/UL (ref 0–0.2)
BASOPHILS NFR BLD AUTO: 1 %
BILIRUB SERPL-MCNC: 0.7 MG/DL (ref 0.2–1.3)
BUN SERPL-MCNC: 10 MG/DL (ref 7–30)
CALCIUM SERPL-MCNC: 8.9 MG/DL (ref 8.5–10.1)
CHLORIDE BLD-SCNC: 103 MMOL/L (ref 94–109)
CO2 SERPL-SCNC: 28 MMOL/L (ref 20–32)
CREAT SERPL-MCNC: 0.6 MG/DL (ref 0.52–1.04)
CV STRESS MAX HR HE: 98
EOSINOPHIL # BLD AUTO: 0.2 10E3/UL (ref 0–0.7)
EOSINOPHIL NFR BLD AUTO: 3 %
ERYTHROCYTE [DISTWIDTH] IN BLOOD BY AUTOMATED COUNT: 11.4 % (ref 10–15)
GFR SERPL CREATININE-BSD FRML MDRD: 83 ML/MIN/1.73M2
GLUCOSE BLD-MCNC: 107 MG/DL (ref 70–99)
HCT VFR BLD AUTO: 35.7 % (ref 35–47)
HGB BLD-MCNC: 11.8 G/DL (ref 11.7–15.7)
IMM GRANULOCYTES # BLD: 0 10E3/UL
IMM GRANULOCYTES NFR BLD: 0 %
LYMPHOCYTES # BLD AUTO: 1 10E3/UL (ref 0.8–5.3)
LYMPHOCYTES NFR BLD AUTO: 17 %
MCH RBC QN AUTO: 32.3 PG (ref 26.5–33)
MCHC RBC AUTO-ENTMCNC: 33.1 G/DL (ref 31.5–36.5)
MCV RBC AUTO: 98 FL (ref 78–100)
MONOCYTES # BLD AUTO: 0.6 10E3/UL (ref 0–1.3)
MONOCYTES NFR BLD AUTO: 10 %
NEUTROPHILS # BLD AUTO: 4.1 10E3/UL (ref 1.6–8.3)
NEUTROPHILS NFR BLD AUTO: 69 %
NRBC # BLD AUTO: 0 10E3/UL
NRBC BLD AUTO-RTO: 0 /100
NUC STRESS EJECTION FRACTION: 77 %
PLATELET # BLD AUTO: 222 10E3/UL (ref 150–450)
POTASSIUM BLD-SCNC: 4.3 MMOL/L (ref 3.4–5.3)
PROT SERPL-MCNC: 6.4 G/DL (ref 6.8–8.8)
RATE PRESSURE PRODUCT: NORMAL
RBC # BLD AUTO: 3.65 10E6/UL (ref 3.8–5.2)
SODIUM SERPL-SCNC: 135 MMOL/L (ref 133–144)
STRESS ECHO BASELINE DIASTOLIC HE: 91
STRESS ECHO BASELINE HR: 75
STRESS ECHO BASELINE SYSTOLIC BP: 162
STRESS ECHO CALCULATED PERCENT HR: 73 %
STRESS ECHO LAST STRESS DIASTOLIC BP: 65
STRESS ECHO LAST STRESS SYSTOLIC BP: 121
STRESS ECHO TARGET HR: 135
TROPONIN I SERPL-MCNC: 0.02 UG/L (ref 0–0.04)
TROPONIN I SERPL-MCNC: <0.015 UG/L (ref 0–0.04)
WBC # BLD AUTO: 6 10E3/UL (ref 4–11)

## 2021-08-18 PROCEDURE — 36415 COLL VENOUS BLD VENIPUNCTURE: CPT | Performed by: HOSPITALIST

## 2021-08-18 PROCEDURE — G0378 HOSPITAL OBSERVATION PER HR: HCPCS

## 2021-08-18 PROCEDURE — 99217 PR OBSERVATION CARE DISCHARGE: CPT | Performed by: INTERNAL MEDICINE

## 2021-08-18 PROCEDURE — A9502 TC99M TETROFOSMIN: HCPCS | Performed by: HOSPITALIST

## 2021-08-18 PROCEDURE — 343N000001 HC RX 343: Performed by: HOSPITALIST

## 2021-08-18 PROCEDURE — 82040 ASSAY OF SERUM ALBUMIN: CPT | Performed by: HOSPITALIST

## 2021-08-18 PROCEDURE — 250N000013 HC RX MED GY IP 250 OP 250 PS 637: Performed by: HOSPITALIST

## 2021-08-18 PROCEDURE — 84484 ASSAY OF TROPONIN QUANT: CPT | Performed by: HOSPITALIST

## 2021-08-18 PROCEDURE — 93017 CV STRESS TEST TRACING ONLY: CPT

## 2021-08-18 PROCEDURE — 93016 CV STRESS TEST SUPVJ ONLY: CPT | Performed by: INTERNAL MEDICINE

## 2021-08-18 PROCEDURE — 85025 COMPLETE CBC W/AUTO DIFF WBC: CPT | Performed by: HOSPITALIST

## 2021-08-18 PROCEDURE — 78452 HT MUSCLE IMAGE SPECT MULT: CPT

## 2021-08-18 PROCEDURE — 999N000049 HC STATISTIC ECHO STRESS OR NM NPI

## 2021-08-18 PROCEDURE — 78452 HT MUSCLE IMAGE SPECT MULT: CPT | Mod: 26 | Performed by: INTERNAL MEDICINE

## 2021-08-18 PROCEDURE — 250N000011 HC RX IP 250 OP 636: Performed by: HOSPITALIST

## 2021-08-18 PROCEDURE — 93018 CV STRESS TEST I&R ONLY: CPT | Performed by: INTERNAL MEDICINE

## 2021-08-18 PROCEDURE — 250N000013 HC RX MED GY IP 250 OP 250 PS 637: Performed by: EMERGENCY MEDICINE

## 2021-08-18 RX ORDER — LATANOPROST 50 UG/ML
1 SOLUTION/ DROPS OPHTHALMIC AT BEDTIME
Status: DISCONTINUED | OUTPATIENT
Start: 2021-08-18 | End: 2021-08-18 | Stop reason: HOSPADM

## 2021-08-18 RX ORDER — ACETAMINOPHEN 325 MG/1
650 TABLET ORAL EVERY 6 HOURS PRN
Status: DISCONTINUED | OUTPATIENT
Start: 2021-08-18 | End: 2021-08-18 | Stop reason: HOSPADM

## 2021-08-18 RX ORDER — NITROGLYCERIN 0.4 MG/1
0.4 TABLET SUBLINGUAL EVERY 5 MIN PRN
Status: DISCONTINUED | OUTPATIENT
Start: 2021-08-18 | End: 2021-08-18 | Stop reason: HOSPADM

## 2021-08-18 RX ORDER — CAFFEINE CITRATE 20 MG/ML
60 SOLUTION INTRAVENOUS
Status: DISCONTINUED | OUTPATIENT
Start: 2021-08-18 | End: 2021-08-18

## 2021-08-18 RX ORDER — ASPIRIN 81 MG/1
81 TABLET ORAL DAILY
Status: DISCONTINUED | OUTPATIENT
Start: 2021-08-18 | End: 2021-08-18 | Stop reason: HOSPADM

## 2021-08-18 RX ORDER — REGADENOSON 0.08 MG/ML
0.4 INJECTION, SOLUTION INTRAVENOUS ONCE
Status: COMPLETED | OUTPATIENT
Start: 2021-08-18 | End: 2021-08-18

## 2021-08-18 RX ORDER — ACETAMINOPHEN 650 MG/1
650 SUPPOSITORY RECTAL EVERY 6 HOURS PRN
Status: DISCONTINUED | OUTPATIENT
Start: 2021-08-18 | End: 2021-08-18 | Stop reason: HOSPADM

## 2021-08-18 RX ORDER — ACYCLOVIR 200 MG/1
0-1 CAPSULE ORAL
Status: DISCONTINUED | OUTPATIENT
Start: 2021-08-18 | End: 2021-08-18

## 2021-08-18 RX ORDER — MAGNESIUM HYDROXIDE/ALUMINUM HYDROXICE/SIMETHICONE 120; 1200; 1200 MG/30ML; MG/30ML; MG/30ML
30 SUSPENSION ORAL EVERY 4 HOURS PRN
Status: DISCONTINUED | OUTPATIENT
Start: 2021-08-18 | End: 2021-08-18 | Stop reason: HOSPADM

## 2021-08-18 RX ORDER — AMINOPHYLLINE 25 MG/ML
50-100 INJECTION, SOLUTION INTRAVENOUS
Status: DISCONTINUED | OUTPATIENT
Start: 2021-08-18 | End: 2021-08-18

## 2021-08-18 RX ORDER — ASPIRIN 81 MG/1
162 TABLET, CHEWABLE ORAL ONCE
Status: DISCONTINUED | OUTPATIENT
Start: 2021-08-18 | End: 2021-08-18 | Stop reason: HOSPADM

## 2021-08-18 RX ORDER — VITAMIN B COMPLEX
25 TABLET ORAL DAILY
Status: DISCONTINUED | OUTPATIENT
Start: 2021-08-18 | End: 2021-08-18 | Stop reason: HOSPADM

## 2021-08-18 RX ORDER — ONDANSETRON 4 MG/1
4 TABLET, ORALLY DISINTEGRATING ORAL EVERY 6 HOURS PRN
Status: DISCONTINUED | OUTPATIENT
Start: 2021-08-18 | End: 2021-08-18 | Stop reason: HOSPADM

## 2021-08-18 RX ORDER — ALBUTEROL SULFATE 90 UG/1
2 AEROSOL, METERED RESPIRATORY (INHALATION) EVERY 5 MIN PRN
Status: DISCONTINUED | OUTPATIENT
Start: 2021-08-18 | End: 2021-08-18

## 2021-08-18 RX ORDER — ONDANSETRON 2 MG/ML
4 INJECTION INTRAMUSCULAR; INTRAVENOUS EVERY 6 HOURS PRN
Status: DISCONTINUED | OUTPATIENT
Start: 2021-08-18 | End: 2021-08-18 | Stop reason: HOSPADM

## 2021-08-18 RX ORDER — METOPROLOL TARTRATE 25 MG/1
25 TABLET, FILM COATED ORAL 2 TIMES DAILY
Status: DISCONTINUED | OUTPATIENT
Start: 2021-08-18 | End: 2021-08-18 | Stop reason: HOSPADM

## 2021-08-18 RX ADMIN — APIXABAN 2.5 MG: 2.5 TABLET, FILM COATED ORAL at 08:00

## 2021-08-18 RX ADMIN — ASPIRIN 81 MG: 81 TABLET, COATED ORAL at 08:00

## 2021-08-18 RX ADMIN — REGADENOSON 0.4 MG: 0.08 INJECTION, SOLUTION INTRAVENOUS at 10:01

## 2021-08-18 RX ADMIN — METOPROLOL TARTRATE 25 MG: 25 TABLET, FILM COATED ORAL at 08:00

## 2021-08-18 RX ADMIN — APIXABAN 2.5 MG: 2.5 TABLET, FILM COATED ORAL at 01:38

## 2021-08-18 RX ADMIN — TETROFOSMIN 9.6 MCI.: 1.38 INJECTION, POWDER, LYOPHILIZED, FOR SOLUTION INTRAVENOUS at 07:35

## 2021-08-18 RX ADMIN — LATANOPROST 1 DROP: 50 SOLUTION/ DROPS OPHTHALMIC at 03:56

## 2021-08-18 RX ADMIN — SERTRALINE HYDROCHLORIDE 50 MG: 50 TABLET ORAL at 08:00

## 2021-08-18 RX ADMIN — TETROFOSMIN 28 MCI.: 1.38 INJECTION, POWDER, LYOPHILIZED, FOR SOLUTION INTRAVENOUS at 10:01

## 2021-08-18 RX ADMIN — Medication 25 MCG: at 08:00

## 2021-08-18 NOTE — PROGRESS NOTES
Observation Goals:    -diagnostic tests and consults completed and resulted: Not Met  -vital signs normal or at patient baseline: Met  Nurse to notify provider when observation goals have been met and patient is ready for discharge.

## 2021-08-18 NOTE — PROGRESS NOTES
RECEIVING UNIT ED HANDOFF REVIEW    ED Nurse Handoff Report was reviewed by: Delma Carrasquillo RN on August 18, 2021 at 1:30 AM

## 2021-08-18 NOTE — DISCHARGE SUMMARY
[facility]  Hospitalist Discharge Summary      Date of Admission:  8/17/2021  Date of Discharge:  8/18/2021  Discharging Provider: Bc Calvert DO      Discharge Diagnoses   Chest pain, unlikely cardiac   H/o Paroxysmal A fib   Chronic hyponatremia     Follow-ups Needed After Discharge   Follow-up Appointments     Follow-up and recommended labs and tests       Follow up with primary care provider, Carisa Waggoner, within 2-4 weeks, for   hospital follow- up. No follow up labs or test are needed.           Unresulted Labs Ordered in the Past 30 Days of this Admission     No orders found for last 31 day(s).        Discharge Disposition   Discharged to home  Condition at discharge: Stable    Hospital Course   Patient was admitted for ACS rule out.  Serial troponins negative.  Lexiscan done without evidence of ischemia.  Chest pain resolved while in the ED and had no recurrence.  Indications for return and discharge discussed.  Patient discussed in stable condition     Consultations This Hospital Stay   None    Code Status   Full Code    Time Spent on this Encounter   I, Bc Calvert DO, personally saw the patient today and spent greater than 30 minutes discharging this patient.       DO TORI Dawson [facility] OBSERVATION  [address] [facility] [address] [address]  Phone: [phone]  ______________________________________________________________________    Physical Exam   Vital Signs: Temp: 98  F (36.7  C) Temp src: Axillary BP: (!) 169/87 Pulse: 85   Resp: 16 SpO2: 98 % O2 Device: None (Room air)    Weight: 123 lbs 0 oz  General Appearance: Resting comfortably.  NAD   Respiratory: Clear to auscultation.  No respiratory distress  Cardiovascular: RRR.  No obvious murmurs  GI: Soft.  Non-distended  Skin: No obvious rashes or cyanosis to exposed skin  Other: No edema.  No calf tenderness         Primary Care Physician   Carisa Waggoner    Discharge Orders       Reason for your hospital stay    Chest pain     Follow-up and recommended labs and tests     Follow up with primary care provider, Carisa Waggoner, within 2-4 weeks, for hospital follow- up. No follow up labs or test are needed.     Activity    Your activity upon discharge: activity as tolerated     Diet    Follow this diet upon discharge: Orders Placed This Encounter      Regular Diet Adult       Significant Results and Procedures   Most Recent 3 CBC's:Recent Labs   Lab Test 08/18/21  0646 08/17/21  1809 04/23/21  0911 11/29/20  1444   WBC 6.0 7.2  --  6.1   HGB 11.8 12.0 12.5 12.3   MCV 98 96  --  100    246  --  226     Most Recent 3 BMP's:Recent Labs   Lab Test 08/18/21  0646 08/17/21  1809 04/23/21  0911    130* 131*   POTASSIUM 4.3 3.7 4.3   CHLORIDE 103 98 98   CO2 28 26 29   BUN 10 14 11   CR 0.60 0.59 0.68   ANIONGAP 4 6 4   MABEL 8.9 8.9 9.1   * 147* 95     Most Recent 3 Troponin's:Recent Labs   Lab Test 08/18/21  0646 08/18/21  0020 08/17/21  1809 01/19/20  1437   TROPI  --   --   --  <0.015   TROPONIN <0.015 0.021 <0.015  --    ,   Results for orders placed or performed during the hospital encounter of 08/17/21   XR Chest 2 Views    Narrative    CHEST TWO VIEW   8/17/2021 7:24 PM     HISTORY: Chest pain.    COMPARISON: Chest x-ray on 11/29/2020.      Impression    IMPRESSION: PA and lateral views of the chest were obtained. Stable  cardiomediastinal silhouette. Atherosclerotic vascular calcification  of the aortic knob. High lung volumes, likely due to underlying COPD  changes. No suspicious focal pulmonary opacities. No significant  pleural effusion or pneumothorax.     ALYSHA AGUILERA MD         SYSTEM ID:  [id number]   NM Lexiscan stress test (nuc card)     Value    Target     Baseline Systolic     Baseline Diastolic BP 91    Last Stress Systolic     Last Stress Diastolic BP 65    Baseline HR 75    Max HR 98    Calculated Percent HR 73    Rate Pressure Product  11,858.0    Left Ventricular EF 77    Narrative       The nuclear stress test is negative for inducible myocardial ischemia   or infarction.     Left ventricular function is hyperdynamic.     The left ventricular ejection fraction at stress is 77%.     A prior study was conducted on 5/8/2020.  This study has no change when   compared with the prior study.           Discharge Medications   Current Discharge Medication List      CONTINUE these medications which have NOT CHANGED    Details   alendronate (FOSAMAX) 70 MG tablet Take one tablet by mouth once a week with 8 ounces water.  Qty: 12 tablet, Refills: 2    Associated Diagnoses: Osteopenia, unspecified location      apixaban ANTICOAGULANT (ELIQUIS) 2.5 MG tablet Take 1 tablet (2.5 mg) by mouth 2 times daily  Qty: 180 tablet, Refills: 2    Associated Diagnoses: Tachycardia; Paroxysmal atrial fibrillation (H)      GARLIC PO Take 1 tablet by mouth daily       latanoprost (XALATAN) 0.005 % ophthalmic solution Place 1 drop into both eyes At Bedtime      metoprolol tartrate (LOPRESSOR) 25 MG tablet Take 1 tablet (25 mg) by mouth 2 times daily  Qty: 180 tablet, Refills: 2    Associated Diagnoses: Paroxysmal atrial fibrillation (H); SVT (supraventricular tachycardia) (H)      Multiple Vitamin (MULTI-VITAMIN) per tablet Take  by mouth. Equaline MVI - 1 daily  Qty: 100 tablet, Refills: 3      Omega-3 Fatty Acids (FISH OIL) 1200 MG capsule Take 1 capsule by mouth 2 times daily.      polyethylene glycol-propylene glycol (SYSTANE ULTRA) 0.4-0.3 % SOLN ophthalmic solution Place 1 drop into both eyes 2 times daily      pravastatin (PRAVACHOL) 40 MG tablet Take 1 tablet (40 mg) by mouth daily  Qty: 90 tablet, Refills: 3    Comments: ### DO NOT FILL NOW.  Please update patient's profile to reflect additional refills.  ####  Associated Diagnoses: Hyperlipidemia LDL goal <70      sertraline (ZOLOFT) 50 MG tablet TAKE ONE TABLET BY MOUTH ONE TIME DAILY  Qty: 90 tablet, Refills: 1     Associated Diagnoses: Anxiety      Vitamin D, Cholecalciferol, 25 MCG (1000 UT) TABS Take 25 mcg by mouth daily            Allergies   No Known Allergies

## 2021-08-18 NOTE — PHARMACY-ADMISSION MEDICATION HISTORY
Pharmacy Medication History  Admission medication history interview status for the 8/17/2021  admission is complete. See EPIC admission navigator for prior to admission medications     Location of Interview: Patient room  Medication history sources: Patient    Significant changes made to the medication list:      In the past week, patient estimated taking medication this percent of the time: greater than 90%    Additional medication history information:       Medication reconciliation completed by provider prior to medication history? No    Time spent in this activity: 15 minutes    Prior to Admission medications    Medication Sig Last Dose Taking? Auth Provider   alendronate (FOSAMAX) 70 MG tablet Take one tablet by mouth once a week with 8 ounces water. 8/16/2021 at AM Yes Carisa Waggoner MD   apixaban ANTICOAGULANT (ELIQUIS) 2.5 MG tablet Take 1 tablet (2.5 mg) by mouth 2 times daily 8/17/2021 at A Yes Lucius Truong MD   GARLIC PO Take 1 tablet by mouth daily  Past Week at Unknown time Yes Reported, Patient   latanoprost (XALATAN) 0.005 % ophthalmic solution Place 1 drop into both eyes At Bedtime 8/16/2021 at PM Yes Unknown, Entered By History   metoprolol tartrate (LOPRESSOR) 25 MG tablet Take 1 tablet (25 mg) by mouth 2 times daily 8/17/2021 at AM Yes Lucius Truong MD   Multiple Vitamin (MULTI-VITAMIN) per tablet Take  by mouth. Equaline MVI - 1 daily 8/17/2021 at AM Yes    Omega-3 Fatty Acids (FISH OIL) 1200 MG capsule Take 1 capsule by mouth 2 times daily. 8/17/2021 at AM Yes Reported, Patient   polyethylene glycol-propylene glycol (SYSTANE ULTRA) 0.4-0.3 % SOLN ophthalmic solution Place 1 drop into both eyes 2 times daily 8/17/2021 at AM Yes Unknown, Entered By History   pravastatin (PRAVACHOL) 40 MG tablet Take 1 tablet (40 mg) by mouth daily 8/16/2021 at PM Yes Carisa Waggoner MD   sertraline (ZOLOFT) 50 MG tablet TAKE ONE TABLET BY MOUTH ONE TIME DAILY 8/16/2021 at PM Yes Carisa Waggoner MD   Vitamin D,  Cholecalciferol, 25 MCG (1000 UT) TABS Take 25 mcg by mouth daily  8/17/2021 at AM Yes Reported, Patient   diltiazem ER (DILT-XR) 240 MG 24 hr ER beaded capsule Take 1 capsule (240 mg) by mouth daily   Lucius Truong MD       The information provided in this note is only as accurate as the sources available at the time of update(s)

## 2021-08-18 NOTE — PLAN OF CARE
A&OX4, VSS on RA. C/o mild chest pain this AM but resolved without intervention. S/p nuc med stress test. Has been chest pain free since. All discharge instructions reviewed with pt, pt verbalized understanding. All belongings returned to pt. Pt stable at time of discharge.

## 2021-08-18 NOTE — PROGRESS NOTES
Pt here in EKG for stress test.  Reports no CP at this time.  IV patent, VSS at pt baseline.  Lungs clear without wheezing.  Procedure explained.  No caffeine today per pt.    1010  Tolerated stress test well.  No ambulation on treadmill.  Denied any SOB, reported some CP after injection, but resolved at this time.  CP at center chest.  VSS, sinus rhythm throughout test.  Transported back to room in pain free stable condition  Report called to ISIAH Lamb Rn at 1015.

## 2021-08-18 NOTE — ED NOTES
"[facility]  ED Nurse Handoff Report    ED Chief complaint: Chest Pain      ED Diagnosis:   Final diagnoses:   Chest pain, unspecified type   Paroxysmal atrial fibrillation (H)       Code Status: Full Code    Allergies: No Known Allergies    Patient Story: chest pain  Focused Assessment:  Patient with a history of A fib on anticoagulation presents to ED via EMS for sudden onset of cheat pain from assisted living. Will be admitted for observation for significant cardiac history.     Treatments and/or interventions provided: no treatment at this time  Patient's response to treatments and/or interventions: NA    To be done/followed up on inpatient unit:  close monitor    Does this patient have any cognitive concerns?: na    Activity level - Baseline/Home:  Independent  Activity Level - Current:   Independent    Patient's Preferred language: English   Needed?: No    Isolation: None  Infection: Not Applicable  Patient tested for COVID 19 prior to admission: YES  Bariatric?: No    Vital Signs:   Vitals:    08/17/21 1806 08/17/21 1900   BP: (!) 146/87 (!) 160/105   Pulse: 92 95   Resp: 16    Temp: 98.6  F (37  C)    TempSrc: Oral    SpO2: 95% 98%   Weight: 55.8 kg (123 lb)    Height: 1.651 m (5' 5\")        Cardiac Rhythm:     Was the PSS-3 completed:   Yes  What interventions are required if any?               Family Comments: na  OBS brochure/video discussed/provided to patient/family: Yes              Name of person given brochure if not patient: na              Relationship to patient: na    For the majority of the shift this patient's behavior was Green.   Behavioral interventions performed were none.    ED NURSE PHONE NUMBER: *[id number]         "

## 2021-08-18 NOTE — PLAN OF CARE
List all goals to be met before discharge home:   - Serial troponins and stress test complete Yes  - Seen and cleared by consultant if applicable  Yes  - Adequate pain control on oral analgesia  Yes  - Vital signs normal or at patient baseline  Yes  - Safe disposition plan has been identified  Yes
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
## 2021-08-19 ENCOUNTER — PATIENT OUTREACH (OUTPATIENT)
Dept: CARE COORDINATION | Facility: CLINIC | Age: 86
End: 2021-08-19

## 2021-08-19 DIAGNOSIS — Z71.89 OTHER SPECIFIED COUNSELING: ICD-10-CM

## 2021-08-19 NOTE — PROGRESS NOTES
Clinic Care Coordination Contact  [facility]: Post-Discharge Note  SITUATION                                                      Admission:    Admission Date: 08/17/21   Reason for Admission: admitted for ACS rule out.  Discharge:   Discharge Date: 08/18/21  Discharge Diagnosis: Chest pain, unlikely cardiac H/o Paroxysmal A fib Chronic hyponatremia    BACKGROUND                                                      Patient was admitted for ACS rule out.  Serial troponins negative.  Lexiscan done without evidence of ischemia.  Chest pain resolved while in the ED and had no recurrence.  Indications for return and discharge discussed.  Patient discussed in stable condition       ASSESSMENT      Enrollment  Primary Care Care Coordination Status: Declined    Discharge Assessment  How are you doing now that you are home?: breathing heavily on the phone  How are your symptoms? (Red Flag symptoms escalate to triage hotline per guidelines): Improved  Do you feel your condition is stable enough to be safe at home until your provider visit?: Yes  Does the patient have their discharge instructions? : Yes  Does the patient have questions regarding their discharge instructions? : No  Were you started on any new medications or were there changes to any of your previous medications? : No  Does the patient have all of their medications?: Yes  Do you have questions regarding any of your medications? : No  Do you have all of your needed medical supplies or equipment (DME)?  (i.e. oxygen tank, CPAP, cane, etc.): Yes  Discharge follow-up appointment scheduled within 14 calendar days? : No  Is patient agreeable to assistance with scheduling? : No    Post-op ([facility] Only)  If the patient had a surgery or procedure, do they have any questions for a nurse?: No           PLAN                                                      Outpatient Plan:  Follow-up Appointments     Follow-up and recommended labs and tests       Follow up with  primary care provider, Carisa Waggoner, within 2-4 weeks, for   hospital follow- up. No follow up labs or test are needed.       No future appointments.      For any urgent concerns, please contact our 24 hour nurse triage line: [phone] ([phone])         Allyn GERONIMO Community Health Worker  Clinic Care Coordination  [facility]  Phone: [phone]

## 2021-08-19 NOTE — PROGRESS NOTES
Clinic Care Coordination Contact  [facility]/Voicemail       Clinical Data: Care Coordinator Outreach  Outreach attempted x 1.  Left message on patient's voicemail with call back information and requested return call.  Plan: Care Coordinator will try to reach patient again in 1-2 business days.    .Allyn GERONIMO Community Health Worker  Clinic Care Coordination  [facility]  Phone: [phone]

## 2021-08-19 NOTE — PROGRESS NOTES
Error    Allyn GERONIMO, Community Health Worker  Clinic Care Coordination  [facility]  Phone: [phone]

## 2021-09-06 ENCOUNTER — OFFICE VISIT (OUTPATIENT)
Dept: URGENT CARE | Facility: URGENT CARE | Age: 86
End: 2021-09-06
Payer: COMMERCIAL

## 2021-09-06 VITALS
DIASTOLIC BLOOD PRESSURE: 72 MMHG | SYSTOLIC BLOOD PRESSURE: 132 MMHG | HEART RATE: 56 BPM | TEMPERATURE: 97.5 F | OXYGEN SATURATION: 98 %

## 2021-09-06 DIAGNOSIS — N76.4 VULVAR ABSCESS: Primary | ICD-10-CM

## 2021-09-06 PROCEDURE — 56405 I&D VULVA/PERINEAL ABSCESS: CPT | Performed by: INTERNAL MEDICINE

## 2021-09-06 PROCEDURE — 99207 PR NON-BILLABLE SERV PER CHARTING: CPT | Performed by: INTERNAL MEDICINE

## 2021-09-06 RX ORDER — SULFAMETHOXAZOLE/TRIMETHOPRIM 800-160 MG
1 TABLET ORAL 2 TIMES DAILY
Qty: 14 TABLET | Refills: 0 | Status: SHIPPED | OUTPATIENT
Start: 2021-09-06 | End: 2021-09-13

## 2021-09-06 NOTE — PATIENT INSTRUCTIONS
Bactrim antibiotic 1 tablet 2 x day for 1 week    Eat yogurt while on antibiotics to avoid diarrhea    Warm sitz baths in tub to help wound drainage    Expect to heal over the next week    Recheck with your clinic if symptoms are not improving in 1 week      Patient Education     Abscess (Incision & Drainage)  An abscess is sometimes called a boil. It happens when bacteria get trapped under the skin and start to grow. Pus forms inside the abscess as the body responds to the bacteria. An abscess can happen with an insect bite, ingrown hair, blocked oil gland, pimple, cyst, or puncture wound.   Your healthcare provider has drained the pus from your abscess. If the abscess pocket was large, your healthcare provider may have put in gauze packing. Your provider will need to remove or replace it on your next visit. . You may not need antibiotics to treat a simple abscess, unless the infection is spreading into the skin around the wound (cellulitis).   The wound will take about 1 to 2 weeks to heal, depending on the size of the abscess. Healthy tissue will grow from the bottom and sides of the opening until it seals over.   Home care  These tips can help your wound heal:    The wound may drain for the first 2 days. Cover the wound with a clean dry dressing. Change the dressing if it becomes soaked with blood or pus.    If a gauze packing was placed inside the abscess pocket, you may be told to remove it yourself. You may do this in the shower. Once the packing is removed, you should wash the area in the shower, or clean the area as directed by your provider. Continue to do this until the skin opening has closed. Make sure you wash your hands after changing the packing or cleaning the wound.    If you were prescribed antibiotics, take them as directed until they are all gone.    You may use acetaminophen or ibuprofen to control pain, unless another pain medicine was prescribed. If you have liver disease or ever had a  stomach ulcer, talk with your healthcare provider before using these medicines.  Follow-up care  Follow up with your healthcare provider, or as advised. If a gauze packing was put in your wound, it should be removed in 1 to 2 days. Check your wound every day for any signs that the infection is getting worse. The signs are listed below.   When to seek medical advice  Call your healthcare provider right away if any of these occur:    Increasing redness or swelling    Red streaks in the skin leading away from the wound    Increasing local pain or swelling    Continued pus draining from the wound 2 days after treatment    Fever of 100.4 F (38 C) or higher, or as directed by your healthcare provider    Boil returns when you are at home  [employer] last reviewed this educational content on 8/1/2019 2000-2021 [employer]. All rights reserved. This information is not intended as a substitute for professional medical care. Always follow your healthcare professional's instructions.

## 2021-09-06 NOTE — PROGRESS NOTES
ASSESSMENT AND PLAN:      ICD-10-CM    1. Vulvar abscess  N76.4 sulfamethoxazole-trimethoprim (BACTRIM DS) 800-160 MG tablet     I&D VULVA/PERINEAL ABSCESS     Partially drained on on  I& D to enlarge opening and assist in drainage of absess    Discussed care  Bactrim        Patient Instructions       Bactrim antibiotic 1 tablet 2 x day for 1 week    Eat yogurt while on antibiotics to avoid diarrhea    Warm sitz baths in tub to help wound drainage    Expect to heal over the next week    Recheck with your clinic if symptoms are not improving in 1 week      Patient Education     Abscess (Incision & Drainage)  An abscess is sometimes called a boil. It happens when bacteria get trapped under the skin and start to grow. Pus forms inside the abscess as the body responds to the bacteria. An abscess can happen with an insect bite, ingrown hair, blocked oil gland, pimple, cyst, or puncture wound.   Your healthcare provider has drained the pus from your abscess. If the abscess pocket was large, your healthcare provider may have put in gauze packing. Your provider will need to remove or replace it on your next visit. . You may not need antibiotics to treat a simple abscess, unless the infection is spreading into the skin around the wound (cellulitis).   The wound will take about 1 to 2 weeks to heal, depending on the size of the abscess. Healthy tissue will grow from the bottom and sides of the opening until it seals over.   Home care  These tips can help your wound heal:    The wound may drain for the first 2 days. Cover the wound with a clean dry dressing. Change the dressing if it becomes soaked with blood or pus.    If a gauze packing was placed inside the abscess pocket, you may be told to remove it yourself. You may do this in the shower. Once the packing is removed, you should wash the area in the shower, or clean the area as directed by your provider. Continue to do this until the skin opening has closed. Make sure you  "wash your hands after changing the packing or cleaning the wound.    If you were prescribed antibiotics, take them as directed until they are all gone.    You may use acetaminophen or ibuprofen to control pain, unless another pain medicine was prescribed. If you have liver disease or ever had a stomach ulcer, talk with your healthcare provider before using these medicines.  Follow-up care  Follow up with your healthcare provider, or as advised. If a gauze packing was put in your wound, it should be removed in 1 to 2 days. Check your wound every day for any signs that the infection is getting worse. The signs are listed below.   When to seek medical advice  Call your healthcare provider right away if any of these occur:    Increasing redness or swelling    Red streaks in the skin leading away from the wound    Increasing local pain or swelling    Continued pus draining from the wound 2 days after treatment    Fever of 100.4 F (38 C) or higher, or as directed by your healthcare provider    Boil returns when you are at home  [employer] last reviewed this educational content on 8/1/2019 2000-2021 [employer]. All rights reserved. This information is not intended as a substitute for professional medical care. Always follow your healthcare professional's instructions.             Return in about 1 week (around 9/13/2021), or if symptoms worsen or fail to improve.        Jessy Joseph MD  [facility]    Subjective     Jennifer Whiteside is a 86 year old who presents for Patient presents with:  Urgent Care  Vaginal Problem: notice for several day. the pain is progressing for pt. tender and uncomfortable. Pt has applied abx onitment and has not improve    an established patient of [facility].    Vaginal cyst    Onset of symptoms was 1 week(s) ago.  Course of illness is same.    Location: right vulva area  Context: no change in size  Has a \"head\"  Current and Associated " symptoms: pain and   Started bleeding today, noticed on underwear at appointment   Treatment measures tried include: antibiotic ointment  Relief from treatment: none      Review of Systems        Objective    /72   Pulse 56   Temp 97.5  F (36.4  C) (Tympanic)   SpO2 98%   Physical Exam  Vitals reviewed.   Constitutional:       Appearance: Normal appearance.   Genitourinary:     Comments: right vulva - 3 cm fluctuant cyst  With some bloody drainage    Procedure   Lidocaine 1% with EPi  11 blade 3 mm incision with some discharge    Neurological:      Mental Status: She is alert.

## 2021-09-19 ENCOUNTER — HEALTH MAINTENANCE LETTER (OUTPATIENT)
Age: 86
End: 2021-09-19

## 2021-09-23 ENCOUNTER — OFFICE VISIT (OUTPATIENT)
Dept: INTERNAL MEDICINE | Facility: CLINIC | Age: 86
End: 2021-09-23
Payer: COMMERCIAL

## 2021-09-23 VITALS
BODY MASS INDEX: 20.92 KG/M2 | TEMPERATURE: 97.8 F | WEIGHT: 122.5 LBS | RESPIRATION RATE: 16 BRPM | HEIGHT: 64 IN | SYSTOLIC BLOOD PRESSURE: 121 MMHG | OXYGEN SATURATION: 98 % | DIASTOLIC BLOOD PRESSURE: 69 MMHG | HEART RATE: 78 BPM

## 2021-09-23 DIAGNOSIS — F41.9 ANXIETY: ICD-10-CM

## 2021-09-23 DIAGNOSIS — R07.9 CHEST PAIN, UNSPECIFIED TYPE: Primary | ICD-10-CM

## 2021-09-23 DIAGNOSIS — M85.80 OSTEOPENIA, UNSPECIFIED LOCATION: ICD-10-CM

## 2021-09-23 PROCEDURE — 99213 OFFICE O/P EST LOW 20 MIN: CPT | Performed by: INTERNAL MEDICINE

## 2021-09-23 RX ORDER — ALENDRONATE SODIUM 70 MG/1
TABLET ORAL
Qty: 12 TABLET | Refills: 3 | Status: SHIPPED | OUTPATIENT
Start: 2021-09-23 | End: 2022-10-04

## 2021-09-23 ASSESSMENT — MIFFLIN-ST. JEOR: SCORE: 981.69

## 2021-09-23 NOTE — NURSING NOTE
"/69 (BP Location: Right arm, Patient Position: Sitting, Cuff Size: Adult Regular)   Pulse 78   Temp 97.8  F (36.6  C) (Oral)   Resp 16   Ht 1.619 m (5' 3.75\")   Wt 55.6 kg (122 lb 8 oz)   SpO2 98%   BMI 21.19 kg/m      "

## 2021-09-23 NOTE — PROGRESS NOTES
"    Assessment & Plan     Chest pain, unspecified type  Likely non-cardiac, may have been acid reflux or chest wall. Recommend gentle stretches, tylenol, antacid if recurs.     Osteopenia, unspecified location  Needs refill  - alendronate (FOSAMAX) 70 MG tablet; Take one tablet by mouth once a week with 8 ounces water.    Anxiety  Stable, needs refill   - sertraline (ZOLOFT) 50 MG tablet; Take 1 tablet (50 mg) by mouth daily        Return in about 7 months (around 4/24/2022) for Wellness visit.    Carisa Waggoner MD  [facility] GIOVANI Aden is a 86 year old who presents for the following health issues     HPI       Hospital Follow-up Visit:    Hospital/Nursing Home/IP Rehab Facility: [facility]  Date of Admission: 08/17/2021  Date of Discharge: 08/18/2021  Reason(s) for Admission: Chest pain       Was your hospitalization related to COVID-19? No   Problems taking medications regularly:  None  Medication changes since discharge: None  Problems adhering to non-medication therapy:  None    Summary of hospitalization:  [facility] discharge summary reviewed  Diagnostic Tests/Treatments reviewed.  Follow up needed: none  Other Healthcare Providers Involved in Patient s Care:         None  Update since discharge: improved. She has not had any further episodes of chest pain. No other acute concerns today.     Post Discharge Medication Reconciliation: discharge medications reconciled and changed, per note/orders.  Plan of care communicated with patient              Review of Systems   No fever, chills, chest pains, dyspnea, heartburn      Objective    /69 (BP Location: Right arm, Patient Position: Sitting, Cuff Size: Adult Regular)   Pulse 78   Temp 97.8  F (36.6  C) (Oral)   Resp 16   Ht 1.619 m (5' 3.75\")   Wt 55.6 kg (122 lb 8 oz)   SpO2 98%   BMI 21.19 kg/m    Body mass index is 21.19 kg/m .  Physical Exam           Not examined.     "

## 2021-10-13 ENCOUNTER — TRANSFERRED RECORDS (OUTPATIENT)
Dept: HEALTH INFORMATION MANAGEMENT | Facility: CLINIC | Age: 86
End: 2021-10-13

## 2021-11-06 ENCOUNTER — ANCILLARY PROCEDURE (OUTPATIENT)
Dept: GENERAL RADIOLOGY | Facility: CLINIC | Age: 86
End: 2021-11-06
Attending: FAMILY MEDICINE
Payer: COMMERCIAL

## 2021-11-06 ENCOUNTER — OFFICE VISIT (OUTPATIENT)
Dept: URGENT CARE | Facility: URGENT CARE | Age: 86
End: 2021-11-06
Payer: COMMERCIAL

## 2021-11-06 VITALS
HEART RATE: 80 BPM | DIASTOLIC BLOOD PRESSURE: 86 MMHG | TEMPERATURE: 97.2 F | OXYGEN SATURATION: 99 % | SYSTOLIC BLOOD PRESSURE: 150 MMHG

## 2021-11-06 DIAGNOSIS — M54.16 RIGHT LUMBAR RADICULOPATHY: ICD-10-CM

## 2021-11-06 DIAGNOSIS — M79.18 RIGHT BUTTOCK PAIN: Primary | ICD-10-CM

## 2021-11-06 DIAGNOSIS — M79.18 RIGHT BUTTOCK PAIN: ICD-10-CM

## 2021-11-06 PROCEDURE — 72100 X-RAY EXAM L-S SPINE 2/3 VWS: CPT | Performed by: RADIOLOGY

## 2021-11-06 PROCEDURE — 99214 OFFICE O/P EST MOD 30 MIN: CPT | Performed by: FAMILY MEDICINE

## 2021-11-06 PROCEDURE — 73502 X-RAY EXAM HIP UNI 2-3 VIEWS: CPT | Mod: RT | Performed by: RADIOLOGY

## 2021-11-06 RX ORDER — PREDNISONE 20 MG/1
TABLET ORAL
Qty: 10 TABLET | Refills: 0 | Status: SHIPPED | OUTPATIENT
Start: 2021-11-06 | End: 2022-03-01

## 2021-11-06 ASSESSMENT — PAIN SCALES - GENERAL: PAINLEVEL: WORST PAIN (10)

## 2021-11-06 NOTE — PROGRESS NOTES
SUBJECTIVE:   Jennifer Whiteside is a 86 year old female with a history of osteopenia and osteoarthrosis.  Patient is presenting with a chief complaint of sudden-onset, traumatic sharp constant pain at the right upper buttock region.  Patient was lifting a heavy bag of groceries when the pain started.  There is shooting pain down the anterior right leg to the right anterior ankle when walking.    Onset of symptoms was one week ago.  Course of illness is worsening (over the past three days).  .    Severity severe pain.     Treatment measures tried include Heat.  Patient visiting a chiropractor two days ago. This specialist told the patient that the pain was due to a muscle and recommended stretches.  The stretches of a bent knee towards to the shoulder have worsened the pain. .    No numbness in the right foot/right toes.    No problems moving the right ankle/right toes/right knee.    .    Past Medical History:   Diagnosis Date     Atrial fibrillation (H) 5/2012    with RVR.  Occurred POD #2 following carotid enarterectomy.     Depressive disorder      Elevated LFTs     occurred on lipitor; resolved off medications     Esophageal stenosis      Hyperlipidaemia      Hyperlipidemia      Irregular heart beat      New onset atrial fibrillation (H) 5/13/2012     Osteoarthrosis      PAD (peripheral artery disease) (H)      PVD (peripheral vascular disease) (H)     s/p carotid enarterectomy 5-10-12     SVT (supraventricular tachycardia) (H) 4/21/2020     Current Outpatient Medications   Medication Sig Dispense Refill     alendronate (FOSAMAX) 70 MG tablet Take one tablet by mouth once a week with 8 ounces water. 12 tablet 3     apixaban ANTICOAGULANT (ELIQUIS) 2.5 MG tablet Take 1 tablet (2.5 mg) by mouth 2 times daily 180 tablet 2     GARLIC PO Take 1 tablet by mouth daily        latanoprost (XALATAN) 0.005 % ophthalmic solution Place 1 drop into both eyes At Bedtime       metoprolol tartrate (LOPRESSOR) 25 MG tablet  Take 1 tablet (25 mg) by mouth 2 times daily 180 tablet 2     Multiple Vitamin (MULTI-VITAMIN) per tablet Take  by mouth. Equaline MVI - 1 daily 100 tablet 3     Omega-3 Fatty Acids (FISH OIL) 1200 MG capsule Take 1 capsule by mouth 2 times daily.       polyethylene glycol-propylene glycol (SYSTANE ULTRA) 0.4-0.3 % SOLN ophthalmic solution Place 1 drop into both eyes 2 times daily       pravastatin (PRAVACHOL) 40 MG tablet Take 1 tablet (40 mg) by mouth daily 90 tablet 3     sertraline (ZOLOFT) 50 MG tablet Take 1 tablet (50 mg) by mouth daily 90 tablet 1     Vitamin D, Cholecalciferol, 25 MCG (1000 UT) TABS Take 25 mcg by mouth daily        Social History     Tobacco Use     Smoking status: Former Smoker     Packs/day: 0.50     Years: 3.00     Pack years: 1.50     Types: Cigarettes     Smokeless tobacco: Never Used     Tobacco comment: quit 1958   Substance Use Topics     Alcohol use: Yes     Comment: occ wine       ROS:  CONSTITUTIONAL:NEGATIVE  for fevers.   MUSCULOSKELETAL:  Positive for severe right upper buttock pain.   NEURO: positive for shooting pain down the right anterior leg when walking.      OBJECTIVE:  BP (!) 150/86   Pulse 80   Temp 97.2  F (36.2  C) (Tympanic)   LMP  (LMP Unknown)   SpO2 99%   Breastfeeding No   GENERAL APPEARANCE: alert and in severe pain.   MS:   BACK:  No tenderness over the lumbar spine/muscles of the low back/sacrum/sacroiliac joints.  I was unable to reproduce the pain with palpation over the right buttock. Tests that stressed the sacroiliac joints were within normal limits.     STRAIGHT LEG RAISES:  Negative on both legs.    NEURO: Normal strength and tone, sensory exam grossly normal, mentation intact, speech normal, light touch normal, normal strength throughout and DTRs 1/4 at the patellas and Achilles.      X-rays were viewed by me during this clinic encounter.      The X-rays of the lumbar spine scoliosis, degenerative changes and narrowing at most disc spaces,   There is a possible compression fracture (wedge-shaped) at L3 (seen on the AP view but not on the lateral view).      The X-rays of the right pelvis showed multiple phleboliths, no fractures at the pelvis.  The hip joint was within normal limits .      ASSESSMENT:  Right buttock pain    Right lumbar radiculopathy, most likely involving the Right L4 nerve based on the distribution of the shooting pain down the right leg.  No obvious muscle weakness nor decreased light touch sensation was seen on today's exam.        Possible compression fracture of L3 vertebra.  The radiology report is pending.      PLAN:  Take Tylenol for the pain    follow up with an orthopedic specialist within the next 2-3 days.  I ordered an orthopedic referral for the patient. .      Place ice over the painful areas of the right upper buttock    Rx:  Prednisone    Go to the emergency room if experiencing weakness/numbness in the right leg/right foot.     Carl Toth MD

## 2021-11-06 NOTE — PATIENT INSTRUCTIONS
Place ice over the painful areas of the right upper buttock for 20 minutes at a time, every 2 hours while awake to numb up some of the pain.      Take Tylenol for the pain.      follow up with an orthopedic specialist in 2-3 days.      Go to the emergency room if you develop weakness/numbness in the right leg/right foot.

## 2021-11-07 ENCOUNTER — TELEPHONE (OUTPATIENT)
Dept: URGENT CARE | Facility: URGENT CARE | Age: 86
End: 2021-11-07
Payer: COMMERCIAL

## 2021-11-07 NOTE — TELEPHONE ENCOUNTER
Called and spoke to patient regarding xray result. Patient said she already looked at these on MyChart. She will follow up with ortho regarding the next steps. Pt had no further questions or concerns.    ALEKSANDR Coker  10:26 AM 11/7/2021

## 2021-11-07 NOTE — TELEPHONE ENCOUNTER
"SUBJECTIVE:  The radiology report on the November 6, 2021, X-rays of the lumbar spine did not find fractures.  Scoliosis is present (Patient already knows about this.)  There is some partial dislocation of the L2 vertebra to the right side by about 9mm.  There is also some partial dislocation of the L4 vertebra to the left side by about 10 mm.  There is also a lot of bone-on-bone degenerative joint disease (also known as \"wear and tear\") at the mid-lower lumbar spine.      The X-rays of the pelvis did not show fractures.      PLAN:  Please notify patient of these results.  I already ordered an orthopedic referral.  Patient should follow up with an orthopedic specialist for further evaluation of the severe pain.     Carl Toth MD    "

## 2021-11-10 DIAGNOSIS — I48.0 PAROXYSMAL ATRIAL FIBRILLATION (H): ICD-10-CM

## 2021-11-10 DIAGNOSIS — I47.10 SVT (SUPRAVENTRICULAR TACHYCARDIA) (H): ICD-10-CM

## 2021-11-10 RX ORDER — METOPROLOL TARTRATE 25 MG/1
25 TABLET, FILM COATED ORAL 2 TIMES DAILY
Qty: 180 TABLET | Refills: 0 | Status: SHIPPED | OUTPATIENT
Start: 2021-11-10 | End: 2022-02-17

## 2021-11-12 ENCOUNTER — OFFICE VISIT (OUTPATIENT)
Dept: NEUROSURGERY | Facility: CLINIC | Age: 86
End: 2021-11-12
Attending: PHYSICIAN ASSISTANT
Payer: COMMERCIAL

## 2021-11-12 VITALS
OXYGEN SATURATION: 96 % | WEIGHT: 122 LBS | HEART RATE: 84 BPM | BODY MASS INDEX: 21.11 KG/M2 | DIASTOLIC BLOOD PRESSURE: 73 MMHG | SYSTOLIC BLOOD PRESSURE: 116 MMHG

## 2021-11-12 DIAGNOSIS — M79.18 RIGHT BUTTOCK PAIN: ICD-10-CM

## 2021-11-12 DIAGNOSIS — M54.16 RIGHT LUMBAR RADICULOPATHY: Primary | ICD-10-CM

## 2021-11-12 PROCEDURE — 99203 OFFICE O/P NEW LOW 30 MIN: CPT | Performed by: PHYSICIAN ASSISTANT

## 2021-11-12 PROCEDURE — G0463 HOSPITAL OUTPT CLINIC VISIT: HCPCS

## 2021-11-12 ASSESSMENT — PAIN SCALES - GENERAL: PAINLEVEL: MODERATE PAIN (4)

## 2021-11-12 NOTE — PROGRESS NOTES
NEUROSURGERY CLINIC CONSULT NOTE     DATE OF VISIT: 11/12/2021     SUBJECTIVE:     Jennifer Whiteside is a pleasant 86 year old female who presents to the clinic today for consultation on lumbar spine pain with intermittent and very mild right buttock pain. She is referred to the Neurosurgery Clinic by Dr. Waggoner in Primary Care.   Today, she reports a two-week history of symptoms. She describes a daily with fluctuating intensity, aching, dull pain that initiates in the right lateral low lumbar region and radiates distally to her buttock, nothing more distal. This pain is not accompanied by paresthesia, numbness or perceived weakness. She really cannot appreciate any aggravating or alleviating factors. Lifting a bag of groceries into her car is associated with the onset of her pain. There are no bowel or bladder changes. She denies saddle anesthesia. She denies changes in gait, instability, or falling episodes.   She has participated in conservative therapies to include ice, tylenol and chiropractic care. The ice and tylenol have provided good relief while her recent chiropractic adjustment seem to exacerbate her pain.          Current Outpatient Medications:      alendronate (FOSAMAX) 70 MG tablet, Take one tablet by mouth once a week with 8 ounces water., Disp: 12 tablet, Rfl: 3     apixaban ANTICOAGULANT (ELIQUIS) 2.5 MG tablet, Take 1 tablet (2.5 mg) by mouth 2 times daily, Disp: 180 tablet, Rfl: 2     GARLIC PO, Take 1 tablet by mouth daily , Disp: , Rfl:      latanoprost (XALATAN) 0.005 % ophthalmic solution, Place 1 drop into both eyes At Bedtime, Disp: , Rfl:      metoprolol tartrate (LOPRESSOR) 25 MG tablet, Take 1 tablet (25 mg) by mouth 2 times daily, Disp: 180 tablet, Rfl: 0     Multiple Vitamin (MULTI-VITAMIN) per tablet, Take  by mouth. Equaline MVI - 1 daily, Disp: 100 tablet, Rfl: 3     Omega-3 Fatty Acids (FISH OIL) 1200 MG capsule, Take 1 capsule by mouth 2 times daily., Disp: , Rfl:       polyethylene glycol-propylene glycol (SYSTANE ULTRA) 0.4-0.3 % SOLN ophthalmic solution, Place 1 drop into both eyes 2 times daily, Disp: , Rfl:      pravastatin (PRAVACHOL) 40 MG tablet, Take 1 tablet (40 mg) by mouth daily, Disp: 90 tablet, Rfl: 3     predniSONE (DELTASONE) 20 MG tablet, Take 2 tabs PO daily x 5 days, Disp: 10 tablet, Rfl: 0     sertraline (ZOLOFT) 50 MG tablet, Take 1 tablet (50 mg) by mouth daily, Disp: 90 tablet, Rfl: 1     Vitamin D, Cholecalciferol, 25 MCG (1000 UT) TABS, Take 25 mcg by mouth daily , Disp: , Rfl:      No Known Allergies     Past Medical History:   Diagnosis Date     Atrial fibrillation (H) 5/2012    with RVR.  Occurred POD #2 following carotid enarterectomy.     Depressive disorder      Elevated LFTs     occurred on lipitor; resolved off medications     Esophageal stenosis      Hyperlipidaemia      Hyperlipidemia      Irregular heart beat      New onset atrial fibrillation (H) 5/13/2012     Osteoarthrosis      PAD (peripheral artery disease) (H)      PVD (peripheral vascular disease) (H)     s/p carotid enarterectomy 5-10-12     SVT (supraventricular tachycardia) (H) 4/21/2020        ROS: 10 point review of symptoms are negative other than the symptoms noted above in the HPI.     Family History has been reviewed with the patient, there are no pertinent findings to presenting concern.     Past Surgical History:   Procedure Laterality Date     APPENDECTOMY      appy as a child     C HILLIARD W/O FACETEC FORAMOT/DSKC 1/2 VRT SEG, LUMBAR      L4     CLOSED REDUCTION WRIST Right 1/20/2016    Procedure: CLOSED REDUCTION WRIST;  Surgeon: Mina Brand MD;  Location:  OR     COLONOSCOPY  8/6/2013    Procedure: COMBINED COLONOSCOPY, SINGLE BIOPSY/POLYPECTOMY BY BIOPSY;  COLONOSCOPY with polypectomy using cold forceps.;  Surgeon: Madeline Oviedo MD;  Location:  GI     COLONOSCOPY  8/29/2016    Dr. Oviedo [facility]     COLONOSCOPY N/A 8/29/2016    Procedure: COMBINED COLONOSCOPY,  SINGLE OR MULTIPLE BIOPSY/POLYPECTOMY BY BIOPSY;  Surgeon: Madeline Oviedo MD;  Location: [facility] GI     ENDARTERECTOMY CAROTID  5/10/2012    LEFT, WITH EEG ; Surgeon:SELINA HANKS; Location:[facility] OR     ENT SURGERY      T&A as a child     HC CORRECT BUNION,SIMPLE      Right     HC REPAIR ROTATOR CUFF,ACUTE  2000    Right     HC REPAIR ROTATOR CUFF,ACUTE  2002    Left     HYSTERECTOMY, VAGINAL      simple      rt foot hammer toe repair  2005        Social History     Tobacco Use     Smoking status: Former Smoker     Packs/day: 0.50     Years: 3.00     Pack years: 1.50     Types: Cigarettes     Smokeless tobacco: Never Used     Tobacco comment: quit 1958   Substance Use Topics     Alcohol use: Yes     Comment: occ wine     Drug use: No        OBJECTIVE:   /73   Pulse 84   Wt 122 lb (55.3 kg)   LMP  (LMP Unknown)   SpO2 96%   BMI 21.11 kg/m     Body mass index is 21.11 kg/m .     Imaging:     XR LUMBAR SPINE 2-3 VIEWS  11/6/2021 1:43 PM    Findings, per radiologist read, notable for:      Midlumbar levoconvex scoliosis centered at L3 measures 27  degrees. Rotatory component. Normal vertebral body heights. Negative  for fracture. Negative for sagittal subluxation. No pars defects  identified. Severe discogenic changes at L2-3, L3-4 and L4-5 with disc  height loss, endplate sclerosis, vacuum change and marginal  osteophytes. More pronounced along the concavity of the scoliotic  curvature. 9 mm right lateral subluxation of L2 on L3. 10 mm left  lateral subluxation of L4 on L5. Moderate to severe facet DJD in the  mid-lower lumbar spine.    Full radiological report in chart. Imaging was reviewed with with patient today.     Exam:     Patient appears comfortable, conversational, and in no apparent distress.   Head: Normocephalic, without obvious abnormality, atraumatic, no facial asymmetry.   Eyes: conjunctivae/corneas clear. PERRL, EOM's intact.   Throat: lips, mucosa, and tongue normal; teeth and gums normal.    Neck: supple, symmetrical, trachea midline, no adenopathy and thyroid: not enlarged, symmetric, no tenderness/mass/nodules.   Lungs: clear to auscultation bilaterally.   Heart: regular rate and rhythm.   Abdomen: soft, non-tender; bowel sounds normal; no masses, no organomegaly.   Pulses: 2+ and symmetric.   Skin: Skin color, texture, turgor normal. No rashes or lesions.     CN II-XII grossly intact, alert and appropriate with conversation and following commands.   Gait is non-antalgic. Able to tandem walk. Able to walk on toes and heels without difficulty.   Cervical spine is non tender to palpation. Appropriate range of motion of neck, not concerning for lhermitte's phenomenon.   Bilateral bicep 2/4 and tricep reflexes 1/4. Sensation intact throughout upper extremities.     UE muscle strength  Right  Left    Deltoid  5/5  5/5    Biceps  5/5  5/5    Triceps  5/5  5/5    Hand intrinsics  5/5  5/5    Hand grasp  5/5  5/5    Segovia signs  neg  neg      Lumbar spine is non tender to palpation.  Intact sensation throughout lower extremities.   Bilateral patellar 2/4 and achilles reflex 1/4. Negative for pain with straight leg raise.     LE muscle strength  Right  Left    Iliopsoas (hip flexion)  5/5  5/5    Quad (knee extension)  5/5  5/5    Hamstring (knee flexion)  5/5  5/5    Gastrocnemius (PF)  5/5  5/5    Tibialis Ant. (DF)  5/5  5/5    EHL  5/5  5/5      Negative Babinski bilaterally. Negative for clonus.   Calves are soft and non-tender bilaterally.     ASSESSMENT/PLAN:     Jennifer Whiteside is a 86 year old female who presents to the clinic for consultation on a two-week history of a daily with fluctuating intensity, aching, dull pain that initiates in the right lateral low lumbar region and radiates distally to her buttock, nothing more distal. This pain is not accompanied by paresthesia, numbness or perceived weakness. The patient's most recent imaging was reviewed with her today. It was explained  that images show severe discogenic changes at L2-3, L3-4 and L4-5 with disc height loss, endplate sclerosis, vacuum change and marginal osteophytes. On exam, she is noted to have appropriate strength, sensation and range of motion. She has attempted conservative management with ice, tylenol and chiropractic care. The ice and tylenol have provided good relief while her recent chiropractic adjustment seem to exacerbate her pain. , without resolution of symptoms.     Based on her physical exam, imaging review, and past treatments, we feel that it would be in her best interest to continue a conservative approach by participating in a physical therapy program. We also discussed obtaining an epidural steroid injection, but she is not interested in an injection. A referral to physical therapy was provided.      We would like to see her back as needed to further discuss possible surgical interventions or other conservative therapies. We also discussed signs of a worsening problem that she should seek being evaluated.        Respectfully,     SUZANNE Rich, KIRBY  [facility] Neurosurgery  [facility]  Tel: [phone]      Exam, imaging, and plan reviewed by Dr. Chavez.

## 2021-11-12 NOTE — LETTER
11/12/2021         RE: Jennifer Whiteside  [address]  [place] [facility] [place] [address]        Dear Colleague,    Thank you for referring your patient, Jennifer Whiteside, to the [facility] NEUROSURGERY [facility]IC [place]. Please see a copy of my visit note below.    NEUROSURGERY CLINIC CONSULT NOTE     DATE OF VISIT: 11/12/2021     SUBJECTIVE:     Jennifer Whiteside is a pleasant 86 year old female who presents to the clinic today for consultation on lumbar spine pain with intermittent and very mild right buttock pain. She is referred to the Neurosurgery Clinic by Dr. Waggoner in Primary Care.   Today, she reports a two-week history of symptoms. She describes a daily with fluctuating intensity, aching, dull pain that initiates in the right lateral low lumbar region and radiates distally to her buttock, nothing more distal. This pain is not accompanied by paresthesia, numbness or perceived weakness. She really cannot appreciate any aggravating or alleviating factors. Lifting a bag of groceries into her car is associated with the onset of her pain. There are no bowel or bladder changes. She denies saddle anesthesia. She denies changes in gait, instability, or falling episodes.   She has participated in conservative therapies to include ice, tylenol and chiropractic care. The ice and tylenol have provided good relief while her recent chiropractic adjustment seem to exacerbate her pain.          Current Outpatient Medications:      alendronate (FOSAMAX) 70 MG tablet, Take one tablet by mouth once a week with 8 ounces water., Disp: 12 tablet, Rfl: 3     apixaban ANTICOAGULANT (ELIQUIS) 2.5 MG tablet, Take 1 tablet (2.5 mg) by mouth 2 times daily, Disp: 180 tablet, Rfl: 2     GARLIC PO, Take 1 tablet by mouth daily , Disp: , Rfl:      latanoprost (XALATAN) 0.005 % ophthalmic solution, Place 1 drop into both eyes At Bedtime, Disp: , Rfl:      metoprolol tartrate (LOPRESSOR) 25  MG tablet, Take 1 tablet (25 mg) by mouth 2 times daily, Disp: 180 tablet, Rfl: 0     Multiple Vitamin (MULTI-VITAMIN) per tablet, Take  by mouth. Equaline MVI - 1 daily, Disp: 100 tablet, Rfl: 3     Omega-3 Fatty Acids (FISH OIL) 1200 MG capsule, Take 1 capsule by mouth 2 times daily., Disp: , Rfl:      polyethylene glycol-propylene glycol (SYSTANE ULTRA) 0.4-0.3 % SOLN ophthalmic solution, Place 1 drop into both eyes 2 times daily, Disp: , Rfl:      pravastatin (PRAVACHOL) 40 MG tablet, Take 1 tablet (40 mg) by mouth daily, Disp: 90 tablet, Rfl: 3     predniSONE (DELTASONE) 20 MG tablet, Take 2 tabs PO daily x 5 days, Disp: 10 tablet, Rfl: 0     sertraline (ZOLOFT) 50 MG tablet, Take 1 tablet (50 mg) by mouth daily, Disp: 90 tablet, Rfl: 1     Vitamin D, Cholecalciferol, 25 MCG (1000 UT) TABS, Take 25 mcg by mouth daily , Disp: , Rfl:      No Known Allergies     Past Medical History:   Diagnosis Date     Atrial fibrillation (H) 5/2012    with RVR.  Occurred POD #2 following carotid enarterectomy.     Depressive disorder      Elevated LFTs     occurred on lipitor; resolved off medications     Esophageal stenosis      Hyperlipidaemia      Hyperlipidemia      Irregular heart beat      New onset atrial fibrillation (H) 5/13/2012     Osteoarthrosis      PAD (peripheral artery disease) (H)      PVD (peripheral vascular disease) (H)     s/p carotid enarterectomy 5-10-12     SVT (supraventricular tachycardia) (H) 4/21/2020        ROS: 10 point review of symptoms are negative other than the symptoms noted above in the HPI.     Family History has been reviewed with the patient, there are no pertinent findings to presenting concern.     Past Surgical History:   Procedure Laterality Date     APPENDECTOMY      appy as a child     C HILLIARD W/O FACETEC FORAMOT/DSKC 1/2 VRT SEG, LUMBAR      L4     CLOSED REDUCTION WRIST Right 1/20/2016    Procedure: CLOSED REDUCTION WRIST;  Surgeon: Mina Brand MD;  Location:  OR      COLONOSCOPY  8/6/2013    Procedure: COMBINED COLONOSCOPY, SINGLE BIOPSY/POLYPECTOMY BY BIOPSY;  COLONOSCOPY with polypectomy using cold forceps.;  Surgeon: Madeline Oviedo MD;  Location:  GI     COLONOSCOPY  8/29/2016    Dr. Oviedo [facility]     COLONOSCOPY N/A 8/29/2016    Procedure: COMBINED COLONOSCOPY, SINGLE OR MULTIPLE BIOPSY/POLYPECTOMY BY BIOPSY;  Surgeon: Madeline Oviedo MD;  Location:  GI     ENDARTERECTOMY CAROTID  5/10/2012    LEFT, WITH EEG ; Surgeon:SELINA HANKS; Location:[facility] OR     ENT SURGERY      T&A as a child     HC CORRECT BUNION,SIMPLE      Right     HC REPAIR ROTATOR CUFF,ACUTE  2000    Right     HC REPAIR ROTATOR CUFF,ACUTE  2002    Left     HYSTERECTOMY, VAGINAL      simple      rt foot hammer toe repair  2005        Social History     Tobacco Use     Smoking status: Former Smoker     Packs/day: 0.50     Years: 3.00     Pack years: 1.50     Types: Cigarettes     Smokeless tobacco: Never Used     Tobacco comment: quit 1958   Substance Use Topics     Alcohol use: Yes     Comment: occ wine     Drug use: No        OBJECTIVE:   /73   Pulse 84   Wt 122 lb (55.3 kg)   LMP  (LMP Unknown)   SpO2 96%   BMI 21.11 kg/m     Body mass index is 21.11 kg/m .     Imaging:     XR LUMBAR SPINE 2-3 VIEWS  11/6/2021 1:43 PM    Findings, per radiologist read, notable for:      Midlumbar levoconvex scoliosis centered at L3 measures 27  degrees. Rotatory component. Normal vertebral body heights. Negative  for fracture. Negative for sagittal subluxation. No pars defects  identified. Severe discogenic changes at L2-3, L3-4 and L4-5 with disc  height loss, endplate sclerosis, vacuum change and marginal  osteophytes. More pronounced along the concavity of the scoliotic  curvature. 9 mm right lateral subluxation of L2 on L3. 10 mm left  lateral subluxation of L4 on L5. Moderate to severe facet DJD in the  mid-lower lumbar spine.    Full radiological report in chart. Imaging was reviewed with with  patient today.     Exam:     Patient appears comfortable, conversational, and in no apparent distress.   Head: Normocephalic, without obvious abnormality, atraumatic, no facial asymmetry.   Eyes: conjunctivae/corneas clear. PERRL, EOM's intact.   Throat: lips, mucosa, and tongue normal; teeth and gums normal.   Neck: supple, symmetrical, trachea midline, no adenopathy and thyroid: not enlarged, symmetric, no tenderness/mass/nodules.   Lungs: clear to auscultation bilaterally.   Heart: regular rate and rhythm.   Abdomen: soft, non-tender; bowel sounds normal; no masses, no organomegaly.   Pulses: 2+ and symmetric.   Skin: Skin color, texture, turgor normal. No rashes or lesions.     CN II-XII grossly intact, alert and appropriate with conversation and following commands.   Gait is non-antalgic. Able to tandem walk. Able to walk on toes and heels without difficulty.   Cervical spine is non tender to palpation. Appropriate range of motion of neck, not concerning for lhermitte's phenomenon.   Bilateral bicep 2/4 and tricep reflexes 1/4. Sensation intact throughout upper extremities.     UE muscle strength  Right  Left    Deltoid  5/5  5/5    Biceps  5/5  5/5    Triceps  5/5  5/5    Hand intrinsics  5/5  5/5    Hand grasp  5/5  5/5    Seg[place]ia signs  neg  neg      Lumbar spine is non tender to palpation.  Intact sensation throughout lower extremities.   Bilateral patellar 2/4 and achilles reflex 1/4. Negative for pain with straight leg raise.     LE muscle strength  Right  Left    Iliopsoas (hip flexion)  5/5  5/5    Quad (knee extension)  5/5  5/5    Hamstring (knee flexion)  5/5  5/5    Gastrocnemius (PF)  5/5  5/5    Tibialis Ant. (DF)  5/5  5/5    EHL  5/5  5/5      Negative Babinski bilaterally. Negative for clonus.   Calves are soft and non-tender bilaterally.     ASSESSMENT/PLAN:     Jennifer Whiteside is a 86 year old female who presents to the clinic for consultation on a two-week history of a daily with  fluctuating intensity, aching, dull pain that initiates in the right lateral low lumbar region and radiates distally to her buttock, nothing more distal. This pain is not accompanied by paresthesia, numbness or perceived weakness. The patient's most recent imaging was reviewed with her today. It was explained that images show severe discogenic changes at L2-3, L3-4 and L4-5 with disc height loss, endplate sclerosis, vacuum change and marginal osteophytes. On exam, she is noted to have appropriate strength, sensation and range of motion. She has attempted conservative management with ice, tylenol and chiropractic care. The ice and tylenol have provided good relief while her recent chiropractic adjustment seem to exacerbate her pain. , without resolution of symptoms.     Based on her physical exam, imaging review, and past treatments, we feel that it would be in her best interest to continue a conservative approach by participating in a physical therapy program. We also discussed obtaining an epidural steroid injection, but she is not interested in an injection. A referral to physical therapy was provided.      We would like to see her back as needed to further discuss possible surgical interventions or other conservative therapies. We also discussed signs of a worsening problem that she should seek being evaluated.        Respectfully,     SUZANNE Rich PA-C  [facility] Neurosurgery  [facility]  Tel: [phone]      Exam, imaging, and plan reviewed by Dr. Chavez.       Again, thank you for allowing me to participate in the care of your patient.        Sincerely,        Dilip Harper PA-C

## 2021-11-26 DIAGNOSIS — R00.0 TACHYCARDIA: ICD-10-CM

## 2021-11-26 DIAGNOSIS — I48.0 PAROXYSMAL ATRIAL FIBRILLATION (H): ICD-10-CM

## 2021-11-29 ENCOUNTER — THERAPY VISIT (OUTPATIENT)
Dept: PHYSICAL THERAPY | Facility: CLINIC | Age: 86
End: 2021-11-29
Attending: PHYSICIAN ASSISTANT
Payer: COMMERCIAL

## 2021-11-29 DIAGNOSIS — M54.16 RIGHT LUMBAR RADICULOPATHY: ICD-10-CM

## 2021-11-29 PROCEDURE — 97162 PT EVAL MOD COMPLEX 30 MIN: CPT | Mod: GP | Performed by: PHYSICAL THERAPIST

## 2021-11-29 PROCEDURE — 97110 THERAPEUTIC EXERCISES: CPT | Mod: GP | Performed by: PHYSICAL THERAPIST

## 2021-11-29 NOTE — PROGRESS NOTES
[facility]    OUTPATIENT Physical Therapy ORTHOPEDIC EVALUATION  PLAN OF TREATMENT FOR OUTPATIENT REHABILITATION  (COMPLETE FOR INITIAL CLAIMS ONLY)  Patient's Last Name, First Name, M.I.  YOB: 1935  Jennifer Whiteside    Provider s Name:  [facility]   Medical Record No.  [id number]   Start of Care Date:  11/29/21   Onset Date:   11/12/21   Type:     _X__PT   ___OT Medical Diagnosis:    Encounter Diagnosis   Name Primary?     Right lumbar radiculopathy         Treatment Diagnosis:  LBP        Goals:     11/29/21 0500   Body Part   Goals listed below are for LB   Goal #1   Goal #1 standing   Previous Functional Level No restrictions   Current Functional Level Minutes patient can stand   Performance level 15-20   STG Target Performance Minutes patient will be able to stand   Performance level 30   Rationale for housekeeping tasks such as vacuuming, bed making, mowing, gardening;for meal preparation;for safe household ambulation   Due date 12/17/21   LTG Target Performance Minutes patient will be able to stand   Performance Level 40   Rationale for safe community ambulation   Due date 01/10/22       Therapy Frequency:  1x/week  Predicted Duration of Therapy Intervention:  4 weeks    Nhan Waters, PT                 I CERTIFY THE NEED FOR THESE SERVICES FURNISHED UNDER        THIS PLAN OF TREATMENT AND WHILE UNDER MY CARE     (Physician attestation of this document indicates review and certification of the therapy plan).                       Certification Date From:  11/29/21   Certification Date To:  02/26/22    Referring Provider:  Dilip Harper    Initial Assessment        See Epic Evaluation SOC Date: 11/29/21

## 2021-11-29 NOTE — PROGRESS NOTES
Physical Therapy Initial Evaluation  Subjective:  Patient is referred with c/o R LBP. Sxs have been present for one month and she has noted significant reduction in pain and is now essentially pain free. Sxs developed after bending and lifting a heavy bag of groceries. She attended one chiropractic session and she noted acute R LBP after STM. She has improved with rest and use of Tylenol. Notes some stiffness in the morning upon waking and at times with prolonged standing. MD visit 11/12/21.    The history is provided by the patient.   Patient Health History  Jennifer Whiteside being seen for lower back pain.     Problem began: 10/25/2021.   Problem occurred: picked up a heavy bag     General health as reported by patient is good.  Pertinent medical history includes: none.     Medical allergies: none.   Surgeries include:  Orthopedic surgery.    Current medications:  Anti-depressants, cardiac medication and high blood pressure medication.       Primary job tasks include:  Other.   Other job/home tasks details: retired.                Therapist Generated HPI Evaluation         Type of problem:  Lumbar.    This is a new condition.  Condition occurred with:  Lifting and bending.  Where condition occurred: in the community.  Patient reports pain:  Lumbar spine right.  Pain is described as aching and is intermittent.  Pain radiates to:  No radiation. Pain is worse in the A.M..  Since onset symptoms are rapidly improving.  Associated symptoms:  Loss of strength. Symptoms are exacerbated by bending, lifting and standing  and relieved by rest and analgesics.      Barriers include:  None as reported by patient.    Patient Health History  Jennifer Whiteside being seen for lower back pain.     Problem began: 10/25/2021.   Problem occurred: picked up a heavy bag     General health as reported by patient is good.  Pertinent medical history includes: none.     Medical allergies: none.   Surgeries include:   Orthopedic surgery.    Current medications:  Anti-depressants, cardiac medication and high blood pressure medication.       Primary job tasks include:  Other.   Other job/home tasks details: retired.                                  Objective:  System    Physical Exam      Javier Lumbar Evaluation    Posture:  Sitting: fair  Standing: fair  Lordosis: Reduced    Correction of Posture: no effect  Other Observations: scoliosis noted  Movement Loss:  Flexion (Flex): min  Extension (EXT): mod  Side Glide R (SG R): major  Side Glide L (SG L): major  Test Movements:      JB: During: no effect  After: no effect  Mechanical Response: no effect  Repeat JB: During: no effect  After: no effect  Mechanical Response: no effect          Conclusion: dysfunction and other (DDD)  Principle of Treatment:    Flexion: JB, FISitting x 10/ 3 x daily      Other: core strengthening                                       ROS    Assessment/Plan:    Patient is a 86 year old female with lumbar complaints.    Patient has the following significant findings with corresponding treatment plan.                Diagnosis 1:  Right LBP  Pain -  self management, education, directional preference exercise and home program  Decreased ROM/flexibility - manual therapy, therapeutic exercise and home program  Impaired muscle performance - neuro re-education and home program  Decreased function - therapeutic activities and home program    Therapy Evaluation Codes:   1) History comprised of:   Personal factors that impact the plan of care:      Age.    Comorbidity factors that impact the plan of care are:      None.     Medications impacting care: Anti-depressant, Cardiac and High blood pressure.  2) Examination of Body Systems comprised of:   Body structures and functions that impact the plan of care:      Lumbar spine.   Activity limitations that impact the plan of care are:      Bending, Lifting and Standing.  3) Clinical presentation characteristics  are:   Evolving/Changing.  4) Decision-Making    Moderate complexity using standardized patient assessment instrument and/or measureable assessment of functional outcome.  Cumulative Therapy Evaluation is: Moderate complexity.    Previous and current functional limitations:  (See Goal Flow Sheet for this information)    Short term and Long term goals: (See Goal Flow Sheet for this information)     Communication ability:  Patient appears to be able to clearly communicate and understand verbal and written communication and follow directions correctly.  Treatment Explanation - The following has been discussed with the patient:   RX ordered/plan of care  Anticipated outcomes  Possible risks and side effects  This patient would benefit from PT intervention to resume normal activities.   Rehab potential is good.    Frequency:  1 X week, once daily  Duration:  for 4 weeks  Discharge Plan:  Achieve all LTG.  Independent in home treatment program.  Reach maximal therapeutic benefit.    Please refer to the daily flowsheet for treatment today, total treatment time and time spent performing 1:1 timed codes.

## 2021-11-30 ENCOUNTER — TRANSFERRED RECORDS (OUTPATIENT)
Dept: HEALTH INFORMATION MANAGEMENT | Facility: CLINIC | Age: 86
End: 2021-11-30
Payer: COMMERCIAL

## 2021-12-06 ENCOUNTER — THERAPY VISIT (OUTPATIENT)
Dept: PHYSICAL THERAPY | Facility: CLINIC | Age: 86
End: 2021-12-06
Payer: COMMERCIAL

## 2021-12-06 DIAGNOSIS — M54.16 RIGHT LUMBAR RADICULOPATHY: Primary | ICD-10-CM

## 2021-12-06 PROCEDURE — 97110 THERAPEUTIC EXERCISES: CPT | Mod: GP | Performed by: PHYSICAL THERAPIST

## 2022-01-11 NOTE — PROGRESS NOTES
Discharge Note    Progress reporting period is from last progress note on   to Dec 6, 2021.    Jennifer failed to follow up and current status is unknown.  Please see information below for last relevant information on current status.  Patient seen for 2 visits.    SUBJECTIVE  Subjective changes noted by patient:  Doing very well with no c/o LBP. HEP going well.  .  Current pain level is 0/10.     Previous pain level was  0/10.   Changes in function:  Yes (See Goal flowsheet attached for changes in current functional level)  Adverse reaction to treatment or activity: None    OBJECTIVE  Changes noted in objective findings: Reviewed HEP and progressed per flow     ASSESSMENT/PLAN  Diagnosis: LBP   Updated problem list and treatment plan:   Pain - HEP  STG/LTGs have been met or progress has been made towards goals:  Yes, please see goal flowsheet for most current information  Assessment of Progress: current status is unknown.    Last current status: Pt is progressing well   Self Management Plans:  HEP  I have re-evaluated this patient and find that the nature, scope, duration and intensity of the therapy is appropriate for the medical condition of the patient.  Jennifer continues to require the following intervention to meet STG and LTG's:  HEP.    Recommendations:  Discharge with current home program.  Patient to follow up with MD as needed.    Please refer to the daily flowsheet for treatment today, total treatment time and time spent performing 1:1 timed codes.

## 2022-02-17 DIAGNOSIS — I48.0 PAROXYSMAL ATRIAL FIBRILLATION (H): ICD-10-CM

## 2022-02-17 DIAGNOSIS — I47.10 SVT (SUPRAVENTRICULAR TACHYCARDIA) (H): ICD-10-CM

## 2022-02-17 RX ORDER — METOPROLOL TARTRATE 25 MG/1
25 TABLET, FILM COATED ORAL 2 TIMES DAILY
Qty: 180 TABLET | Refills: 0 | Status: SHIPPED | OUTPATIENT
Start: 2022-02-17 | End: 2022-03-22

## 2022-02-21 ENCOUNTER — OFFICE VISIT (OUTPATIENT)
Dept: CARDIOLOGY | Facility: CLINIC | Age: 87
End: 2022-02-21
Payer: COMMERCIAL

## 2022-02-21 VITALS
SYSTOLIC BLOOD PRESSURE: 116 MMHG | HEIGHT: 64 IN | BODY MASS INDEX: 21.73 KG/M2 | OXYGEN SATURATION: 97 % | HEART RATE: 85 BPM | WEIGHT: 127.3 LBS | DIASTOLIC BLOOD PRESSURE: 62 MMHG

## 2022-02-21 DIAGNOSIS — I48.0 PAROXYSMAL A-FIB (H): Primary | ICD-10-CM

## 2022-02-21 PROCEDURE — 99214 OFFICE O/P EST MOD 30 MIN: CPT | Performed by: INTERNAL MEDICINE

## 2022-02-21 NOTE — PROGRESS NOTES
HPI and Plan:   See dictation [id number]    Orders Placed This Encounter   Procedures     Follow-Up with Cardiology     Echocardiogram Complete     No orders of the defined types were placed in this encounter.    There are no discontinued medications.      Encounter Diagnosis   Name Primary?     Paroxysmal A-fib (H) Yes       Today's clinic visit entailed:  Review of the result(s) of each unique test - [facility] ECG  25 minutes spent on the date of the encounter doing chart review, review of test results, interpretation of tests, patient visit and documentation   Provider  Link to [facility] Help Grid     The level of medical decision making during this visit was of moderate complexity.      CURRENT MEDICATIONS:  Current Outpatient Medications   Medication Sig Dispense Refill     alendronate (FOSAMAX) 70 MG tablet Take one tablet by mouth once a week with 8 ounces water. 12 tablet 3     apixaban ANTICOAGULANT (ELIQUIS) 2.5 MG tablet Take 1 tablet (2.5 mg) by mouth 2 times daily 180 tablet 0     GARLIC PO Take 1 tablet by mouth daily        latanoprost (XALATAN) 0.005 % ophthalmic solution Place 1 drop into both eyes At Bedtime       metoprolol tartrate (LOPRESSOR) 25 MG tablet Take 1 tablet (25 mg) by mouth 2 times daily 180 tablet 0     Multiple Vitamin (MULTI-VITAMIN) per tablet Take  by mouth. Equaline MVI - 1 daily 100 tablet 3     Omega-3 Fatty Acids (FISH OIL) 1200 MG capsule Take 1 capsule by mouth 2 times daily.       pravastatin (PRAVACHOL) 40 MG tablet Take 1 tablet (40 mg) by mouth daily 90 tablet 3     sertraline (ZOLOFT) 50 MG tablet Take 1 tablet (50 mg) by mouth daily 90 tablet 1     Vitamin D, Cholecalciferol, 25 MCG (1000 UT) TABS Take 25 mcg by mouth daily        polyethylene glycol-propylene glycol (SYSTANE ULTRA) 0.4-0.3 % SOLN ophthalmic solution Place 1 drop into both eyes 2 times daily (Patient not taking: Reported on 2/21/2022)       predniSONE (DELTASONE) 20 MG tablet Take 2 tabs PO daily x 5 days  (Patient not taking: Reported on 2022) 10 tablet 0       ALLERGIES   No Known Allergies    PAST MEDICAL HISTORY:  Past Medical History:   Diagnosis Date     Atrial fibrillation (H) 2012    with RVR.  Occurred POD #2 following carotid enarterectomy.     Depressive disorder      Elevated LFTs     occurred on lipitor; resolved off medications     Esophageal stenosis      Hyperlipidaemia      Hyperlipidemia      Irregular heart beat      New onset atrial fibrillation (H) 2012     Osteoarthrosis      PAD (peripheral artery disease) (H)      PVD (peripheral vascular disease) (H)     s/p carotid enarterectomy 5-10-12     SVT (supraventricular tachycardia) (H) 2020       PAST SURGICAL HISTORY:  Past Surgical History:   Procedure Laterality Date     APPENDECTOMY      appy as a child     CLOSED REDUCTION WRIST Right 2016    Procedure: CLOSED REDUCTION WRIST;  Surgeon: Mina Brand MD;  Location: [facility] OR     COLONOSCOPY  2013    Procedure: COMBINED COLONOSCOPY, SINGLE BIOPSY/POLYPECTOMY BY BIOPSY;  COLONOSCOPY with polypectomy using cold forceps.;  Surgeon: Madeline Oviedo MD;  Location:  GI     COLONOSCOPY  2016    Dr. Oviedo [facility]     COLONOSCOPY N/A 2016    Procedure: COMBINED COLONOSCOPY, SINGLE OR MULTIPLE BIOPSY/POLYPECTOMY BY BIOPSY;  Surgeon: Madeline Oviedo MD;  Location:  GI     ENDARTERECTOMY CAROTID  5/10/2012    LEFT, WITH EEG ; Surgeon:SELINA HANKS; Location: OR     ENT SURGERY      T&A as a child     HC CORRECT BUNION,SIMPLE      Right     HC REPAIR ROTATOR CUFF,ACUTE      Right     HC REPAIR ROTATOR CUFF,ACUTE      Left     HYSTERECTOMY, VAGINAL      simple      rt foot hammer toe repair       ZZC HILLIARD W/O FACETEC FORAMOT/[facility]  VRT SEG, LUMBAR      L4       FAMILY HISTORY:  Family History   Problem Relation Age of Onset     Diabetes Brother      Diabetes Brother      C.A.D. Father          52 yo Cerebral hemorrhage     Hypertension  Mother          84 yo     Arthritis Sister      Colon Cancer Sister      Family History Negative Son         Rheumatic fever     Family History Negative Son      Family History Negative Son      Family History Negative Daughter      Hypertension Daughter      Gynecology Daughter         D & C with issues uncertain       SOCIAL HISTORY:  Social History     Socioeconomic History     Marital status:      Spouse name: Not on file     Number of children: 5     Years of education: Not on file     Highest education level: Not on file   Occupational History     Employer: RETIRED   Tobacco Use     Smoking status: Former Smoker     Packs/day: 0.50     Years: 3.00     Pack years: 1.50     Types: Cigarettes     Smokeless tobacco: Never Used     Tobacco comment: quit    Substance and Sexual Activity     Alcohol use: Yes     Comment: occ wine     Drug use: No     Sexual activity: Never   Other Topics Concern      Service Not Asked     Blood Transfusions Not Asked     Caffeine Concern No     Occupational Exposure Not Asked     Hobby Hazards Not Asked     Sleep Concern Not Asked     Stress Concern Not Asked     Weight Concern Not Asked     Special Diet Not Asked     Back Care Not Asked     Exercise Yes     Bike Helmet Yes     Comment: motorcycle     Seat Belt Yes     Self-Exams Yes     Parent/sibling w/ CABG, MI or angioplasty before 65F 55M? No   Social History Narrative     ;     Enjoys traveling, motorcycling    5 children, 3 in metro area.     Social Determinants of Health     Financial Resource Strain: Not on file   Food Insecurity: Not on file   Transportation Needs: Not on file   Physical Activity: Not on file   Stress: Not on file   Social Connections: Not on file   Intimate Partner Violence: Not on file   Housing Stability: Not on file       Review of Systems:  Skin:  Negative     Eyes:  Positive for glaucoma;glasses  ENT:  Positive for hearing loss  Respiratory:  Positive for dyspnea  "on exertion  Cardiovascular:    lightheadedness;Positive for;palpitations;fatigue;dizziness  Gastroenterology: Negative    Genitourinary:  Negative    Musculoskeletal:  Negative    Neurologic:  Positive for numbness or tingling of feet  Psychiatric:  Negative    Heme/Lymph/Imm:  Negative    Endocrine:  Negative      Physical Exam:  Vitals: /62 (BP Location: Right arm, Patient Position: Sitting, Cuff Size: Adult Regular)   Pulse 85   Ht 1.619 m (5' 3.75\")   Wt 57.7 kg (127 lb 4.8 oz)   LMP  (LMP Unknown)   SpO2 97%   BMI 22.02 kg/m        Recent Lab Results:  LIPID RESULTS:  Lab Results   Component Value Date    CHOL 161 04/23/2021    HDL 73 04/23/2021    LDL 76 04/23/2021    TRIG 60 04/23/2021    CHOLHDLRATIO 2.6 02/09/2015       LIVER ENZYME RESULTS:  Lab Results   Component Value Date    AST 10 08/18/2021    AST 15 12/23/2019    ALT 18 08/18/2021    ALT 18 12/23/2019       CBC RESULTS:  Lab Results   Component Value Date    WBC 6.0 08/18/2021    WBC 6.1 11/29/2020    RBC 3.65 (L) 08/18/2021    RBC 3.75 (L) 11/29/2020    HGB 11.8 08/18/2021    HGB 12.5 04/23/2021    HCT 35.7 08/18/2021    HCT 37.6 11/29/2020    MCV 98 08/18/2021     11/29/2020    MCH 32.3 08/18/2021    MCH 32.8 11/29/2020    MCHC 33.1 08/18/2021    MCHC 32.7 11/29/2020    RDW 11.4 08/18/2021    RDW 11.9 11/29/2020     08/18/2021     11/29/2020       BMP RESULTS:  Lab Results   Component Value Date     08/18/2021     (L) 04/23/2021    POTASSIUM 4.3 08/18/2021    POTASSIUM 4.3 04/23/2021    CHLORIDE 103 08/18/2021    CHLORIDE 98 04/23/2021    CO2 28 08/18/2021    CO2 29 04/23/2021    ANIONGAP 4 08/18/2021    ANIONGAP 4 04/23/2021     (H) 08/18/2021    GLC 95 04/23/2021    BUN 10 08/18/2021    BUN 11 04/23/2021    CR 0.60 08/18/2021    CR 0.68 04/23/2021    GFRESTIMATED 83 08/18/2021    GFRESTIMATED 80 04/23/2021    GFRESTBLACK >90 04/23/2021    MABEL 8.9 08/18/2021    MABEL 9.1 04/23/2021        A1C " RESULTS:  Lab Results   Component Value Date    A1C 5.3 05/10/2012       INR RESULTS:  Lab Results   Component Value Date    INR 1.6 (A) 07/06/2012    INR 2.1 (A) 06/27/2012    INR 0.98 05/12/2012    INR 0.96 05/10/2012           CC  No referring provider defined for this encounter.

## 2022-02-21 NOTE — LETTER
2/21/2022       RE: Jennifer Law Stevo  [id number]  [facility] [address]     Dear Colleague,    Thank you for referring your patient, Jennifer Whiteside, to the [facility]. Please see a copy of my visit note below.    HPI and Plan:   See dictation [id number]    Orders Placed This Encounter   Procedures     Follow-Up with Cardiology     Echocardiogram Complete     No orders of the defined types were placed in this encounter.    There are no discontinued medications.      Encounter Diagnosis   Name Primary?     Paroxysmal A-fib (H) Yes       Today's clinic visit entailed:  Review of the result(s) of each unique test - [facility] ECG  25 minutes spent on the date of the encounter doing chart review, review of test results, interpretation of tests, patient visit and documentation   Provider  Link to [facility] Help Grid     The level of medical decision making during this visit was of moderate complexity.      CURRENT MEDICATIONS:  Current Outpatient Medications   Medication Sig Dispense Refill     alendronate (FOSAMAX) 70 MG tablet Take one tablet by mouth once a week with 8 ounces water. 12 tablet 3     apixaban ANTICOAGULANT (ELIQUIS) 2.5 MG tablet Take 1 tablet (2.5 mg) by mouth 2 times daily 180 tablet 0     GARLIC PO Take 1 tablet by mouth daily        latanoprost (XALATAN) 0.005 % ophthalmic solution Place 1 drop into both eyes At Bedtime       metoprolol tartrate (LOPRESSOR) 25 MG tablet Take 1 tablet (25 mg) by mouth 2 times daily 180 tablet 0     Multiple Vitamin (MULTI-VITAMIN) per tablet Take  by mouth. Equaline MVI - 1 daily 100 tablet 3     Omega-3 Fatty Acids (FISH OIL) 1200 MG capsule Take 1 capsule by mouth 2 times daily.       pravastatin (PRAVACHOL) 40 MG tablet Take 1 tablet (40 mg) by mouth daily 90 tablet 3     sertraline (ZOLOFT) 50 MG tablet Take 1 tablet (50 mg) by  mouth daily 90 tablet 1     Vitamin D, Cholecalciferol, 25 MCG (1000 UT) TABS Take 25 mcg by mouth daily        polyethylene glycol-propylene glycol (SYSTANE ULTRA) 0.4-0.3 % SOLN ophthalmic solution Place 1 drop into both eyes 2 times daily (Patient not taking: Reported on 2/21/2022)       predniSONE (DELTASONE) 20 MG tablet Take 2 tabs PO daily x 5 days (Patient not taking: Reported on 2/21/2022) 10 tablet 0       ALLERGIES   No Known Allergies    PAST MEDICAL HISTORY:  Past Medical History:   Diagnosis Date     Atrial fibrillation (H) 5/2012    with RVR.  Occurred POD #2 following carotid enarterectomy.     Depressive disorder      Elevated LFTs     occurred on lipitor; resolved off medications     Esophageal stenosis      Hyperlipidaemia      Hyperlipidemia      Irregular heart beat      New onset atrial fibrillation (H) 5/13/2012     Osteoarthrosis      PAD (peripheral artery disease) (H)      PVD (peripheral vascular disease) (H)     s/p carotid enarterectomy 5-10-12     SVT (supraventricular tachycardia) (H) 4/21/2020       PAST SURGICAL HISTORY:  Past Surgical History:   Procedure Laterality Date     APPENDECTOMY      appy as a child     CLOSED REDUCTION WRIST Right 1/20/2016    Procedure: CLOSED REDUCTION WRIST;  Surgeon: Mina Brand MD;  Location:  OR     COLONOSCOPY  8/6/2013    Procedure: COMBINED COLONOSCOPY, SINGLE BIOPSY/POLYPECTOMY BY BIOPSY;  COLONOSCOPY with polypectomy using cold forceps.;  Surgeon: Madeline Oviedo MD;  Location:  GI     COLONOSCOPY  8/29/2016    Dr. Oviedo [facility]     COLONOSCOPY N/A 8/29/2016    Procedure: COMBINED COLONOSCOPY, SINGLE OR MULTIPLE BIOPSY/POLYPECTOMY BY BIOPSY;  Surgeon: Madeline Oviedo MD;  Location:  GI     ENDARTERECTOMY CAROTID  5/10/2012    LEFT, WITH EEG ; Surgeon:SELINA HANKS; Location: OR     ENT SURGERY      T&A as a child     HC CORRECT BUNION,SIMPLE      Right     HC REPAIR ROTATOR CUFF,ACUTE  2000    Right     HC REPAIR ROTATOR  CUFF,ACUTE      Left     HYSTERECTOMY, VAGINAL      simple      rt foot hammer toe repair       ZZC HILLIARD W/O FACETEC FORAMOT/[facility]  VRT SEG, LUMBAR      L4       FAMILY HISTORY:  Family History   Problem Relation Age of Onset     Diabetes Brother      Diabetes Brother      C.A.D. Father          54 yo Cerebral hemorrhage     Hypertension Mother          86 yo     Arthritis Sister      Colon Cancer Sister      Family History Negative Son         Rheumatic fever     Family History Negative Son      Family History Negative Son      Family History Negative Daughter      Hypertension Daughter      Gynecology Daughter         D & C with issues uncertain       SOCIAL HISTORY:  Social History     Socioeconomic History     Marital status:      Spouse name: Not on file     Number of children: 5     Years of education: Not on file     Highest education level: Not on file   Occupational History     Employer: RETIRED   Tobacco Use     Smoking status: Former Smoker     Packs/day: 0.50     Years: 3.00     Pack years: 1.50     Types: Cigarettes     Smokeless tobacco: Never Used     Tobacco comment: quit    Substance and Sexual Activity     Alcohol use: Yes     Comment: occ wine     Drug use: No     Sexual activity: Never   Other Topics Concern      Service Not Asked     Blood Transfusions Not Asked     Caffeine Concern No     Occupational Exposure Not Asked     Hobby Hazards Not Asked     Sleep Concern Not Asked     Stress Concern Not Asked     Weight Concern Not Asked     Special Diet Not Asked     Back Care Not Asked     Exercise Yes     Bike Helmet Yes     Comment: motorcycle     Seat Belt Yes     Self-Exams Yes     Parent/sibling w/ CABG, MI or angioplasty before 65F 55M? No   Social History Narrative     ;     Enjoys traveling, motorcycling    5 children, 3 in metro area.     Social Determinants of Health     Financial Resource Strain: Not on file   Food Insecurity: Not on  "file   Transportation Needs: Not on file   Physical Activity: Not on file   Stress: Not on file   Social Connections: Not on file   Intimate Partner Violence: Not on file   Housing Stability: Not on file       Review of Systems:  Skin:  Negative     Eyes:  Positive for glaucoma;glasses  ENT:  Positive for hearing loss  Respiratory:  Positive for dyspnea on exertion  Cardiovascular:    lightheadedness;Positive for;palpitations;fatigue;dizziness  Gastroenterology: Negative    Genitourinary:  Negative    Musculoskeletal:  Negative    Neurologic:  Positive for numbness or tingling of feet  Psychiatric:  Negative    Heme/Lymph/Imm:  Negative    Endocrine:  Negative      Physical Exam:  Vitals: /62 (BP Location: Right arm, Patient Position: Sitting, Cuff Size: Adult Regular)   Pulse 85   Ht 1.619 m (5' 3.75\")   Wt 57.7 kg (127 lb 4.8 oz)   LMP  (LMP Unknown)   SpO2 97%   BMI 22.02 kg/m        Recent Lab Results:  LIPID RESULTS:  Lab Results   Component Value Date    CHOL 161 04/23/2021    HDL 73 04/23/2021    LDL 76 04/23/2021    TRIG 60 04/23/2021    CHOLHDLRATIO 2.6 02/09/2015       LIVER ENZYME RESULTS:  Lab Results   Component Value Date    AST 10 08/18/2021    AST 15 12/23/2019    ALT 18 08/18/2021    ALT 18 12/23/2019       CBC RESULTS:  Lab Results   Component Value Date    WBC 6.0 08/18/2021    WBC 6.1 11/29/2020    RBC 3.65 (L) 08/18/2021    RBC 3.75 (L) 11/29/2020    HGB 11.8 08/18/2021    HGB 12.5 04/23/2021    HCT 35.7 08/18/2021    HCT 37.6 11/29/2020    MCV 98 08/18/2021     11/29/2020    MCH 32.3 08/18/2021    MCH 32.8 11/29/2020    MCHC 33.1 08/18/2021    MCHC 32.7 11/29/2020    RDW 11.4 08/18/2021    RDW 11.9 11/29/2020     08/18/2021     11/29/2020       BMP RESULTS:  Lab Results   Component Value Date     08/18/2021     (L) 04/23/2021    POTASSIUM 4.3 08/18/2021    POTASSIUM 4.3 04/23/2021    CHLORIDE 103 08/18/2021    CHLORIDE 98 04/23/2021    CO2 28 " 08/18/2021    CO2 29 04/23/2021    ANIONGAP 4 08/18/2021    ANIONGAP 4 04/23/2021     (H) 08/18/2021    GLC 95 04/23/2021    BUN 10 08/18/2021    BUN 11 04/23/2021    CR 0.60 08/18/2021    CR 0.68 04/23/2021    GFRESTIMATED 83 08/18/2021    GFRESTIMATED 80 04/23/2021    GFRESTBLACK >90 04/23/2021    MABEL 8.9 08/18/2021    MABEL 9.1 04/23/2021        A1C RESULTS:  Lab Results   Component Value Date    A1C 5.3 05/10/2012       INR RESULTS:  Lab Results   Component Value Date    INR 1.6 (A) 07/06/2012    INR 2.1 (A) 06/27/2012    INR 0.98 05/12/2012    INR 0.96 05/10/2012           CC  No referring provider defined for this encounter.                    Service Date: 02/21/2022    REASON FOR VISIT:  Atrial fibrillation.    HISTORY OF PRESENT ILLNESS:  This is a delightful 87-year-old female who has been seeing me since 04/2020 after an ED visit.  She has a  history of paroxysmal atrial fibrillation diagnosed at that time.  Subsequently, has been on metoprolol and Eliquis.  Echocardiograms in the past have shown normal biventricular size and function.  She has a couple of nuclear stress tests for atypical symptoms, last being in 2021 that has been negative for ischemia or infarction.  No other pertinent cardiac history.  She remains on low-dose Eliquis given her age, fall risk and body weight.    Today, Keiko is here for routine followup.  She denies any new cardiovascular symptoms.  She said about a month ago, she had an episode of irregular fast heart rhythm that lasted for 5-7 minutes and spontaneously terminated.  She was a little lightheaded with that, but no syncope, presyncope.  Since then, no other recurrence of those symptoms.  No major cardiovascular complaints today except for some fatigue that has been slowly progressively getting worse and she attributes that to age.    PHYSICAL EXAMINATION:    VITAL SIGNS:  Blood pressure 116/62 with a pulse of 85 and regular.  GENERAL:  Alert, oriented x3, in no acute  distress.  NECK:  Supple.  JVP normal.  LUNGS:  Clear.  HEART:  Sounds are regular.  There is a soft systolic murmur peaking in mid systole, but S2 was quite notable.  PSYCHIATRIC:  Appropriate affect.  HEENT:  No icterus or pallor.    PERTINENT DATA:  Last hemoglobin 11.8, stable, creatinine and potassium have been normal as of last year.  Last ECG, sinus rhythm.  Last nuclear stress test negative for ischemia in 2021.  Last echocardiogram, normal LV and RV function in 2020.    ASSESSMENT AND PLAN:  Keiko is doing well overall from a cardiac standpoint.  She has paroxysmal AFib; probably her last episode of AFib with nonsustained as well over a month ago.  Today, she is in sinus rhythm.  Her only issue is mild progressive exertional fatigue, which I am attributing that to age as well.  On exam, she has a pretty reasonable murmur concerning probably from mild aortic stenosis.  S2 is pretty audible.  Would recommend getting an echocardiogram to evaluate that.      Otherwise, assuming things are stable, I will probably see her back in about a year in clinic for routine followup, sooner if anything changes.  She denies any bleeding problems and she is going to continue Eliquis in addition to metoprolol for paroxysmal.    cc:   Carisa Waggoner MD   [facility]  [address]  [place] [address]     Lucius Truong MD        D: 2022   T: 2022   MT: DARIAN    Name:     LUCY PERRY  MRN:      [id number]        Account:      [id number]   :      1935           Service Date: 2022     Document: [id number]

## 2022-02-21 NOTE — LETTER
2/21/2022    Carisa Waggoner MD  [address] [facility] [address]  [facility] [address]    RE: Jennifer Whiteside       Dear Colleague,     I had the pleasure of seeing Jennifer Whiteside in the [facility].  HPI and Plan:   See dictation [id number]    Orders Placed This Encounter   Procedures     Follow-Up with Cardiology     Echocardiogram Complete     No orders of the defined types were placed in this encounter.    There are no discontinued medications.      Encounter Diagnosis   Name Primary?     Paroxysmal A-fib (H) Yes       Today's clinic visit entailed:  Review of the result(s) of each unique test - [facility] ECG  25 minutes spent on the date of the encounter doing chart review, review of test results, interpretation of tests, patient visit and documentation   Provider  Link to [facility] Help Grid     The level of medical decision making during this visit was of moderate complexity.      CURRENT MEDICATIONS:  Current Outpatient Medications   Medication Sig Dispense Refill     alendronate (FOSAMAX) 70 MG tablet Take one tablet by mouth once a week with 8 ounces water. 12 tablet 3     apixaban ANTICOAGULANT (ELIQUIS) 2.5 MG tablet Take 1 tablet (2.5 mg) by mouth 2 times daily 180 tablet 0     GARLIC PO Take 1 tablet by mouth daily        latanoprost (XALATAN) 0.005 % ophthalmic solution Place 1 drop into both eyes At Bedtime       metoprolol tartrate (LOPRESSOR) 25 MG tablet Take 1 tablet (25 mg) by mouth 2 times daily 180 tablet 0     Multiple Vitamin (MULTI-VITAMIN) per tablet Take  by mouth. Equaline MVI - 1 daily 100 tablet 3     Omega-3 Fatty Acids (FISH OIL) 1200 MG capsule Take 1 capsule by mouth 2 times daily.       pravastatin (PRAVACHOL) 40 MG tablet Take 1 tablet (40 mg) by mouth daily 90 tablet 3     sertraline (ZOLOFT) 50 MG tablet Take 1 tablet (50 mg) by mouth daily 90 tablet 1     Vitamin D, Cholecalciferol, 25 MCG (1000 UT) TABS Take 25 mcg by mouth daily        polyethylene  glycol-propylene glycol (SYSTANE ULTRA) 0.4-0.3 % SOLN ophthalmic solution Place 1 drop into both eyes 2 times daily (Patient not taking: Reported on 2/21/2022)       predniSONE (DELTASONE) 20 MG tablet Take 2 tabs PO daily x 5 days (Patient not taking: Reported on 2/21/2022) 10 tablet 0       ALLERGIES   No Known Allergies    PAST MEDICAL HISTORY:  Past Medical History:   Diagnosis Date     Atrial fibrillation (H) 5/2012    with RVR.  Occurred POD #2 following carotid enarterectomy.     Depressive disorder      Elevated LFTs     occurred on lipitor; resolved off medications     Esophageal stenosis      Hyperlipidaemia      Hyperlipidemia      Irregular heart beat      New onset atrial fibrillation (H) 5/13/2012     Osteoarthrosis      PAD (peripheral artery disease) (H)      PVD (peripheral vascular disease) (H)     s/p carotid enarterectomy 5-10-12     SVT (supraventricular tachycardia) (H) 4/21/2020       PAST SURGICAL HISTORY:  Past Surgical History:   Procedure Laterality Date     APPENDECTOMY      appy as a child     CLOSED REDUCTION WRIST Right 1/20/2016    Procedure: CLOSED REDUCTION WRIST;  Surgeon: Mina Brand MD;  Location:  OR     COLONOSCOPY  8/6/2013    Procedure: COMBINED COLONOSCOPY, SINGLE BIOPSY/POLYPECTOMY BY BIOPSY;  COLONOSCOPY with polypectomy using cold forceps.;  Surgeon: Madeline Oviedo MD;  Location:  GI     COLONOSCOPY  8/29/2016    Dr. Oviedo [facility]     COLONOSCOPY N/A 8/29/2016    Procedure: COMBINED COLONOSCOPY, SINGLE OR MULTIPLE BIOPSY/POLYPECTOMY BY BIOPSY;  Surgeon: Madeline Oviedo MD;  Location:  GI     ENDARTERECTOMY CAROTID  5/10/2012    LEFT, WITH EEG ; Surgeon:SELINA HANKS; Location: OR     ENT SURGERY      T&A as a child     HC CORRECT BUNION,SIMPLE      Right     HC REPAIR ROTATOR CUFF,ACUTE  2000    Right     HC REPAIR ROTATOR CUFF,ACUTE  2002    Left     HYSTERECTOMY, VAGINAL      simple      rt foot hammer toe repair  2005     ZZC HILLIARD W/O FACETEC  HERRERA/DONNA 1/2 VRT SEG, LUMBAR      L4       FAMILY HISTORY:  Family History   Problem Relation Age of Onset     Diabetes Brother      Diabetes Brother      C.A.D. Father          52 yo Cerebral hemorrhage     Hypertension Mother          86 yo     Arthritis Sister      Colon Cancer Sister      Family History Negative Son         Rheumatic fever     Family History Negative Son      Family History Negative Son      Family History Negative Daughter      Hypertension Daughter      Gynecology Daughter         D & C with issues uncertain       SOCIAL HISTORY:  Social History     Socioeconomic History     Marital status:      Spouse name: Not on file     Number of children: 5     Years of education: Not on file     Highest education level: Not on file   Occupational History     Employer: RETIRED   Tobacco Use     Smoking status: Former Smoker     Packs/day: 0.50     Years: 3.00     Pack years: 1.50     Types: Cigarettes     Smokeless tobacco: Never Used     Tobacco comment: quit    Substance and Sexual Activity     Alcohol use: Yes     Comment: occ wine     Drug use: No     Sexual activity: Never   Other Topics Concern      Service Not Asked     Blood Transfusions Not Asked     Caffeine Concern No     Occupational Exposure Not Asked     Hobby Hazards Not Asked     Sleep Concern Not Asked     Stress Concern Not Asked     Weight Concern Not Asked     Special Diet Not Asked     Back Care Not Asked     Exercise Yes     Bike Helmet Yes     Comment: motorcycle     Seat Belt Yes     Self-Exams Yes     Parent/sibling w/ CABG, MI or angioplasty before 65F 55M? No   Social History Narrative     ;     Enjoys traveling, motorcycling    5 children, 3 in metro area.     Social Determinants of Health     Financial Resource Strain: Not on file   Food Insecurity: Not on file   Transportation Needs: Not on file   Physical Activity: Not on file   Stress: Not on file   Social Connections: Not  "on file   Intimate Partner Violence: Not on file   Housing Stability: Not on file       Review of Systems:  Skin:  Negative     Eyes:  Positive for glaucoma;glasses  ENT:  Positive for hearing loss  Respiratory:  Positive for dyspnea on exertion  Cardiovascular:    lightheadedness;Positive for;palpitations;fatigue;dizziness  Gastroenterology: Negative    Genitourinary:  Negative    Musculoskeletal:  Negative    Neurologic:  Positive for numbness or tingling of feet  Psychiatric:  Negative    Heme/Lymph/Imm:  Negative    Endocrine:  Negative      Physical Exam:  Vitals: /62 (BP Location: Right arm, Patient Position: Sitting, Cuff Size: Adult Regular)   Pulse 85   Ht 1.619 m (5' 3.75\")   Wt 57.7 kg (127 lb 4.8 oz)   LMP  (LMP Unknown)   SpO2 97%   BMI 22.02 kg/m        Recent Lab Results:  LIPID RESULTS:  Lab Results   Component Value Date    CHOL 161 04/23/2021    HDL 73 04/23/2021    LDL 76 04/23/2021    TRIG 60 04/23/2021    CHOLHDLRATIO 2.6 02/09/2015       LIVER ENZYME RESULTS:  Lab Results   Component Value Date    AST 10 08/18/2021    AST 15 12/23/2019    ALT 18 08/18/2021    ALT 18 12/23/2019       CBC RESULTS:  Lab Results   Component Value Date    WBC 6.0 08/18/2021    WBC 6.1 11/29/2020    RBC 3.65 (L) 08/18/2021    RBC 3.75 (L) 11/29/2020    HGB 11.8 08/18/2021    HGB 12.5 04/23/2021    HCT 35.7 08/18/2021    HCT 37.6 11/29/2020    MCV 98 08/18/2021     11/29/2020    MCH 32.3 08/18/2021    MCH 32.8 11/29/2020    MCHC 33.1 08/18/2021    MCHC 32.7 11/29/2020    RDW 11.4 08/18/2021    RDW 11.9 11/29/2020     08/18/2021     11/29/2020       BMP RESULTS:  Lab Results   Component Value Date     08/18/2021     (L) 04/23/2021    POTASSIUM 4.3 08/18/2021    POTASSIUM 4.3 04/23/2021    CHLORIDE 103 08/18/2021    CHLORIDE 98 04/23/2021    CO2 28 08/18/2021    CO2 29 04/23/2021    ANIONGAP 4 08/18/2021    ANIONGAP 4 04/23/2021     (H) 08/18/2021    GLC 95 04/23/2021 "    BUN 10 08/18/2021    BUN 11 04/23/2021    CR 0.60 08/18/2021    CR 0.68 04/23/2021    GFRESTIMATED 83 08/18/2021    GFRESTIMATED 80 04/23/2021    GFRESTBLACK >90 04/23/2021    MABEL 8.9 08/18/2021    MABEL 9.1 04/23/2021        A1C RESULTS:  Lab Results   Component Value Date    A1C 5.3 05/10/2012       INR RESULTS:  Lab Results   Component Value Date    INR 1.6 (A) 07/06/2012    INR 2.1 (A) 06/27/2012    INR 0.98 05/12/2012    INR 0.96 05/10/2012           CC  No referring provider defined for this encounter.                      Thank you for allowing me to participate in the care of your patient.      Sincerely,     Lucius Truong MD     [facility]  cc:   No referring provider defined for this encounter.

## 2022-02-21 NOTE — PROGRESS NOTES
Service Date: 02/21/2022    REASON FOR VISIT:  Atrial fibrillation.    HISTORY OF PRESENT ILLNESS:  This is a delightful 87-year-old female who has been seeing me since 04/2020 after an ED visit.  She has a  history of paroxysmal atrial fibrillation diagnosed at that time.  Subsequently, has been on metoprolol and Eliquis.  Echocardiograms in the past have shown normal biventricular size and function.  She has a couple of nuclear stress tests for atypical symptoms, last being in 2021 that has been negative for ischemia or infarction.  No other pertinent cardiac history.  She remains on low-dose Eliquis given her age, fall risk and body weight.    Today, Keiko is here for routine followup.  She denies any new cardiovascular symptoms.  She said about a month ago, she had an episode of irregular fast heart rhythm that lasted for 5-7 minutes and spontaneously terminated.  She was a little lightheaded with that, but no syncope, presyncope.  Since then, no other recurrence of those symptoms.  No major cardiovascular complaints today except for some fatigue that has been slowly progressively getting worse and she attributes that to age.    PHYSICAL EXAMINATION:    VITAL SIGNS:  Blood pressure 116/62 with a pulse of 85 and regular.  GENERAL:  Alert, oriented x3, in no acute distress.  NECK:  Supple.  JVP normal.  LUNGS:  Clear.  HEART:  Sounds are regular.  There is a soft systolic murmur peaking in mid systole, but S2 was quite notable.  PSYCHIATRIC:  Appropriate affect.  HEENT:  No icterus or pallor.    PERTINENT DATA:  Last hemoglobin 11.8, stable, creatinine and potassium have been normal as of last year.  Last ECG, sinus rhythm.  Last nuclear stress test negative for ischemia in 08/2021.  Last echocardiogram, normal LV and RV function in 05/2020.    ASSESSMENT AND PLAN:  Keiko is doing well overall from a cardiac standpoint.  She has paroxysmal AFib; probably her last episode of AFib with nonsustained as well over a  month ago.  Today, she is in sinus rhythm.  Her only issue is mild progressive exertional fatigue, which I am attributing that to age as well.  On exam, she has a pretty reasonable murmur concerning probably from mild aortic stenosis.  S2 is pretty audible.  Would recommend getting an echocardiogram to evaluate that.      Otherwise, assuming things are stable, I will probably see her back in about a year in clinic for routine followup, sooner if anything changes.  She denies any bleeding problems and she is going to continue Eliquis in addition to metoprolol for paroxysmal.    cc:   Carisa Waggoner MD   [facility]  [address]  [place] [address]     Lucius Truong MD        D: 2022   T: 2022   MT: DARIAN    Name:     LUCY PERRY  MRN:      [id number]        Account:      [id number]   :      1935           Service Date: 2022       Document: [id number]

## 2022-02-25 ENCOUNTER — HOSPITAL ENCOUNTER (EMERGENCY)
Facility: CLINIC | Age: 87
Discharge: HOME | End: 2022-02-25
Attending: EMERGENCY MEDICINE | Admitting: EMERGENCY MEDICINE
Payer: COMMERCIAL

## 2022-02-25 ENCOUNTER — APPOINTMENT (OUTPATIENT)
Dept: CT IMAGING | Facility: CLINIC | Age: 87
End: 2022-02-25
Attending: EMERGENCY MEDICINE
Payer: COMMERCIAL

## 2022-02-25 ENCOUNTER — APPOINTMENT (OUTPATIENT)
Dept: MRI IMAGING | Facility: CLINIC | Age: 87
End: 2022-02-25
Attending: EMERGENCY MEDICINE
Payer: COMMERCIAL

## 2022-02-25 ENCOUNTER — APPOINTMENT (OUTPATIENT)
Dept: GENERAL RADIOLOGY | Facility: CLINIC | Age: 87
End: 2022-02-25
Attending: EMERGENCY MEDICINE
Payer: COMMERCIAL

## 2022-02-25 VITALS
WEIGHT: 123 LBS | OXYGEN SATURATION: 100 % | TEMPERATURE: 97.6 F | DIASTOLIC BLOOD PRESSURE: 91 MMHG | SYSTOLIC BLOOD PRESSURE: 182 MMHG | HEART RATE: 82 BPM | RESPIRATION RATE: 18 BRPM | BODY MASS INDEX: 21.28 KG/M2

## 2022-02-25 DIAGNOSIS — R07.9 CHEST PAIN, UNSPECIFIED TYPE: ICD-10-CM

## 2022-02-25 DIAGNOSIS — R42 VERTIGO: ICD-10-CM

## 2022-02-25 LAB
ANION GAP SERPL CALCULATED.3IONS-SCNC: 7 MMOL/L (ref 3–14)
ATRIAL RATE - MUSE: 90 BPM
BASOPHILS # BLD AUTO: 0 10E3/UL (ref 0–0.2)
BASOPHILS NFR BLD AUTO: 1 %
BUN SERPL-MCNC: 17 MG/DL (ref 7–30)
CALCIUM SERPL-MCNC: 8.8 MG/DL (ref 8.5–10.1)
CHLORIDE BLD-SCNC: 104 MMOL/L (ref 94–109)
CO2 SERPL-SCNC: 26 MMOL/L (ref 20–32)
CREAT SERPL-MCNC: 0.82 MG/DL (ref 0.52–1.04)
DIASTOLIC BLOOD PRESSURE - MUSE: NORMAL MMHG
EOSINOPHIL # BLD AUTO: 0 10E3/UL (ref 0–0.7)
EOSINOPHIL NFR BLD AUTO: 0 %
ERYTHROCYTE [DISTWIDTH] IN BLOOD BY AUTOMATED COUNT: 11.9 % (ref 10–15)
GFR SERPL CREATININE-BSD FRML MDRD: 69 ML/MIN/1.73M2
GLUCOSE BLD-MCNC: 96 MG/DL (ref 70–99)
HCT VFR BLD AUTO: 37.2 % (ref 35–47)
HGB BLD-MCNC: 12.1 G/DL (ref 11.7–15.7)
HOLD SPECIMEN: NORMAL
IMM GRANULOCYTES # BLD: 0 10E3/UL
IMM GRANULOCYTES NFR BLD: 0 %
INTERPRETATION ECG - MUSE: NORMAL
LYMPHOCYTES # BLD AUTO: 1.1 10E3/UL (ref 0.8–5.3)
LYMPHOCYTES NFR BLD AUTO: 18 %
MCH RBC QN AUTO: 32.7 PG (ref 26.5–33)
MCHC RBC AUTO-ENTMCNC: 32.5 G/DL (ref 31.5–36.5)
MCV RBC AUTO: 101 FL (ref 78–100)
MONOCYTES # BLD AUTO: 0.6 10E3/UL (ref 0–1.3)
MONOCYTES NFR BLD AUTO: 9 %
NEUTROPHILS # BLD AUTO: 4.3 10E3/UL (ref 1.6–8.3)
NEUTROPHILS NFR BLD AUTO: 72 %
NRBC # BLD AUTO: 0 10E3/UL
NRBC BLD AUTO-RTO: 0 /100
P AXIS - MUSE: 47 DEGREES
PLATELET # BLD AUTO: 234 10E3/UL (ref 150–450)
POTASSIUM BLD-SCNC: 3.8 MMOL/L (ref 3.4–5.3)
PR INTERVAL - MUSE: 186 MS
QRS DURATION - MUSE: 80 MS
QT - MUSE: 368 MS
QTC - MUSE: 450 MS
R AXIS - MUSE: -16 DEGREES
RBC # BLD AUTO: 3.7 10E6/UL (ref 3.8–5.2)
SODIUM SERPL-SCNC: 137 MMOL/L (ref 133–144)
SYSTOLIC BLOOD PRESSURE - MUSE: NORMAL MMHG
T AXIS - MUSE: 56 DEGREES
TROPONIN I SERPL HS-MCNC: 11 NG/L
TROPONIN I SERPL HS-MCNC: 18 NG/L
VENTRICULAR RATE- MUSE: 90 BPM
WBC # BLD AUTO: 6 10E3/UL (ref 4–11)

## 2022-02-25 PROCEDURE — A9585 GADOBUTROL INJECTION: HCPCS | Performed by: EMERGENCY MEDICINE

## 2022-02-25 PROCEDURE — 70553 MRI BRAIN STEM W/O & W/DYE: CPT

## 2022-02-25 PROCEDURE — 250N000009 HC RX 250: Performed by: EMERGENCY MEDICINE

## 2022-02-25 PROCEDURE — 82310 ASSAY OF CALCIUM: CPT | Performed by: EMERGENCY MEDICINE

## 2022-02-25 PROCEDURE — 99285 EMERGENCY DEPT VISIT HI MDM: CPT | Mod: 25

## 2022-02-25 PROCEDURE — 70450 CT HEAD/BRAIN W/O DYE: CPT | Mod: XS

## 2022-02-25 PROCEDURE — 255N000002 HC RX 255 OP 636: Performed by: EMERGENCY MEDICINE

## 2022-02-25 PROCEDURE — 71046 X-RAY EXAM CHEST 2 VIEWS: CPT

## 2022-02-25 PROCEDURE — 84484 ASSAY OF TROPONIN QUANT: CPT | Mod: 91 | Performed by: EMERGENCY MEDICINE

## 2022-02-25 PROCEDURE — 36415 COLL VENOUS BLD VENIPUNCTURE: CPT | Performed by: EMERGENCY MEDICINE

## 2022-02-25 PROCEDURE — 70496 CT ANGIOGRAPHY HEAD: CPT

## 2022-02-25 PROCEDURE — 85025 COMPLETE CBC W/AUTO DIFF WBC: CPT | Performed by: EMERGENCY MEDICINE

## 2022-02-25 PROCEDURE — 250N000011 HC RX IP 250 OP 636: Performed by: EMERGENCY MEDICINE

## 2022-02-25 PROCEDURE — 93005 ELECTROCARDIOGRAM TRACING: CPT

## 2022-02-25 PROCEDURE — 70498 CT ANGIOGRAPHY NECK: CPT

## 2022-02-25 RX ORDER — GADOBUTROL 604.72 MG/ML
7.5 INJECTION INTRAVENOUS ONCE
Status: COMPLETED | OUTPATIENT
Start: 2022-02-25 | End: 2022-02-25

## 2022-02-25 RX ORDER — LIDOCAINE 40 MG/G
CREAM TOPICAL
Status: DISCONTINUED | OUTPATIENT
Start: 2022-02-25 | End: 2022-02-25 | Stop reason: HOSPADM

## 2022-02-25 RX ORDER — IOPAMIDOL 755 MG/ML
500 INJECTION, SOLUTION INTRAVASCULAR ONCE
Status: COMPLETED | OUTPATIENT
Start: 2022-02-25 | End: 2022-02-25

## 2022-02-25 RX ADMIN — GADOBUTROL 5.5 ML: 604.72 INJECTION INTRAVENOUS at 14:16

## 2022-02-25 RX ADMIN — SODIUM CHLORIDE 80 ML: 9 INJECTION, SOLUTION INTRAVENOUS at 11:29

## 2022-02-25 RX ADMIN — IOPAMIDOL 70 ML: 755 INJECTION, SOLUTION INTRAVENOUS at 11:29

## 2022-02-25 ASSESSMENT — ENCOUNTER SYMPTOMS
DIARRHEA: 0
ABDOMINAL PAIN: 0
FEVER: 0
SHORTNESS OF BREATH: 1
DYSURIA: 0
COUGH: 0
VOMITING: 0

## 2022-02-25 NOTE — ED PROVIDER NOTES
History     Chief Complaint:  Dizziness       HPI   Jennifer Whiteside is a 87 year old female who presents with part of the dizziness described as a spinning sensation, chest pressure which is substernal and shortness of breath which started after 8 AM this morning.  She stated she felt normal when she woke up had her usual breakfast and took her medications per usual.  Later upon rising had acute onset of the symptoms as described above.  They resolved spontaneously after approximately 30 minutes.  She reported seeing large dark spots in her right eye during the event.  She states that she has had similar episodes in the past.  And that she has dizziness or vertigo type symptoms a few times per year.  The vertigo symptoms were worse with movement of her head however she also states that she always has the right eye visual change associated with it.    She admits that she did fall during episode of dizziness and struck her head on the right side and the back but did not lose consciousness.  She denies headache.  Denies other injury.     She had no vomiting or diarrhea.  She denies any urinary symptoms.  She denies any trauma.  She had no cough or fever.  She is anticoagulated due to atrial fibrillation and has been compliant with this medication.    ROS:  Review of Systems   Constitutional: Negative for fever.   Respiratory: Positive for shortness of breath. Negative for cough.    Cardiovascular: Positive for chest pain. Negative for leg swelling.   Gastrointestinal: Negative for abdominal pain, diarrhea and vomiting.   Genitourinary: Negative for dysuria and urgency.   All other systems reviewed and are negative.        Allergies:  No Known Allergies     Medications:    apixaban ANTICOAGULANT (ELIQUIS) 2.5 MG tablet  metoprolol tartrate (LOPRESSOR) 25 MG tablet  alendronate (FOSAMAX) 70 MG tablet  GARLIC PO  latanoprost (XALATAN) 0.005 % ophthalmic solution  Multiple Vitamin (MULTI-VITAMIN) per  tablet  Omega-3 Fatty Acids (FISH OIL) 1200 MG capsule  polyethylene glycol-propylene glycol (SYSTANE ULTRA) 0.4-0.3 % SOLN ophthalmic solution  pravastatin (PRAVACHOL) 40 MG tablet  predniSONE (DELTASONE) 20 MG tablet  sertraline (ZOLOFT) 50 MG tablet  Vitamin D, Cholecalciferol, 25 MCG (1000 UT) TABS        Past Medical History:    Past Medical History:   Diagnosis Date     Atrial fibrillation (H) 5/2012     Depressive disorder      Elevated LFTs      Esophageal stenosis      Hyperlipidaemia      Hyperlipidemia      Irregular heart beat      New onset atrial fibrillation (H) 5/13/2012     Osteoarthrosis      PAD (peripheral artery disease) (H)      PVD (peripheral vascular disease) (H)      SVT (supraventricular tachycardia) (H) 4/21/2020     Patient Active Problem List   Diagnosis     Generalized osteoarthrosis, unspecified site     Dyspepsia     Hyperlipidemia LDL goal <70     Advance Care Planning     Paroxysmal atrial fibrillation (H)     Bilateral carotid artery disease, unspecified type (H)     Osteopenia of multiple sites     SVT (supraventricular tachycardia) (H)     Right lumbar radiculopathy        Past Surgical History:    Past Surgical History:   Procedure Laterality Date     APPENDECTOMY      appy as a child     CLOSED REDUCTION WRIST Right 1/20/2016    Procedure: CLOSED REDUCTION WRIST;  Surgeon: Mina Brand MD;  Location:  OR     COLONOSCOPY  8/6/2013    Procedure: COMBINED COLONOSCOPY, SINGLE BIOPSY/POLYPECTOMY BY BIOPSY;  COLONOSCOPY with polypectomy using cold forceps.;  Surgeon: Madeline Oviedo MD;  Location:  GI     COLONOSCOPY  8/29/2016    Dr. Oviedo [facility]     COLONOSCOPY N/A 8/29/2016    Procedure: COMBINED COLONOSCOPY, SINGLE OR MULTIPLE BIOPSY/POLYPECTOMY BY BIOPSY;  Surgeon: Madeline Oviedo MD;  Location:  GI     ENDARTERECTOMY CAROTID  5/10/2012    LEFT, WITH EEG ; Surgeon:SELINA HANKS; Location: OR     ENT SURGERY      T&A as a child     HC CORRECT BUNION,SIMPLE       Right     HC REPAIR ROTATOR CUFF,ACUTE  2000    Right     HC REPAIR ROTATOR CUFF,ACUTE  2002    Left     HYSTERECTOMY, VAGINAL      simple      rt foot hammer toe repair  2005     ZZC HILLIARD W/O FACETEC FORAMOT/[facility] 1/2 VRT SEG, LUMBAR      L4        Family History:    family history includes Arthritis in her sister; C.A.D. in her father; Colon Cancer in her sister; Diabetes in her brother and brother; Family History Negative in her daughter, son, son, and son; Gynecology in her daughter; Hypertension in her daughter and mother.    Social History:   reports that she has quit smoking. Her smoking use included cigarettes. She has a 1.50 pack-year smoking history. She has never used smokeless tobacco. She reports current alcohol use. She reports that she does not use drugs.  PCP: Carisa Waggoner     Physical Exam     Patient Vitals for the past 24 hrs:   BP Temp Pulse Resp SpO2 Weight   02/25/22 1515 (!) 182/91 -- 82 -- 100 % --   02/25/22 1500 (!) 186/133 -- 86 -- 99 % --   02/25/22 1454 -- -- 82 -- 99 % --   02/25/22 1400 (!) 178/89 -- 72 -- 99 % --   02/25/22 1345 (!) 174/91 -- 80 -- 98 % --   02/25/22 1330 (!) 173/83 -- 75 -- 99 % --   02/25/22 1300 (!) 172/85 -- 78 -- 99 % --   02/25/22 1245 (!) 148/76 -- 75 -- 96 % --   02/25/22 1230 (!) 154/78 -- 75 -- 96 % --   02/25/22 1215 (!) 157/80 -- 74 -- 97 % --   02/25/22 1200 (!) 171/83 -- 67 -- 99 % --   02/25/22 1115 133/79 -- 84 -- 98 % --   02/25/22 1100 -- -- 86 -- 94 % --   02/25/22 1030 -- -- 92 -- 95 % --   02/25/22 1000 121/83 -- 90 -- 96 % --   02/25/22 0949 90/60 -- -- -- -- --   02/25/22 0948 -- -- 84 18 100 % --   02/25/22 0946 -- 97.6  F (36.4  C) -- -- -- 55.8 kg (123 lb)        Physical Exam    HENT:    Mouth/Throat: Oral mucosa moist.    Eyes: Conjunctivae are normal. No scleral icterus.  Neck: Neck supple. No cervical adenopathy  Cardiovascular: Normal rate, regular rhythm and intact distal pulses.    Pulmonary/Chest: Effort normal and breath sounds  normal.   Abdominal: Soft.  No distension. There is no tenderness.   Musculoskeletal:  No edema, No calf tenderness  Neurological:Alert and oriented. Cranial nerves II-XII intact.Upper and lower extremity strength intact throughout. Light touch sensation intact throughout. Finger-Nose-Finger without dysmetria. .   Skin: Skin is warm and dry.   Psychiatric: Normal mood and affect.       Emergency Department Course     ECG  ECG taken at 1029, ECG read at 1034  Normal sinus rhythm  Possible left atrial enlargement  Borderline ECG  No significant change as compared to prior, dated 8/17/21.  Rate 90 bpm. VA interval 186 ms. QRS duration 80 ms. QT/QTc 368/450 ms. P-R-T axes 47 -16 56.      Imaging:  MR Brain w/o & w Contrast   Final Result   IMPRESSION:    1.  No acute intracranial finding. No evidence for recent ischemia,   intracranial hemorrhage, or mass.   2.  Mild atrophy and presumed chronic small vessel ischemic changes.      LARA STANLEY MD            SYSTEM ID:  [id number]      CTA Head Neck with Contrast   Preliminary Result   IMPRESSION:   1. No high-grade stenosis or large vessel occlusion involving major   intracranial arteries.   2. Fetal origin of the bilateral posterior cerebral arteries from the   anterior circulation with developmental hypoplasia of the   vertebrobasilar system, normal variant.   3. Mixed atherosclerosis of the right carotid bifurcation with less   than 50% stenosis by NASCET criteria. No significant stenosis of the   cervical left internal carotid artery.   4. Patent cervical vertebral arteries.       Head CT w/o contrast   Preliminary Result   IMPRESSION:   1. No definite CT findings of acute intracranial process.   2. Mild generalized brain parenchymal volume loss.   3. Probable chronic small infarct in the left parietal white matter.   Mild scattered nonspecific patchy hypodensity in the cerebral white   matter elsewhere, as described.      XR Chest 2 Views   Final Result    IMPRESSION:  There are no acute infiltrates. The cardiac silhouette is   not enlarged. Pulmonary vasculature is unremarkable.      SANTA KRISHNAN MD            SYSTEM ID:  [id number]      Report per radiology.     Laboratory:  Labs Ordered and Resulted from Time of ED Arrival to Time of ED Departure   CBC WITH PLATELETS AND DIFFERENTIAL - Abnormal       Result Value    WBC Count 6.0      RBC Count 3.70 (*)     Hemoglobin 12.1      Hematocrit 37.2       (*)     MCH 32.7      MCHC 32.5      RDW 11.9      Platelet Count 234      % Neutrophils 72      % Lymphocytes 18      % Monocytes 9      % Eosinophils 0      % Basophils 1      % Immature Granulocytes 0      NRBCs per 100 WBC 0      Absolute Neutrophils 4.3      Absolute Lymphocytes 1.1      Absolute Monocytes 0.6      Absolute Eosinophils 0.0      Absolute Basophils 0.0      Absolute Immature Granulocytes 0.0      Absolute NRBCs 0.0     BASIC METABOLIC PANEL - Normal    Sodium 137      Potassium 3.8      Chloride 104      Carbon Dioxide (CO2) 26      Anion Gap 7      Urea Nitrogen 17      Creatinine 0.82      Calcium 8.8      Glucose 96      GFR Estimate 69     TROPONIN I - Normal    Troponin I High Sensitivity 11     TROPONIN I - Normal    Troponin I High Sensitivity 18              Emergency Department Course:             Reviewed:  I reviewed nursing notes    Assessments:   I obtained history and examined the patient as noted above.   I rechecked the patient and explained findings  Patient has been up and been ambulating without difficulty.  She has had no recurrence of her symptoms.        Interventions:  Medications   CT Scan Flush (80 mLs Intravenous Given 2/25/22 1129)   iopamidol (ISOVUE-370) solution 500 mL (70 mLs Intravenous Given 2/25/22 1129)   gadobutrol (GADAVIST) injection 7.5 mL (5.5 mLs Intravenous Given 2/25/22 1416)        Disposition:  The patient was discharged to home.     Impression & Plan      Medical Decision Making:  Jennifer Law  Stevo is a 87 year old female who presents with chest pain, shortness of breath, dizziness which seems consistent with vertigo as described above.  She also had a fall secondary to the vertigo.  She is anticoagulated.  EKG revealed no acute ischemic changes.  CT of the head was obtained due to being anticoagulated and having head trauma.  This was negative.  I also obtained a CTA due to this vertigo with associated vision changes which does not appear to have been evaluated in the past with brain imaging.  Additionally she has had an endarterectomy in the past.  This revealed no intracranial hemorrhage and no large vessel occlusion.  MRI was then obtained as well to evaluate more fully for TIA.  This was also unremarkable for acute findings.  Labs were reassuring including a delta troponin.  Reviewing her records I see that she presented with similar symptoms last summer and had a negative Lexiscan stress test at that time.  At this time with reasonable clinical certainty I feel that she safe for discharge home with ongoing evaluation and management as an outpatient.  I recommended returning to the emergency department if she has recurrence of her symptoms otherwise consulting with her primary care physician and cardiologist for ongoing guidance and ongoing outpatient evaluation and management as indicated.  At this time she is not interested in meclizine or treatment for positional vertigo.        Diagnosis:    ICD-10-CM    1. Vertigo  R42    2. Chest pain, unspecified type  R07.9         Discharge Medications:  New Prescriptions    No medications on file      Scribe Disclosure:  Heidi CASTRO, am serving as a scribe at 11:06 AM on 2/25/2022 to document services personally performed by Shira Nugent MD based on my observations and the provider's statements to me.     Shira Nugent MD  02/25/22 [id number]

## 2022-02-25 NOTE — ED NOTES
Patient denies further dizziness, chest pain, or palpitations since admitted back to ED room. Patient educated to let staff know if these symptoms return.

## 2022-02-25 NOTE — ED NOTES
Peripheral IV removed. BP elevated, other VSS, on room air. Discharge instructions discussed, verbalized all understanding of education, al questions answered. Brother at bedside, transporting home. No new medications at discharge. Patient verbalized understanding of when to follow up with PCP/ED.

## 2022-02-25 NOTE — DISCHARGE INSTRUCTIONS
Discharge Instructions  Chest Pain    You have been seen today for chest pain or discomfort.  At this time, your provider has found no signs that your chest pain is due to a serious or life-threatening condition, (or you have declined more testing and/or admission to the hospital). However, sometimes there is a serious problem that does not show up right away. Your evaluation today may not be complete and you may need further testing and evaluation.     Generally, every Emergency Department visit should have a follow-up clinic visit with either a primary or a specialty clinic/provider. Please follow-up as instructed by your emergency provider today.  Return to the Emergency Department if:  Your chest pain changes, gets worse, starts to happen more often, or comes with less activity.  You are newly short of breath.  You get very weak or tired.  You pass out or faint.  You have any new symptoms, like fever, cough, numb legs, or you cough up blood.  You have anything else that worries you.    Until you follow-up with your regular provider, please do the following:    If a stress test appointment has been made, go to the appointment.  If you have questions, contact your regular provider.  Follow-up with your regular provider/clinic as directed; this is very important.    If you were given a prescription for medicine here today, be sure to read all of the information (including the package insert) that comes with your prescription.  This will include important information about the medicine, its side effects, and any warnings that you need to know about.  The pharmacist who fills the prescription can provide more information and answer questions you may have about the medicine.  If you have questions or concerns that the pharmacist cannot address, please call or return to the Emergency Department.       Remember that you can always come back to the Emergency Department if you are not able to see your regular provider in  the amount of time listed above, if you get any new symptoms, or if there is anything that worries you.

## 2022-02-25 NOTE — ED TRIAGE NOTES
"Pt here with c/o CP, SOB and dizziness \"like room is spinning\" and lightheadedness. Pt on Eloquist for afib. ABC intact. A&Ox4.   "

## 2022-02-28 DIAGNOSIS — I48.0 PAROXYSMAL ATRIAL FIBRILLATION (H): ICD-10-CM

## 2022-02-28 DIAGNOSIS — R00.0 TACHYCARDIA: ICD-10-CM

## 2022-03-01 ENCOUNTER — OFFICE VISIT (OUTPATIENT)
Dept: INTERNAL MEDICINE | Facility: CLINIC | Age: 87
End: 2022-03-01
Payer: COMMERCIAL

## 2022-03-01 VITALS
DIASTOLIC BLOOD PRESSURE: 70 MMHG | HEART RATE: 74 BPM | TEMPERATURE: 99.1 F | OXYGEN SATURATION: 96 % | WEIGHT: 127.5 LBS | BODY MASS INDEX: 21.77 KG/M2 | RESPIRATION RATE: 16 BRPM | HEIGHT: 64 IN | SYSTOLIC BLOOD PRESSURE: 115 MMHG

## 2022-03-01 DIAGNOSIS — I48.0 PAROXYSMAL ATRIAL FIBRILLATION (H): ICD-10-CM

## 2022-03-01 DIAGNOSIS — R07.89 LEFT CHEST PRESSURE: ICD-10-CM

## 2022-03-01 DIAGNOSIS — R42 VERTIGO: Primary | ICD-10-CM

## 2022-03-01 DIAGNOSIS — F43.9 STRESS: ICD-10-CM

## 2022-03-01 PROCEDURE — 99214 OFFICE O/P EST MOD 30 MIN: CPT | Performed by: FAMILY MEDICINE

## 2022-03-01 RX ORDER — LORAZEPAM 0.5 MG/1
TABLET ORAL
Qty: 20 TABLET | Refills: 1 | Status: SHIPPED | OUTPATIENT
Start: 2022-03-01 | End: 2022-10-04

## 2022-03-01 NOTE — PROGRESS NOTES
(R42) Vertigo  (primary encounter diagnosis)  Comment:   Follow-up ER visit William 25.  Extensive cerebrovascular work-up did not show acute CVA or significant arterial stenosis.  Patient gets occasional spells of vertigo.  Plan:       (R07.89) Left chest pressure  Comment:   Symptom typically accompanies the vertigo.  Negative stress test recently, August 2021.  Plan:       (F43.9) Stress  Comment:   Family and worldwide.  These things are bothering her.  Patient acknowledges a stress component following these vertigo spells.  Plan: LORazepam (ATIVAN) 0.5 MG tablet        Trial of Ativan and bedrest for recurrence.  Close follow-up advised in about 3 weeks.  See instructions.  Patient is agreeable.      (I48.0) Paroxysmal atrial fibrillation (H)  Comment:   History noted.  I am hesitant to back off on her metoprolol as she may need for rate control.  Plan:             Subjective     Follow-up ER visit.  Vertigo, chest pressure, short of breath.      HPI     ED/UC Followup:    Facility[facility]  Date of visit: 02/25/22  Reason for visit: dizziness, chest pressure, sob  Current Status: tired, some sob, some light headedness       ER notes and work-up reviewed.    Patient is describing spells where she will become vertiginous and this is accompanied by a feeling of chest pressure and short windedness.  These can last a couple of hours.    She had a fairly extensive work-up which did not disclose significant cerebrovascular circulation abnormalities, acute CVA, cardiac ischemia.    She has not had any recurrence since the ER visit.    Apparently these are fairly typical spells for her.    Patient feels there is quite a stress component.  She has some family matters among siblings which is bothering her.  She also was bothered by the world nose.  Feels these compound the stress issue.    Patient has A. fib.  Evidently intermittent.      Review of Systems     No fevers or chills.  No cough or wheeze.  No severe chest  "pain.  No palpitations.  No nausea or diarrhea.  No edema.  No focal weakness.        Objective    /70 (BP Location: Right arm, Patient Position: Chair, Cuff Size: Adult Regular)   Pulse 74   Temp 99.1  F (37.3  C) (Tympanic)   Resp 16   Ht 1.619 m (5' 3.75\")   Wt 57.8 kg (127 lb 8 oz)   LMP  (LMP Unknown)   SpO2 96%   Breastfeeding No   BMI 22.06 kg/m    There is no height or weight on file to calculate BMI.  Physical Exam     Alert, conversant, NAD.  HEENT shows face to be symmetrical, pupils equal, speech clear.  Neck no mass or thyromegaly.  Chest clear.  Moving air well.  Cardiac regular without obvious murmur.  Abdomen nondistended.  Extremities no significant edema or weakness.  Gait independent.        "

## 2022-03-01 NOTE — PATIENT INSTRUCTIONS
Take prescribed medication as directed for spells of stress..    Re check in 2-3 weeks. Dr Waggoner or Dr Cornejo.

## 2022-03-22 ENCOUNTER — OFFICE VISIT (OUTPATIENT)
Dept: INTERNAL MEDICINE | Facility: CLINIC | Age: 87
End: 2022-03-22
Payer: COMMERCIAL

## 2022-03-22 VITALS
BODY MASS INDEX: 21.49 KG/M2 | HEIGHT: 64 IN | RESPIRATION RATE: 18 BRPM | HEART RATE: 82 BPM | OXYGEN SATURATION: 98 % | WEIGHT: 125.9 LBS | SYSTOLIC BLOOD PRESSURE: 120 MMHG | TEMPERATURE: 98.1 F | DIASTOLIC BLOOD PRESSURE: 66 MMHG

## 2022-03-22 DIAGNOSIS — R42 ORTHOSTATIC DIZZINESS: Primary | ICD-10-CM

## 2022-03-22 DIAGNOSIS — I48.0 PAROXYSMAL ATRIAL FIBRILLATION (H): ICD-10-CM

## 2022-03-22 DIAGNOSIS — R03.0 ELEVATED BLOOD PRESSURE READING WITHOUT DIAGNOSIS OF HYPERTENSION: ICD-10-CM

## 2022-03-22 PROCEDURE — 99214 OFFICE O/P EST MOD 30 MIN: CPT | Performed by: FAMILY MEDICINE

## 2022-03-22 RX ORDER — METOPROLOL TARTRATE 25 MG/1
12.5 TABLET, FILM COATED ORAL 2 TIMES DAILY
Qty: 180 TABLET | Refills: 0 | COMMUNITY
Start: 2022-03-22 | End: 2022-03-24 | Stop reason: ALTCHOICE

## 2022-03-22 RX ORDER — CHLORTHALIDONE 25 MG/1
12.5 TABLET ORAL DAILY
COMMUNITY
Start: 2022-03-22 | End: 2022-03-24 | Stop reason: SINTOL

## 2022-03-22 RX ORDER — CHLORTHALIDONE 25 MG/1
TABLET ORAL
COMMUNITY
Start: 2022-03-21 | End: 2022-03-22

## 2022-03-22 NOTE — PROGRESS NOTES
(R42) Orthostatic dizziness  (primary encounter diagnosis)  Comment:   Her main concern.  Present since she started metoprolol which is over a year or 2 ago.  Possible medication side effect.  Plan:   On a trial basis cut metoprolol to 12.5 mg twice daily.  If she develops heart racing or palpitations this is to be placed back to 25 mg twice daily.      (I48.0) Paroxysmal atrial fibrillation (H)  Comment:   Noted above situation.  I did ask her to set up another appointment with her cardiologist because we are running into possible side effects of medication and some hypertension issues.  Plan: metoprolol tartrate (LOPRESSOR) 25 MG tablet,         Adult Cardiology Eval  Referral            (R03.0) Elevated blood pressure reading without diagnosis of hypertension  Comment:   She checked her blood pressure multiple times and it was high so she went to the emergency room.  Work-up there was basically WNL.  Chlorthalidone 25 mg was added.  Plan: chlorthalidone (HYGROTON) 25 MG tablet, Adult         Cardiology Eval  Referral        I did suggest we cut this dose down to 12.5 mg on a trial basis also.  See instructions.        Subjective         History of Present Illness       Vascular Disease:  She presents for follow up of vascular disease.  She never takes nitroglycerin. She is not taking daily aspirin.She consumes 0 sweetened beverage(s) daily.She exercises with enough effort to increase her heart rate 9 or less minutes per day.  She exercises with enough effort to increase her heart rate 3 or less days per week.   She is taking medications regularly.       ED/UC Followup:    [facility]  Date of visit: 03/20/22  Reason for visit: htn  Current Status: gets dizzy spells       87-year-old woman on treatment for A. fib.  Meds were reviewed.  More recently has been at times hypertensive.    Her main concern is dizziness which she relates to starting metoprolol a couple of  "years ago.  It is mainly orthostatic in nature.  She has not had any falls.    After noting high blood pressure at home she went to the emergency room where her pressure was 194/101.  They added chlorthalidone 25 mg twice daily.    She is a little concerned because she has not officially been treated for high blood pressure before, more so for the A. fib.      Review of Systems     No fevers.  No cough or short of breath.  No chest pain.  No nausea or abdominal pain.  No headaches, focal weakness.          Objective    /66 (BP Location: Left arm, Patient Position: Chair, Cuff Size: Adult Regular)   Pulse 82   Temp 98.1  F (36.7  C) (Tympanic)   Resp 18   Ht 1.619 m (5' 3.75\")   Wt 57.1 kg (125 lb 14.4 oz)   LMP  (LMP Unknown)   SpO2 98%   Breastfeeding No   BMI 21.78 kg/m    Body mass index is 21.78 kg/m .  Physical Exam       Thin woman, NAD.  Face is symmetrical.  Pupils equal.  Speech clear.  Neck with no thyromegaly.  Lungs are clear.  Cardiac RSR without murmur.  Abdomen nondistended.  Nontender.  Extremities without edema.    "

## 2022-03-22 NOTE — PATIENT INSTRUCTIONS
Note reduced doses of Metoprolol, Chlorthalidone.    If heart races or is irregular, you can resume Metoprolol 25 mg (whole tab) twice daily.    Follow up with Cardiology.    Check BP twice a day (morning and 5 PM).

## 2022-03-24 ENCOUNTER — OFFICE VISIT (OUTPATIENT)
Dept: CARDIOLOGY | Facility: CLINIC | Age: 87
End: 2022-03-24
Attending: FAMILY MEDICINE
Payer: COMMERCIAL

## 2022-03-24 VITALS
WEIGHT: 125.4 LBS | HEART RATE: 72 BPM | DIASTOLIC BLOOD PRESSURE: 58 MMHG | BODY MASS INDEX: 21.41 KG/M2 | OXYGEN SATURATION: 99 % | HEIGHT: 64 IN | SYSTOLIC BLOOD PRESSURE: 98 MMHG

## 2022-03-24 DIAGNOSIS — I95.9 HYPOTENSION, UNSPECIFIED HYPOTENSION TYPE: ICD-10-CM

## 2022-03-24 DIAGNOSIS — R42 LIGHTHEADEDNESS: ICD-10-CM

## 2022-03-24 DIAGNOSIS — I48.0 PAROXYSMAL ATRIAL FIBRILLATION (H): Primary | ICD-10-CM

## 2022-03-24 DIAGNOSIS — R03.0 ELEVATED BLOOD PRESSURE READING WITHOUT DIAGNOSIS OF HYPERTENSION: ICD-10-CM

## 2022-03-24 PROCEDURE — 99214 OFFICE O/P EST MOD 30 MIN: CPT | Performed by: NURSE PRACTITIONER

## 2022-03-24 RX ORDER — METOPROLOL SUCCINATE 25 MG/1
25 TABLET, EXTENDED RELEASE ORAL EVERY EVENING
Qty: 30 TABLET | Refills: 0 | Status: SHIPPED | OUTPATIENT
Start: 2022-03-24 | End: 2022-04-13

## 2022-03-24 NOTE — LETTER
3/24/2022    Carisa Waggoner MD  [address] [facility] [address]  [facility] [address]    RE: Jennifer Whiteside       Dear Colleague,     I had the pleasure of seeing Jennifer Whiteside in the [facility].  Cardiology Clinic Progress Note  Jennifer Whiteside MRN# [id number]   YOB: 1935 Age: 87 year old     Reason For Visit:  1 month follow-up   Primary Cardiologist:   Dr. Truong          History of Presenting Illness:      Jennifer Whiteside is a pleasant 87 year old patient who carries a past medical history significant for paroxysmal atrial fibrillation on anticoagulation SVT, hypertension, hyperlipidemia, bilateral carotid artery disease status post endarterectomy, PVD, and anxiety.    She was last seen on 2/21/2022 in follow-up to her atrial fibrillation which remains well controlled on metoprolol and Eliquis.  She was recently seen by her PCP where she reported significant fatigue believed to be due to metoprolol. She was also noted to be hypertensive and was started on 12.5 mg chlorthalidone. Metoprolol was reduced to 12.5 mg twice daily in an attempt to improve fatigue. She continues to have wide swings in blood pressures despite recent medication changed.     She presents to the office today with complaints of significant lightheadedness, dizziness, and frequent episodes of palpitations.  She denies chest pain, shortness of breath, PND, orthopnea, or edema.  Upon exam, lungs are clear bilaterally, heart rate and rhythm regular, no palpitations, and no lower extremity edema.    Blood pressure was mildly elevated at 140/76 with repeat reading reduced to 98/58 and orthostatic pressures standing at 95/62.  Last BMP and CBC were unremarkable   BMI 21.69    Activity level is unchanged  Follows a heart healthy diet. Stays well hydrated.   Remains compliant with all medications.                   Assessment and Plan:     1.  Paroxysmal atrial fibrillation - Currently  in sinus rhythm, managed on metoprolol and Eliquis for stroke prevention.  She notes an increase in palpitations after recent decrease in metoprolol.  Symptoms are primarily during the day.  I recommend switching metoprolol tartrate to long-acting Toprol 25 mg to be taken in the evening.  Monitor for any worsening symptoms.   2.  Lightheadedness -worsened since starting chlorthalidone.  Will stop chlorthalidone and allow washout.  3.  Hypertension -mildly elevated today in the 140s systolic with follow-up reading dropping in the 90s.  Orthostatic readings stable.  Will stop chlorthalidone (see above) and switch to long-acting Toprol starting tomorrow evening. If BP elevated > 110 mmHg tonight, ok to take pm dose of 12.5 metoprolol tartrate.   Monitor blood pressures twice daily with a goal of less than 140/90.  4.  Hyperlipidemia -on statin             Thank you for allowing me to participate in this delightful patient's care. I have recommended she follow up in 2 weeks for reassessment .       Cathie Jones, APRN CNP         Review of Systems:     Review of Systems:  Skin:  not assessed     Eyes:  Negative    ENT:  Negative    Respiratory:  Positive for shortness of breath;dyspnea on exertion  Cardiovascular:    fatigue;lightheadedness;dizziness;Positive for  Gastroenterology: Negative    Genitourinary:  not assessed    Musculoskeletal:  Negative    Neurologic:  Positive for headaches  Psychiatric:  Negative    Heme/Lymph/Imm:  not assessed    Endocrine:  Negative                Physical Exam:     GEN:  In general, this is a well nourished elderly female in no acute distress.  HEENT:  Pupils equal, round. Sclerae nonicteric. Clear oropharynx. Mucous membranes moist.  NECK: Supple, no masses appreciated. Trachea midline. No JVD   C/V:  Regular rate and rhythm, No murmur, rub or gallop. No S3 or RV heave.   RESP: Respirations are unlabored. No use of accessory muscles. Clear to auscultation bilaterally without  wheezing, rales, or rhonchi.  GI: Abdomen soft, nontender, nondistended. No HSM appreciated.   EXTREM: No LE edema. No cyanosis or clubbing.  NEURO: Alert and oriented, cooperative. No obvious focal deficits.   PSYCH: Normal affect.  SKIN: Warm and dry. No rashes or petechiae appreciated.          Past Medical History:     Past Medical History:   Diagnosis Date     Atrial fibrillation (H) 5/2012    with RVR.  Occurred POD #2 following carotid enarterectomy.     Depressive disorder      Elevated LFTs     occurred on lipitor; resolved off medications     Esophageal stenosis      Hyperlipidaemia      Hyperlipidemia      Irregular heart beat      New onset atrial fibrillation (H) 5/13/2012     Osteoarthrosis      PAD (peripheral artery disease) (H)      PVD (peripheral vascular disease) (H)     s/p carotid enarterectomy 5-10-12     SVT (supraventricular tachycardia) (H) 4/21/2020              Past Surgical History:     Past Surgical History:   Procedure Laterality Date     APPENDECTOMY      appy as a child     CLOSED REDUCTION WRIST Right 1/20/2016    Procedure: CLOSED REDUCTION WRIST;  Surgeon: Mina Brand MD;  Location:  OR     COLONOSCOPY  8/6/2013    Procedure: COMBINED COLONOSCOPY, SINGLE BIOPSY/POLYPECTOMY BY BIOPSY;  COLONOSCOPY with polypectomy using cold forceps.;  Surgeon: Madeline Oviedo MD;  Location:  GI     COLONOSCOPY  8/29/2016    Dr. Oviedo [facility]     COLONOSCOPY N/A 8/29/2016    Procedure: COMBINED COLONOSCOPY, SINGLE OR MULTIPLE BIOPSY/POLYPECTOMY BY BIOPSY;  Surgeon: Madeline Oviedo MD;  Location:  GI     ENDARTERECTOMY CAROTID  5/10/2012    LEFT, WITH EEG ; Surgeon:SELINA HANKS; Location: OR     ENT SURGERY      T&A as a child     HC CORRECT BUNION,SIMPLE      Right     HC REPAIR ROTATOR CUFF,ACUTE  2000    Right     HC REPAIR ROTATOR CUFF,ACUTE  2002    Left     HYSTERECTOMY, VAGINAL      simple      rt foot hammer toe repair  2005     ZZC HILLIARD W/O FACETEC FORAMOT/[facility]KC 1/2  VRT SEG, LUMBAR      L4              Allergies:   Patient has no known allergies.       Data:   All laboratory data reviewed:    LAST CHOLESTEROL:  Lab Results   Component Value Date    CHOL 161 04/23/2021     Lab Results   Component Value Date    HDL 73 04/23/2021     Lab Results   Component Value Date    LDL 76 04/23/2021     Lab Results   Component Value Date    TRIG 60 04/23/2021     Lab Results   Component Value Date    CHOLHDLRATIO 2.6 02/09/2015       LAST BMP:  Lab Results   Component Value Date     02/25/2022     04/23/2021      Lab Results   Component Value Date    POTASSIUM 3.8 02/25/2022    POTASSIUM 4.3 04/23/2021     Lab Results   Component Value Date    CHLORIDE 104 02/25/2022    CHLORIDE 98 04/23/2021     Lab Results   Component Value Date    MABEL 8.8 02/25/2022    MABEL 9.1 04/23/2021     Lab Results   Component Value Date    CO2 26 02/25/2022    CO2 29 04/23/2021     Lab Results   Component Value Date    BUN 17 02/25/2022    BUN 11 04/23/2021     Lab Results   Component Value Date    CR 0.82 02/25/2022    CR 0.68 04/23/2021     Lab Results   Component Value Date    GLC 96 02/25/2022    GLC 95 04/23/2021       LAST CBC:  Lab Results   Component Value Date    WBC 6.0 02/25/2022    WBC 6.1 11/29/2020     Lab Results   Component Value Date    RBC 3.70 02/25/2022    RBC 3.75 11/29/2020     Lab Results   Component Value Date    HGB 12.1 02/25/2022    HGB 12.5 04/23/2021     Lab Results   Component Value Date    HCT 37.2 02/25/2022    HCT 37.6 11/29/2020     Lab Results   Component Value Date     02/25/2022     11/29/2020     Lab Results   Component Value Date    MCH 32.7 02/25/2022    MCH 32.8 11/29/2020     Lab Results   Component Value Date    MCHC 32.5 02/25/2022    MCHC 32.7 11/29/2020     Lab Results   Component Value Date    RDW 11.9 02/25/2022    RDW 11.9 11/29/2020     Lab Results   Component Value Date     02/25/2022     11/29/2020               Thank you for  allowing me to participate in the care of your patient.      Sincerely,     PAVITHRA Charles [facility]  cc:   Spencer Cornejo MD  [facility] [place] [address]

## 2022-03-24 NOTE — PATIENT INSTRUCTIONS
Thanks for participating in a office visit with the [facility] clinic today.    Worsening lightheadedness, dizziness and hypotensive after starting chlorthalidone. Stop medication  Episodes of dizziness and palpitations during the day.  Switch metoprolol tartrate to Metoprolol succinate 25 mg in the evening/bedtime.   Monitor blood pressures daily with a goal of < 140/90  On take 12.5 mg metoprolol tonight if blood pressure is > 110  Continue on all other medications    Follow up in 2 weeks with TYRONE     Please call my nurse at [phone] with any questions or concerns.    Scheduling phone number: [phone]  Reminder: Please bring in all current medications, over the counter supplements and vitamin bottles to your next appointment.

## 2022-03-24 NOTE — LETTER
3/24/2022      RE: Jennifer Whiteside  [address]  [facility] [address]       Cardiology Clinic Progress Note  Jennifer Whiteside MRN# [id number]   YOB: 1935 Age: 87 year old     Reason For Visit:  1 month follow-up   Primary Cardiologist:   Dr. Truong          History of Presenting Illness:      Jennifer Whiteside is a pleasant 87 year old patient who carries a past medical history significant for paroxysmal atrial fibrillation on anticoagulation SVT, hypertension, hyperlipidemia, bilateral carotid artery disease status post endarterectomy, PVD, and anxiety.    She was last seen on 2/21/2022 in follow-up to her atrial fibrillation which remains well controlled on metoprolol and Eliquis.  She was recently seen by her PCP where she reported significant fatigue believed to be due to metoprolol. She was also noted to be hypertensive and was started on 12.5 mg chlorthalidone. Metoprolol was reduced to 12.5 mg twice daily in an attempt to improve fatigue. She continues to have wide swings in blood pressures despite recent medication changed.     She presents to the office today with complaints of significant lightheadedness, dizziness, and frequent episodes of palpitations.  She denies chest pain, shortness of breath, PND, orthopnea, or edema.  Upon exam, lungs are clear bilaterally, heart rate and rhythm regular, no palpitations, and no lower extremity edema.    Blood pressure was mildly elevated at 140/76 with repeat reading reduced to 98/58 and orthostatic pressures standing at 95/62.  Last BMP and CBC were unremarkable   BMI 21.69    Activity level is unchanged  Follows a heart healthy diet. Stays well hydrated.   Remains compliant with all medications.                   Assessment and Plan:     1.  Paroxysmal atrial fibrillation - Currently in sinus rhythm, managed on metoprolol and Eliquis for stroke prevention.  She notes an increase in palpitations after recent  decrease in metoprolol.  Symptoms are primarily during the day.  I recommend switching metoprolol tartrate to long-acting Toprol 25 mg to be taken in the evening.  Monitor for any worsening symptoms.   2.  Lightheadedness -worsened since starting chlorthalidone.  Will stop chlorthalidone and allow washout.  3.  Hypertension -mildly elevated today in the 140s systolic with follow-up reading dropping in the 90s.  Orthostatic readings stable.  Will stop chlorthalidone (see above) and switch to long-acting Toprol starting tomorrow evening. If BP elevated > 110 mmHg tonight, ok to take pm dose of 12.5 metoprolol tartrate.   Monitor blood pressures twice daily with a goal of less than 140/90.  4.  Hyperlipidemia -on statin             Thank you for allowing me to participate in this delightful patient's care. I have recommended she follow up in 2 weeks for reassessment .       PAVITHRA Charles CNP         Review of Systems:     Review of Systems:  Skin:  not assessed     Eyes:  Negative    ENT:  Negative    Respiratory:  Positive for shortness of breath;dyspnea on exertion  Cardiovascular:    fatigue;lightheadedness;dizziness;Positive for  Gastroenterology: Negative    Genitourinary:  not assessed    Musculoskeletal:  Negative    Neurologic:  Positive for headaches  Psychiatric:  Negative    Heme/Lymph/Imm:  not assessed    Endocrine:  Negative                Physical Exam:     GEN:  In general, this is a well nourished elderly female in no acute distress.  HEENT:  Pupils equal, round. Sclerae nonicteric. Clear oropharynx. Mucous membranes moist.  NECK: Supple, no masses appreciated. Trachea midline. No JVD   C/V:  Regular rate and rhythm, No murmur, rub or gallop. No S3 or RV heave.   RESP: Respirations are unlabored. No use of accessory muscles. Clear to auscultation bilaterally without wheezing, rales, or rhonchi.  GI: Abdomen soft, nontender, nondistended. No HSM appreciated.   EXTREM: No LE edema. No cyanosis or  clubbing.  NEURO: Alert and oriented, cooperative. No obvious focal deficits.   PSYCH: Normal affect.  SKIN: Warm and dry. No rashes or petechiae appreciated.          Past Medical History:     Past Medical History:   Diagnosis Date     Atrial fibrillation (H) 5/2012    with RVR.  Occurred POD #2 following carotid enarterectomy.     Depressive disorder      Elevated LFTs     occurred on lipitor; resolved off medications     Esophageal stenosis      Hyperlipidaemia      Hyperlipidemia      Irregular heart beat      New onset atrial fibrillation (H) 5/13/2012     Osteoarthrosis      PAD (peripheral artery disease) (H)      PVD (peripheral vascular disease) (H)     s/p carotid enarterectomy 5-10-12     SVT (supraventricular tachycardia) (H) 4/21/2020              Past Surgical History:     Past Surgical History:   Procedure Laterality Date     APPENDECTOMY      appy as a child     CLOSED REDUCTION WRIST Right 1/20/2016    Procedure: CLOSED REDUCTION WRIST;  Surgeon: Mina Brand MD;  Location:  OR     COLONOSCOPY  8/6/2013    Procedure: COMBINED COLONOSCOPY, SINGLE BIOPSY/POLYPECTOMY BY BIOPSY;  COLONOSCOPY with polypectomy using cold forceps.;  Surgeon: Madeline Oviedo MD;  Location:  GI     COLONOSCOPY  8/29/2016    Dr. Oviedo [facility]     COLONOSCOPY N/A 8/29/2016    Procedure: COMBINED COLONOSCOPY, SINGLE OR MULTIPLE BIOPSY/POLYPECTOMY BY BIOPSY;  Surgeon: Madeline Oviedo MD;  Location:  GI     ENDARTERECTOMY CAROTID  5/10/2012    LEFT, WITH EEG ; Surgeon:SELINA HANKS; Location: OR     ENT SURGERY      T&A as a child     HC CORRECT BUNION,SIMPLE      Right     HC REPAIR ROTATOR CUFF,ACUTE  2000    Right     HC REPAIR ROTATOR CUFF,ACUTE  2002    Left     HYSTERECTOMY, VAGINAL      simple      rt foot hammer toe repair  2005     ZZC HILLIARD W/O FACETEC FORAMOT/[facility] 1/2 VRT SEG, LUMBAR      L4              Allergies:   Patient has no known allergies.       Data:   All laboratory data reviewed:    LAST  CHOLESTEROL:  Lab Results   Component Value Date    CHOL 161 04/23/2021     Lab Results   Component Value Date    HDL 73 04/23/2021     Lab Results   Component Value Date    LDL 76 04/23/2021     Lab Results   Component Value Date    TRIG 60 04/23/2021     Lab Results   Component Value Date    CHOLHDLRATIO 2.6 02/09/2015       LAST BMP:  Lab Results   Component Value Date     02/25/2022     04/23/2021      Lab Results   Component Value Date    POTASSIUM 3.8 02/25/2022    POTASSIUM 4.3 04/23/2021     Lab Results   Component Value Date    CHLORIDE 104 02/25/2022    CHLORIDE 98 04/23/2021     Lab Results   Component Value Date    MABEL 8.8 02/25/2022    MABEL 9.1 04/23/2021     Lab Results   Component Value Date    CO2 26 02/25/2022    CO2 29 04/23/2021     Lab Results   Component Value Date    BUN 17 02/25/2022    BUN 11 04/23/2021     Lab Results   Component Value Date    CR 0.82 02/25/2022    CR 0.68 04/23/2021     Lab Results   Component Value Date    GLC 96 02/25/2022    GLC 95 04/23/2021       LAST CBC:  Lab Results   Component Value Date    WBC 6.0 02/25/2022    WBC 6.1 11/29/2020     Lab Results   Component Value Date    RBC 3.70 02/25/2022    RBC 3.75 11/29/2020     Lab Results   Component Value Date    HGB 12.1 02/25/2022    HGB 12.5 04/23/2021     Lab Results   Component Value Date    HCT 37.2 02/25/2022    HCT 37.6 11/29/2020     Lab Results   Component Value Date     02/25/2022     11/29/2020     Lab Results   Component Value Date    MCH 32.7 02/25/2022    MCH 32.8 11/29/2020     Lab Results   Component Value Date    MCHC 32.5 02/25/2022    MCHC 32.7 11/29/2020     Lab Results   Component Value Date    RDW 11.9 02/25/2022    RDW 11.9 11/29/2020     Lab Results   Component Value Date     02/25/2022     11/29/2020               PAVITHRA Charles CNP

## 2022-03-24 NOTE — PROGRESS NOTES
Cardiology Clinic Progress Note  Jennifer Whiteside MRN# [id number]   YOB: 1935 Age: 87 year old     Reason For Visit:  1 month follow-up   Primary Cardiologist:   Dr. Truong          History of Presenting Illness:      Jennifer Whiteside is a pleasant 87 year old patient who carries a past medical history significant for paroxysmal atrial fibrillation on anticoagulation SVT, hypertension, hyperlipidemia, bilateral carotid artery disease status post endarterectomy, PVD, and anxiety.    She was last seen on 2/21/2022 in follow-up to her atrial fibrillation which remains well controlled on metoprolol and Eliquis.  She was recently seen by her PCP where she reported significant fatigue believed to be due to metoprolol. She was also noted to be hypertensive and was started on 12.5 mg chlorthalidone. Metoprolol was reduced to 12.5 mg twice daily in an attempt to improve fatigue. She continues to have wide swings in blood pressures despite recent medication changed.     She presents to the office today with complaints of significant lightheadedness, dizziness, and frequent episodes of palpitations.  She denies chest pain, shortness of breath, PND, orthopnea, or edema.  Upon exam, lungs are clear bilaterally, heart rate and rhythm regular, no palpitations, and no lower extremity edema.    Blood pressure was mildly elevated at 140/76 with repeat reading reduced to 98/58 and orthostatic pressures standing at 95/62.  Last BMP and CBC were unremarkable   BMI 21.69    Activity level is unchanged  Follows a heart healthy diet. Stays well hydrated.   Remains compliant with all medications.                   Assessment and Plan:     1.  Paroxysmal atrial fibrillation - Currently in sinus rhythm, managed on metoprolol and Eliquis for stroke prevention.  She notes an increase in palpitations after recent decrease in metoprolol.  Symptoms are primarily during the day.  I recommend switching metoprolol  tartrate to long-acting Toprol 25 mg to be taken in the evening.  Monitor for any worsening symptoms.   2.  Lightheadedness -worsened since starting chlorthalidone.  Will stop chlorthalidone and allow washout.  3.  Hypertension -mildly elevated today in the 140s systolic with follow-up reading dropping in the 90s.  Orthostatic readings stable.  Will stop chlorthalidone (see above) and switch to long-acting Toprol starting tomorrow evening. If BP elevated > 110 mmHg tonight, ok to take pm dose of 12.5 metoprolol tartrate.   Monitor blood pressures twice daily with a goal of less than 140/90.  4.  Hyperlipidemia -on statin             Thank you for allowing me to participate in this delightful patient's care. I have recommended she follow up in 2 weeks for reassessment .       Cathie Jones, PAVITHRA CNP         Review of Systems:     Review of Systems:  Skin:  not assessed     Eyes:  Negative    ENT:  Negative    Respiratory:  Positive for shortness of breath;dyspnea on exertion  Cardiovascular:    fatigue;lightheadedness;dizziness;Positive for  Gastroenterology: Negative    Genitourinary:  not assessed    Musculoskeletal:  Negative    Neurologic:  Positive for headaches  Psychiatric:  Negative    Heme/Lymph/Imm:  not assessed    Endocrine:  Negative                Physical Exam:     GEN:  In general, this is a well nourished elderly female in no acute distress.  HEENT:  Pupils equal, round. Sclerae nonicteric. Clear oropharynx. Mucous membranes moist.  NECK: Supple, no masses appreciated. Trachea midline. No JVD   C/V:  Regular rate and rhythm, No murmur, rub or gallop. No S3 or RV heave.   RESP: Respirations are unlabored. No use of accessory muscles. Clear to auscultation bilaterally without wheezing, rales, or rhonchi.  GI: Abdomen soft, nontender, nondistended. No HSM appreciated.   EXTREM: No LE edema. No cyanosis or clubbing.  NEURO: Alert and oriented, cooperative. No obvious focal deficits.   PSYCH: Normal  affect.  SKIN: Warm and dry. No rashes or petechiae appreciated.          Past Medical History:     Past Medical History:   Diagnosis Date     Atrial fibrillation (H) 5/2012    with RVR.  Occurred POD #2 following carotid enarterectomy.     Depressive disorder      Elevated LFTs     occurred on lipitor; resolved off medications     Esophageal stenosis      Hyperlipidaemia      Hyperlipidemia      Irregular heart beat      New onset atrial fibrillation (H) 5/13/2012     Osteoarthrosis      PAD (peripheral artery disease) (H)      PVD (peripheral vascular disease) (H)     s/p carotid enarterectomy 5-10-12     SVT (supraventricular tachycardia) (H) 4/21/2020              Past Surgical History:     Past Surgical History:   Procedure Laterality Date     APPENDECTOMY      appy as a child     CLOSED REDUCTION WRIST Right 1/20/2016    Procedure: CLOSED REDUCTION WRIST;  Surgeon: Mina Brand MD;  Location: [facility] OR     COLONOSCOPY  8/6/2013    Procedure: COMBINED COLONOSCOPY, SINGLE BIOPSY/POLYPECTOMY BY BIOPSY;  COLONOSCOPY with polypectomy using cold forceps.;  Surgeon: Madeline Oviedo MD;  Location:  GI     COLONOSCOPY  8/29/2016    Dr. Oviedo [facility]     COLONOSCOPY N/A 8/29/2016    Procedure: COMBINED COLONOSCOPY, SINGLE OR MULTIPLE BIOPSY/POLYPECTOMY BY BIOPSY;  Surgeon: Madeline Oviedo MD;  Location: [facility] GI     ENDARTERECTOMY CAROTID  5/10/2012    LEFT, WITH EEG ; Surgeon:SELINA HANKS; Location: OR     ENT SURGERY      T&A as a child     HC CORRECT BUNION,SIMPLE      Right     HC REPAIR ROTATOR CUFF,ACUTE  2000    Right     HC REPAIR ROTATOR CUFF,ACUTE  2002    Left     HYSTERECTOMY, VAGINAL      simple      rt foot hammer toe repair  2005     ZZC HILLIARD W/O FACETEC FORAMOT/DSKC 1/2 VRT SEG, LUMBAR      L4              Allergies:   Patient has no known allergies.       Data:   All laboratory data reviewed:    LAST CHOLESTEROL:  Lab Results   Component Value Date    CHOL 161 04/23/2021     Lab Results    Component Value Date    HDL 73 04/23/2021     Lab Results   Component Value Date    LDL 76 04/23/2021     Lab Results   Component Value Date    TRIG 60 04/23/2021     Lab Results   Component Value Date    CHOLHDLRATIO 2.6 02/09/2015       LAST BMP:  Lab Results   Component Value Date     02/25/2022     04/23/2021      Lab Results   Component Value Date    POTASSIUM 3.8 02/25/2022    POTASSIUM 4.3 04/23/2021     Lab Results   Component Value Date    CHLORIDE 104 02/25/2022    CHLORIDE 98 04/23/2021     Lab Results   Component Value Date    MABEL 8.8 02/25/2022    MABEL 9.1 04/23/2021     Lab Results   Component Value Date    CO2 26 02/25/2022    CO2 29 04/23/2021     Lab Results   Component Value Date    BUN 17 02/25/2022    BUN 11 04/23/2021     Lab Results   Component Value Date    CR 0.82 02/25/2022    CR 0.68 04/23/2021     Lab Results   Component Value Date    GLC 96 02/25/2022    GLC 95 04/23/2021       LAST CBC:  Lab Results   Component Value Date    WBC 6.0 02/25/2022    WBC 6.1 11/29/2020     Lab Results   Component Value Date    RBC 3.70 02/25/2022    RBC 3.75 11/29/2020     Lab Results   Component Value Date    HGB 12.1 02/25/2022    HGB 12.5 04/23/2021     Lab Results   Component Value Date    HCT 37.2 02/25/2022    HCT 37.6 11/29/2020     Lab Results   Component Value Date     02/25/2022     11/29/2020     Lab Results   Component Value Date    MCH 32.7 02/25/2022    MCH 32.8 11/29/2020     Lab Results   Component Value Date    MCHC 32.5 02/25/2022    MCHC 32.7 11/29/2020     Lab Results   Component Value Date    RDW 11.9 02/25/2022    RDW 11.9 11/29/2020     Lab Results   Component Value Date     02/25/2022     11/29/2020

## 2022-04-01 ENCOUNTER — APPOINTMENT (OUTPATIENT)
Dept: CT IMAGING | Facility: CLINIC | Age: 87
End: 2022-04-01
Attending: EMERGENCY MEDICINE
Payer: COMMERCIAL

## 2022-04-01 ENCOUNTER — HOSPITAL ENCOUNTER (EMERGENCY)
Facility: CLINIC | Age: 87
Discharge: HOME | End: 2022-04-01
Attending: EMERGENCY MEDICINE | Admitting: EMERGENCY MEDICINE
Payer: COMMERCIAL

## 2022-04-01 VITALS
DIASTOLIC BLOOD PRESSURE: 87 MMHG | HEART RATE: 80 BPM | SYSTOLIC BLOOD PRESSURE: 145 MMHG | TEMPERATURE: 97.9 F | OXYGEN SATURATION: 99 % | RESPIRATION RATE: 20 BRPM

## 2022-04-01 DIAGNOSIS — I10 BENIGN ESSENTIAL HYPERTENSION: ICD-10-CM

## 2022-04-01 DIAGNOSIS — S16.1XXA STRAIN OF NECK MUSCLE, INITIAL ENCOUNTER: ICD-10-CM

## 2022-04-01 LAB
ANION GAP SERPL CALCULATED.3IONS-SCNC: 4 MMOL/L (ref 3–14)
ATRIAL RATE - MUSE: 81 BPM
BASOPHILS # BLD AUTO: 0.1 10E3/UL (ref 0–0.2)
BASOPHILS NFR BLD AUTO: 1 %
BUN SERPL-MCNC: 13 MG/DL (ref 7–30)
CALCIUM SERPL-MCNC: 9.3 MG/DL (ref 8.5–10.1)
CHLORIDE BLD-SCNC: 95 MMOL/L (ref 94–109)
CO2 SERPL-SCNC: 30 MMOL/L (ref 20–32)
CREAT SERPL-MCNC: 0.51 MG/DL (ref 0.52–1.04)
DIASTOLIC BLOOD PRESSURE - MUSE: NORMAL MMHG
EOSINOPHIL # BLD AUTO: 0 10E3/UL (ref 0–0.7)
EOSINOPHIL NFR BLD AUTO: 0 %
ERYTHROCYTE [DISTWIDTH] IN BLOOD BY AUTOMATED COUNT: 11.5 % (ref 10–15)
GFR SERPL CREATININE-BSD FRML MDRD: 90 ML/MIN/1.73M2
GLUCOSE BLD-MCNC: 114 MG/DL (ref 70–99)
HCT VFR BLD AUTO: 37.4 % (ref 35–47)
HGB BLD-MCNC: 12.2 G/DL (ref 11.7–15.7)
IMM GRANULOCYTES # BLD: 0 10E3/UL
IMM GRANULOCYTES NFR BLD: 0 %
INTERPRETATION ECG - MUSE: NORMAL
LYMPHOCYTES # BLD AUTO: 1.2 10E3/UL (ref 0.8–5.3)
LYMPHOCYTES NFR BLD AUTO: 10 %
MCH RBC QN AUTO: 33.1 PG (ref 26.5–33)
MCHC RBC AUTO-ENTMCNC: 32.6 G/DL (ref 31.5–36.5)
MCV RBC AUTO: 101 FL (ref 78–100)
MONOCYTES # BLD AUTO: 1.2 10E3/UL (ref 0–1.3)
MONOCYTES NFR BLD AUTO: 11 %
NEUTROPHILS # BLD AUTO: 9.3 10E3/UL (ref 1.6–8.3)
NEUTROPHILS NFR BLD AUTO: 78 %
NRBC # BLD AUTO: 0 10E3/UL
NRBC BLD AUTO-RTO: 0 /100
P AXIS - MUSE: 50 DEGREES
PLATELET # BLD AUTO: 241 10E3/UL (ref 150–450)
POTASSIUM BLD-SCNC: 3.9 MMOL/L (ref 3.4–5.3)
PR INTERVAL - MUSE: 196 MS
QRS DURATION - MUSE: 84 MS
QT - MUSE: 370 MS
QTC - MUSE: 429 MS
R AXIS - MUSE: -19 DEGREES
RBC # BLD AUTO: 3.69 10E6/UL (ref 3.8–5.2)
SODIUM SERPL-SCNC: 129 MMOL/L (ref 133–144)
SYSTOLIC BLOOD PRESSURE - MUSE: NORMAL MMHG
T AXIS - MUSE: 43 DEGREES
TROPONIN I SERPL HS-MCNC: 5 NG/L
VENTRICULAR RATE- MUSE: 81 BPM
WBC # BLD AUTO: 11.8 10E3/UL (ref 4–11)

## 2022-04-01 PROCEDURE — 70450 CT HEAD/BRAIN W/O DYE: CPT

## 2022-04-01 PROCEDURE — 250N000011 HC RX IP 250 OP 636: Performed by: EMERGENCY MEDICINE

## 2022-04-01 PROCEDURE — 96360 HYDRATION IV INFUSION INIT: CPT | Mod: 59

## 2022-04-01 PROCEDURE — 36415 COLL VENOUS BLD VENIPUNCTURE: CPT | Performed by: EMERGENCY MEDICINE

## 2022-04-01 PROCEDURE — 84484 ASSAY OF TROPONIN QUANT: CPT | Performed by: EMERGENCY MEDICINE

## 2022-04-01 PROCEDURE — 99285 EMERGENCY DEPT VISIT HI MDM: CPT | Mod: 25

## 2022-04-01 PROCEDURE — 96361 HYDRATE IV INFUSION ADD-ON: CPT

## 2022-04-01 PROCEDURE — 258N000003 HC RX IP 258 OP 636: Performed by: EMERGENCY MEDICINE

## 2022-04-01 PROCEDURE — 70496 CT ANGIOGRAPHY HEAD: CPT

## 2022-04-01 PROCEDURE — 80048 BASIC METABOLIC PNL TOTAL CA: CPT | Performed by: EMERGENCY MEDICINE

## 2022-04-01 PROCEDURE — 85025 COMPLETE CBC W/AUTO DIFF WBC: CPT | Performed by: EMERGENCY MEDICINE

## 2022-04-01 PROCEDURE — 250N000013 HC RX MED GY IP 250 OP 250 PS 637: Performed by: EMERGENCY MEDICINE

## 2022-04-01 PROCEDURE — 93005 ELECTROCARDIOGRAM TRACING: CPT

## 2022-04-01 RX ORDER — CYCLOBENZAPRINE HCL 10 MG
5 TABLET ORAL 3 TIMES DAILY PRN
Qty: 10 TABLET | Refills: 0 | Status: SHIPPED | OUTPATIENT
Start: 2022-04-01 | End: 2022-04-06

## 2022-04-01 RX ORDER — ACETAMINOPHEN 500 MG
1000 TABLET ORAL ONCE
Status: COMPLETED | OUTPATIENT
Start: 2022-04-01 | End: 2022-04-01

## 2022-04-01 RX ORDER — IOPAMIDOL 755 MG/ML
500 INJECTION, SOLUTION INTRAVASCULAR ONCE
Status: COMPLETED | OUTPATIENT
Start: 2022-04-01 | End: 2022-04-01

## 2022-04-01 RX ADMIN — IOPAMIDOL 70 ML: 755 INJECTION, SOLUTION INTRAVENOUS at 13:02

## 2022-04-01 RX ADMIN — SODIUM CHLORIDE 80 ML: 9 INJECTION, SOLUTION INTRAVENOUS at 13:02

## 2022-04-01 RX ADMIN — ACETAMINOPHEN 1000 MG: 500 TABLET, FILM COATED ORAL at 12:31

## 2022-04-01 ASSESSMENT — ENCOUNTER SYMPTOMS
NECK STIFFNESS: 1
ARTHRALGIAS: 1
BACK PAIN: 1
NECK PAIN: 1

## 2022-04-01 NOTE — ED PROVIDER NOTES
History   Chief Complaint:  Neck Pain     The history is provided by the patient.      Jennifer Whiteside is a 87 year old female on Eliquis with history of atrial fibrillation, hyperlipidemia, and bilateral carotid artery disease who presents with neck pain. Patient complains of constant neck pain ongoing for four or five days. It started with a sharp pain on the left side of her face that moved across to her right shoulder and down her neck and back. Her pain is worse when she first gets up out of bed but then will settle. She reports a friend looked at her back and noticed a lump. She is not able to turn her head to look over her shoulder. She also notes a dull pain of her left arm which started today. She has never had similar pain in the past. Denies any trauma or doing anything to cause the pain. No recent moving, gardening, lifting, or heavy cleaning. She has been using ice. She does not like to take pain medication but notes Tylenol does help. No overlying skin changes.     Review of Systems   Constitutional: Negative for chills and fever.   Musculoskeletal: Positive for arthralgias (right shoulder, left arm), back pain, neck pain and neck stiffness.   Neurological: Negative for weakness and numbness.   All other systems reviewed and are negative.    Allergies:  The patient has no known allergies.     Medications:  Fosamax   Eliquis   Ativan   Toprol-xl   Fish oil  Pravachol   Zoloft   Hygroton   Lopressor     Past Medical History:     Atrial fibrillation   Depressive disorder   Esophageal stenosis  Elevated lfts   Hyperlipidemia   Osteoarthrosis  Peripheral artery disease  Peripheral vascular disease  Supraventricular tachycardia   Dyspepsia   Bilateral carotid artery disease   Right lumbar radiculopathy       Past Surgical History:    Laminectomy   Right foot hammer toe repair   Vaginal hysterectomy   Repair rotator cuff x2   Correct bunion   Tonsillectomy and adenoidectomy   Endarterectomy  carotid   Colonoscopy x3   Closed reduction wrist   Appendectomy      Family History:    Brother: diabetes  Father: CAD ,cerebral hemorrhage  Mother: hypertension   Sister: arthritis, colon cancer  Son: rheumatic fever  Daughter: hypertension     Social History:  Presents to ED with her brother      Physical Exam     Patient Vitals for the past 24 hrs:   BP Temp Temp src Pulse Resp SpO2   04/01/22 1238 (!) 190/89 -- -- 83 18 98 %   04/01/22 0921 (!) 145/92 97.9  F (36.6  C) Temporal 96 20 98 %       Physical Exam  Nursing note and vitals reviewed.  HENT:   Mouth/Throat: Moist mucous membranes.   Eyes: EOMI, nonicteric sclera  Cardiovascular: Normal rate, regular rhythm, no murmurs, rubs, or gallops  Pulmonary/Chest: Effort normal and breath sounds normal. No respiratory distress. No wheezes. No rales.   Abdominal: Soft. Nontender, nondistended, no guarding or rigidity.   Musculoskeletal: Normal range of motion. Pain to palpation across posterior neck/paracervical musculature and trapezius muscles. Pain reproduced with palpation.   Neurological: Alert. Moves all extremities spontaneously. Equal  strength BUE, normal shoulder shrug.   Normal sensation median, ulnar, and radial nerve distributions bilaterally.   Skin: Skin is warm and dry. No rash noted.   Psychiatric: Normal mood and affect.       Emergency Department Course   ECG  ECG obtained at 0925, ECG read at 1201  Normal sinus rhythm  Possible Left atrial enlargement   Left ventricular hypertrophy   Abnormal ECG    No significant change as compared to prior, dated 2/25/22.  Rate 81 bpm. CA interval 196 ms. QRS duration 84 ms. QT/QTc 370/429 ms. P-R-T axes 50 -19 43.     Imaging:  CTA Head Neck with Contrast   Final Result   IMPRESSION:   HEAD CTA:   1.  No vessel stenosis, occlusion or aneurysm.      NECK CTA:   1.  No dissection or hemodynamically significant narrowing in the neck   by NASCET criteria.      LARA STANLEY MD            SYSTEM ID:   [id number]      Head CT w/o contrast   Final Result   IMPRESSION:   No intracranial hemorrhage, mass, or definite CT evidence of recent   ischemia.      LARA STANLEY MD            SYSTEM ID:  [id number]      Report per radiology    Laboratory:  Labs Ordered and Resulted from Time of ED Arrival to Time of ED Departure   BASIC METABOLIC PANEL - Abnormal       Result Value    Sodium 129 (*)     Potassium 3.9      Chloride 95      Carbon Dioxide (CO2) 30      Anion Gap 4      Urea Nitrogen 13      Creatinine 0.51 (*)     Calcium 9.3      Glucose 114 (*)     GFR Estimate 90     CBC WITH PLATELETS AND DIFFERENTIAL - Abnormal    WBC Count 11.8 (*)     RBC Count 3.69 (*)     Hemoglobin 12.2      Hematocrit 37.4       (*)     MCH 33.1 (*)     MCHC 32.6      RDW 11.5      Platelet Count 241      % Neutrophils 78      % Lymphocytes 10      % Monocytes 11      % Eosinophils 0      % Basophils 1      % Immature Granulocytes 0      NRBCs per 100 WBC 0      Absolute Neutrophils 9.3 (*)     Absolute Lymphocytes 1.2      Absolute Monocytes 1.2      Absolute Eosinophils 0.0      Absolute Basophils 0.1      Absolute Immature Granulocytes 0.0      Absolute NRBCs 0.0     TROPONIN I - Normal    Troponin I High Sensitivity 5          Emergency Department Course:     Reviewed:  I reviewed nursing notes, vitals, past medical history and Care Everywhere    Assessments:  1155 I obtained history and examined the patient as noted above.   1505 I rechecked the patient and explained findings.     Interventions:  1231 Tylenol 1000 mg PO     Disposition:  The patient was discharged to home.     Impression & Plan       Medical Decision Making:  Patient presents with several day history of posterior neck pain.  By history and exam, this appears most consistent with musculoskeletal etiology given the tight musculature in the reproduction of the pain.  However, given patient's age and comorbidities, more emergent etiology was considered  such as dissection, SAH, among others.  Fortunately, imaging negative for acute etiology.  Labs notable for mild hyponatremia which patient has a chronic history.  Troponin negative and given duration of symptoms this rules out ACS.  With negative imaging and negative labs, no emergent etiology is evident.  Discussed with patient that she should immediately return to the emergency department for worsening symptoms.  Also recommended follow-up with primary care with potential for physical therapy follow-up as well.  Will prescribe small dose of muscle relaxer to see if patient finds benefit with this. She is in stable condition at the time of discharge, indications for return to the ED were discussed as well as follow up. All questions were answered and she is in agreement with the plan.      Diagnosis:    ICD-10-CM    1. Strain of neck muscle, initial encounter  S16.1XXA    2. Benign essential hypertension  I10        Discharge Medications:  New Prescriptions    CYCLOBENZAPRINE (FLEXERIL) 10 MG TABLET    Take 0.5 tablets (5 mg) by mouth 3 times daily as needed for muscle spasms       Scribe Disclosure:  Petar CASTRO, am serving as a scribe at 11:44 AM on 4/1/2022 to document services personally performed by Anthony Lind MD based on my observations and the provider's statements to me.            Anthony Lind MD  04/02/22 [id number]

## 2022-04-01 NOTE — ED TRIAGE NOTES
x4 days neck pain, right shoulder, and back pain. This morning left arm started hurting. Unable to turn head per patient. Denies trauma or exertion.

## 2022-04-02 ASSESSMENT — ENCOUNTER SYMPTOMS
CHILLS: 0
FEVER: 0
WEAKNESS: 0
NUMBNESS: 0

## 2022-04-07 ENCOUNTER — HOSPITAL ENCOUNTER (OUTPATIENT)
Dept: CARDIOLOGY | Facility: CLINIC | Age: 87
Discharge: HOME | End: 2022-04-07
Attending: INTERNAL MEDICINE | Admitting: INTERNAL MEDICINE
Payer: COMMERCIAL

## 2022-04-07 DIAGNOSIS — I48.0 PAROXYSMAL A-FIB (H): ICD-10-CM

## 2022-04-07 LAB — LVEF ECHO: NORMAL

## 2022-04-07 PROCEDURE — 93306 TTE W/DOPPLER COMPLETE: CPT | Mod: 26 | Performed by: INTERNAL MEDICINE

## 2022-04-07 PROCEDURE — 93306 TTE W/DOPPLER COMPLETE: CPT

## 2022-04-08 ENCOUNTER — OFFICE VISIT (OUTPATIENT)
Dept: CARDIOLOGY | Facility: CLINIC | Age: 87
End: 2022-04-08
Attending: NURSE PRACTITIONER
Payer: COMMERCIAL

## 2022-04-08 VITALS
HEIGHT: 64 IN | SYSTOLIC BLOOD PRESSURE: 130 MMHG | WEIGHT: 126 LBS | OXYGEN SATURATION: 99 % | DIASTOLIC BLOOD PRESSURE: 70 MMHG | BODY MASS INDEX: 21.51 KG/M2 | HEART RATE: 91 BPM

## 2022-04-08 DIAGNOSIS — E78.5 HYPERLIPIDEMIA LDL GOAL <70: ICD-10-CM

## 2022-04-08 DIAGNOSIS — R42 LIGHTHEADEDNESS: ICD-10-CM

## 2022-04-08 DIAGNOSIS — I10 BENIGN ESSENTIAL HYPERTENSION: ICD-10-CM

## 2022-04-08 DIAGNOSIS — I48.0 PAROXYSMAL ATRIAL FIBRILLATION (H): Primary | ICD-10-CM

## 2022-04-08 PROCEDURE — 99214 OFFICE O/P EST MOD 30 MIN: CPT | Performed by: NURSE PRACTITIONER

## 2022-04-08 NOTE — PROGRESS NOTES
Cardiology Clinic Progress Note  Jennifer Whiteside MRN# [id number]   YOB: 1935 Age: 87 year old     Reason For Visit:  2 week follow up    Primary Cardiologist:   Dr. Truong          History of Presenting Illness:      Jennifer Whiteside is a pleasant 87 year old patient who carries a past medical history significant for paroxysmal atrial fibrillation on anticoagulation SVT, hypertension, hyperlipidemia, bilateral carotid artery disease status post endarterectomy, PVD, and anxiety.    She was last seen on 3/24/2022 for evaluation of lightheadedness and dizziness.  Please refer to that office visit for more complete HPI.  She had positive orthostatic pressures with 140/76 sitting and suddenly dropping to 95/62 with standing.  Chlorthalidone was discontinued and metoprolol was switched to long-acting Toprol 25 mg in the evening.  She has since had a complete resolution of symptoms.     She returns to the office today for follow-up. She has been feeling well on a cardiac standpoint, until this morning when she noticed a brief episode of irregular heartbeats accompanied by lightheadedness and palpitations.  She states she was having breakfast with a few lady friends when she became quite symptomatic and anxious, requiring assistance back to her apartment.  She laid down for a few minutes and symptoms resolved.  She states she is quite an anxious person and has been given lorazepam by her PCP to assist with anxiety.  She has been hesitant to use this.  At present, she offers no cardiac complaint,  denies shortness of breath, chest pain, palpitations, PND, orthopnea, presyncope, syncope, or lower extremity edema.    Echocardiogram yesterday showed a normal ejection fraction estimated at 60 to 65%, grade 1 diastolic dysfunction, mild tricuspid regurgitation, and elevated RV systolic pressure consistent with mild pulmonary hypertension.    Blood pressures well controlled at 130/70, consistent  with home pressures.  BMI 21.8    Follows a heart healthy diet.  Drinks 1 glass of wine every evening.  Remains compliant with all medications.                   Assessment and Plan:     1.  Paroxysmal atrial fibrillation -likely a brief episode this a.m. Currently in sinus rhythm, managed on metoprolol and Eliquis for stroke prevention.  Continue on current medical therapy.  She has been instructed to monitor for any recurrent symptoms of palpitations lasting longer duration or becoming more frequent.     2.  Lightheadedness -resolved after stopping chlorthalidone  3.  Hypertension -well-controlled on Toprol  4.  Hyperlipidemia -on statin  5.  Anxiety - PRN  lorazepam           Thank you for allowing me to participate in this delightful patient's care. I have recommended she follow up in 6 month with [facility] or sooner if needed.      Cathie oJnes, PAVITHRA CNP         Review of Systems:     Review of Systems:  Skin:  Positive for bruising   Eyes:  Positive for glaucoma;visual blurring  ENT:  Positive for hearing loss  Respiratory:  Positive for cough;shortness of breath  Cardiovascular:    Positive for;lightheadedness;dizziness;fatigue  Gastroenterology: Negative    Genitourinary:  not assessed    Musculoskeletal:  Positive for back pain  Neurologic:  Positive for numbness or tingling of feet  Psychiatric:  Negative    Heme/Lymph/Imm:  Negative    Endocrine:  Negative                Physical Exam:     GEN:  In general, this is a well nourished elderly female in no acute distress.  HEENT:  Pupils equal, round. Sclerae nonicteric. Clear oropharynx. Mucous membranes moist.  NECK: Supple, no masses appreciated. Trachea midline. No JVD   C/V:  Regular rate and rhythm, No murmur, rub or gallop. No S3 or RV heave.   RESP: Respirations are unlabored. No use of accessory muscles. Clear to auscultation bilaterally without wheezing, rales, or rhonchi.  GI: Abdomen soft, nontender, nondistended. No HSM appreciated.   EXTREM: No LE  edema. No cyanosis or clubbing.  NEURO: Alert and oriented, cooperative. No obvious focal deficits.   PSYCH: Normal affect.  SKIN: Warm and dry. No rashes or petechiae appreciated.          Past Medical History:     Past Medical History:   Diagnosis Date     Atrial fibrillation (H) 5/2012    with RVR.  Occurred POD #2 following carotid enarterectomy.     Depressive disorder      Elevated LFTs     occurred on lipitor; resolved off medications     Esophageal stenosis      Hyperlipidaemia      Hyperlipidemia      Irregular heart beat      New onset atrial fibrillation (H) 5/13/2012     Osteoarthrosis      PAD (peripheral artery disease) (H)      PVD (peripheral vascular disease) (H)     s/p carotid enarterectomy 5-10-12     SVT (supraventricular tachycardia) (H) 4/21/2020              Past Surgical History:     Past Surgical History:   Procedure Laterality Date     APPENDECTOMY      appy as a child     CLOSED REDUCTION WRIST Right 1/20/2016    Procedure: CLOSED REDUCTION WRIST;  Surgeon: Mina Brand MD;  Location:  OR     COLONOSCOPY  8/6/2013    Procedure: COMBINED COLONOSCOPY, SINGLE BIOPSY/POLYPECTOMY BY BIOPSY;  COLONOSCOPY with polypectomy using cold forceps.;  Surgeon: Madeline Oviedo MD;  Location:  GI     COLONOSCOPY  8/29/2016    Dr. Oviedo [facility]     COLONOSCOPY N/A 8/29/2016    Procedure: COMBINED COLONOSCOPY, SINGLE OR MULTIPLE BIOPSY/POLYPECTOMY BY BIOPSY;  Surgeon: Madeline Oviedo MD;  Location:  GI     ENDARTERECTOMY CAROTID  5/10/2012    LEFT, WITH EEG ; Surgeon:SELINA HANKS; Location: OR     ENT SURGERY      T&A as a child     HC CORRECT BUNION,SIMPLE      Right     HC REPAIR ROTATOR CUFF,ACUTE  2000    Right     HC REPAIR ROTATOR CUFF,ACUTE  2002    Left     HYSTERECTOMY, VAGINAL      simple      rt foot hammer toe repair  2005     ZZC HILLIARD W/O FACETEC FORAMOT/[facility] 1/2 VRT SEG, LUMBAR      L4              Allergies:   Patient has no known allergies.       Data:   All laboratory  data reviewed:    LAST CHOLESTEROL:  Lab Results   Component Value Date    CHOL 161 04/23/2021     Lab Results   Component Value Date    HDL 73 04/23/2021     Lab Results   Component Value Date    LDL 76 04/23/2021     Lab Results   Component Value Date    TRIG 60 04/23/2021     Lab Results   Component Value Date    CHOLHDLRATIO 2.6 02/09/2015       LAST BMP:  Lab Results   Component Value Date     02/25/2022     04/23/2021      Lab Results   Component Value Date    POTASSIUM 3.8 02/25/2022    POTASSIUM 4.3 04/23/2021     Lab Results   Component Value Date    CHLORIDE 104 02/25/2022    CHLORIDE 98 04/23/2021     Lab Results   Component Value Date    MABEL 8.8 02/25/2022    MABEL 9.1 04/23/2021     Lab Results   Component Value Date    CO2 26 02/25/2022    CO2 29 04/23/2021     Lab Results   Component Value Date    BUN 17 02/25/2022    BUN 11 04/23/2021     Lab Results   Component Value Date    CR 0.82 02/25/2022    CR 0.68 04/23/2021     Lab Results   Component Value Date    GLC 96 02/25/2022    GLC 95 04/23/2021       LAST CBC:  Lab Results   Component Value Date    WBC 6.0 02/25/2022    WBC 6.1 11/29/2020     Lab Results   Component Value Date    RBC 3.70 02/25/2022    RBC 3.75 11/29/2020     Lab Results   Component Value Date    HGB 12.1 02/25/2022    HGB 12.5 04/23/2021     Lab Results   Component Value Date    HCT 37.2 02/25/2022    HCT 37.6 11/29/2020     Lab Results   Component Value Date     02/25/2022     11/29/2020     Lab Results   Component Value Date    MCH 32.7 02/25/2022    MCH 32.8 11/29/2020     Lab Results   Component Value Date    MCHC 32.5 02/25/2022    MCHC 32.7 11/29/2020     Lab Results   Component Value Date    RDW 11.9 02/25/2022    RDW 11.9 11/29/2020     Lab Results   Component Value Date     02/25/2022     11/29/2020

## 2022-04-08 NOTE — LETTER
4/8/2022    Carisa Waggoner MD  [address] [facility] [address]  [facility] [address]    RE: Jennifer Whiteside       Dear Colleague,     I had the pleasure of seeing Jennifer Whiteside in the [facility].   Cardiology Clinic Progress Note  Jennifer Whiteside MRN# [id number]   YOB: 1935 Age: 87 year old     Reason For Visit:  2 week follow up    Primary Cardiologist:   Dr. Truong          History of Presenting Illness:      Jennifer Whiteside is a pleasant 87 year old patient who carries a past medical history significant for paroxysmal atrial fibrillation on anticoagulation SVT, hypertension, hyperlipidemia, bilateral carotid artery disease status post endarterectomy, PVD, and anxiety.    She was last seen on 3/24/2022 for evaluation of lightheadedness and dizziness.  Please refer to that office visit for more complete HPI.  She had positive orthostatic pressures with 140/76 sitting and suddenly dropping to 95/62 with standing.  Chlorthalidone was discontinued and metoprolol was switched to long-acting Toprol 25 mg in the evening.  She has since had a complete resolution of symptoms.     She returns to the office today for follow-up. She has been feeling well on a cardiac standpoint, until this morning when she noticed a brief episode of irregular heartbeats accompanied by lightheadedness and palpitations.  She states she was having breakfast with a few lady friends when she became quite symptomatic and anxious, requiring assistance back to her apartment.  She laid down for a few minutes and symptoms resolved.  She states she is quite an anxious person and has been given lorazepam by her PCP to assist with anxiety.  She has been hesitant to use this.  At present, she offers no cardiac complaint,  denies shortness of breath, chest pain, palpitations, PND, orthopnea, presyncope, syncope, or lower extremity edema.    Echocardiogram yesterday showed a normal ejection  fraction estimated at 60 to 65%, grade 1 diastolic dysfunction, mild tricuspid regurgitation, and elevated RV systolic pressure consistent with mild pulmonary hypertension.    Blood pressures well controlled at 130/70, consistent with home pressures.  BMI 21.8    Follows a heart healthy diet.  Drinks 1 glass of wine every evening.  Remains compliant with all medications.                   Assessment and Plan:     1.  Paroxysmal atrial fibrillation -likely a brief episode this a.m. Currently in sinus rhythm, managed on metoprolol and Eliquis for stroke prevention.  Continue on current medical therapy.  She has been instructed to monitor for any recurrent symptoms of palpitations lasting longer duration or becoming more frequent.     2.  Lightheadedness -resolved after stopping chlorthalidone  3.  Hypertension -well-controlled on Toprol  4.  Hyperlipidemia -on statin  5.  Anxiety - PRN  lorazepam           Thank you for allowing me to participate in this delightful patient's care. I have recommended she follow up in 6 month with [facility] or sooner if needed.      Cathie Jones, PAVITHRA CNP         Review of Systems:     Review of Systems:  Skin:  Positive for bruising   Eyes:  Positive for glaucoma;visual blurring  ENT:  Positive for hearing loss  Respiratory:  Positive for cough;shortness of breath  Cardiovascular:    Positive for;lightheadedness;dizziness;fatigue  Gastroenterology: Negative    Genitourinary:  not assessed    Musculoskeletal:  Positive for back pain  Neurologic:  Positive for numbness or tingling of feet  Psychiatric:  Negative    Heme/Lymph/Imm:  Negative    Endocrine:  Negative                Physical Exam:     GEN:  In general, this is a well nourished elderly female in no acute distress.  HEENT:  Pupils equal, round. Sclerae nonicteric. Clear oropharynx. Mucous membranes moist.  NECK: Supple, no masses appreciated. Trachea midline. No JVD   C/V:  Regular rate and rhythm, No murmur, rub or gallop. No S3  or RV heave.   RESP: Respirations are unlabored. No use of accessory muscles. Clear to auscultation bilaterally without wheezing, rales, or rhonchi.  GI: Abdomen soft, nontender, nondistended. No HSM appreciated.   EXTREM: No LE edema. No cyanosis or clubbing.  NEURO: Alert and oriented, cooperative. No obvious focal deficits.   PSYCH: Normal affect.  SKIN: Warm and dry. No rashes or petechiae appreciated.          Past Medical History:     Past Medical History:   Diagnosis Date     Atrial fibrillation (H) 5/2012    with RVR.  Occurred POD #2 following carotid enarterectomy.     Depressive disorder      Elevated LFTs     occurred on lipitor; resolved off medications     Esophageal stenosis      Hyperlipidaemia      Hyperlipidemia      Irregular heart beat      New onset atrial fibrillation (H) 5/13/2012     Osteoarthrosis      PAD (peripheral artery disease) (H)      PVD (peripheral vascular disease) (H)     s/p carotid enarterectomy 5-10-12     SVT (supraventricular tachycardia) (H) 4/21/2020              Past Surgical History:     Past Surgical History:   Procedure Laterality Date     APPENDECTOMY      appy as a child     CLOSED REDUCTION WRIST Right 1/20/2016    Procedure: CLOSED REDUCTION WRIST;  Surgeon: Mina Brand MD;  Location:  OR     COLONOSCOPY  8/6/2013    Procedure: COMBINED COLONOSCOPY, SINGLE BIOPSY/POLYPECTOMY BY BIOPSY;  COLONOSCOPY with polypectomy using cold forceps.;  Surgeon: Madeline Oviedo MD;  Location:  GI     COLONOSCOPY  8/29/2016    Dr. Oviedo [facility]     COLONOSCOPY N/A 8/29/2016    Procedure: COMBINED COLONOSCOPY, SINGLE OR MULTIPLE BIOPSY/POLYPECTOMY BY BIOPSY;  Surgeon: Madeline Oviedo MD;  Location:  GI     ENDARTERECTOMY CAROTID  5/10/2012    LEFT, WITH EEG ; Surgeon:SELINA HANKS; Location: OR     ENT SURGERY      T&A as a child     HC CORRECT BUNION,SIMPLE      Right     HC REPAIR ROTATOR CUFF,ACUTE  2000    Right     HC REPAIR ROTATOR CUFF,ACUTE  2002     Left     HYSTERECTOMY, VAGINAL      simple      rt foot hammer toe repair  2005     ZZC HILLIARD W/O FACETEC FORAMOT/DSKC 1/2 VRT SEG, LUMBAR      L4              Allergies:   Patient has no known allergies.       Data:   All laboratory data reviewed:    LAST CHOLESTEROL:  Lab Results   Component Value Date    CHOL 161 04/23/2021     Lab Results   Component Value Date    HDL 73 04/23/2021     Lab Results   Component Value Date    LDL 76 04/23/2021     Lab Results   Component Value Date    TRIG 60 04/23/2021     Lab Results   Component Value Date    CHOLHDLRATIO 2.6 02/09/2015       LAST BMP:  Lab Results   Component Value Date     02/25/2022     04/23/2021      Lab Results   Component Value Date    POTASSIUM 3.8 02/25/2022    POTASSIUM 4.3 04/23/2021     Lab Results   Component Value Date    CHLORIDE 104 02/25/2022    CHLORIDE 98 04/23/2021     Lab Results   Component Value Date    MABEL 8.8 02/25/2022    MABEL 9.1 04/23/2021     Lab Results   Component Value Date    CO2 26 02/25/2022    CO2 29 04/23/2021     Lab Results   Component Value Date    BUN 17 02/25/2022    BUN 11 04/23/2021     Lab Results   Component Value Date    CR 0.82 02/25/2022    CR 0.68 04/23/2021     Lab Results   Component Value Date    GLC 96 02/25/2022    GLC 95 04/23/2021       LAST CBC:  Lab Results   Component Value Date    WBC 6.0 02/25/2022    WBC 6.1 11/29/2020     Lab Results   Component Value Date    RBC 3.70 02/25/2022    RBC 3.75 11/29/2020     Lab Results   Component Value Date    HGB 12.1 02/25/2022    HGB 12.5 04/23/2021     Lab Results   Component Value Date    HCT 37.2 02/25/2022    HCT 37.6 11/29/2020     Lab Results   Component Value Date     02/25/2022     11/29/2020     Lab Results   Component Value Date    MCH 32.7 02/25/2022    MCH 32.8 11/29/2020     Lab Results   Component Value Date    MCHC 32.5 02/25/2022    MCHC 32.7 11/29/2020     Lab Results   Component Value Date    RDW 11.9 02/25/2022    RDW 11.9  11/29/2020     Lab Results   Component Value Date     02/25/2022     11/29/2020       Thank you for allowing me to participate in the care of your patient.      Sincerely,     PAVITHRA Charles CNP     [facility]  cc:   PAVITHRA Charles CNP  [address] [place] [address]

## 2022-04-13 DIAGNOSIS — R03.0 ELEVATED BLOOD PRESSURE READING WITHOUT DIAGNOSIS OF HYPERTENSION: ICD-10-CM

## 2022-04-13 DIAGNOSIS — I48.0 PAROXYSMAL ATRIAL FIBRILLATION (H): ICD-10-CM

## 2022-04-13 RX ORDER — METOPROLOL SUCCINATE 25 MG/1
25 TABLET, EXTENDED RELEASE ORAL EVERY EVENING
Qty: 90 TABLET | Refills: 1 | Status: SHIPPED | OUTPATIENT
Start: 2022-04-13 | End: 2022-10-04

## 2022-04-22 DIAGNOSIS — E78.5 HYPERLIPIDEMIA LDL GOAL <70: ICD-10-CM

## 2022-04-22 RX ORDER — PRAVASTATIN SODIUM 40 MG
40 TABLET ORAL DAILY
Qty: 90 TABLET | Refills: 3 | Status: SHIPPED | OUTPATIENT
Start: 2022-04-22 | End: 2023-04-21

## 2022-04-28 DIAGNOSIS — F41.9 ANXIETY: ICD-10-CM

## 2022-05-18 NOTE — PLAN OF CARE
Anesthesia Post Evaluation    Patient: Devante Jimenez    Procedure(s) Performed: Procedure(s) (LRB):  MYRINGOTOMY, WITH TYMPANOSTOMY TUBE INSERTION (Bilateral)  CIRCUMCISION, PEDIATRIC (N/A)  SCROTOPLASTY (N/A)  PLICATION, PENIS (N/A)    Final Anesthesia Type: general      Patient location during evaluation: PACU  Patient participation: Yes- Able to Participate  Level of consciousness: awake and alert  Post-procedure vital signs: reviewed and stable  Pain management: adequate  Airway patency: patent    PONV status at discharge: No PONV  Anesthetic complications: no      Cardiovascular status: blood pressure returned to baseline  Respiratory status: unassisted, spontaneous ventilation and room air  Hydration status: euvolemic  Follow-up not needed.          Vitals Value Taken Time   BP 76/38 05/17/22 1547   Temp 36.7 °C (98.1 °F) 05/17/22 1700   Pulse 105 05/17/22 1700   Resp 26 05/17/22 1700   SpO2 99 % 05/17/22 1700         No case tracking events are documented in the log.      Pain/Francis Score: Presence of Pain: non-verbal indicators absent (5/17/2022  4:50 PM)         Summary: Pt here with chest pain  Orientation: A+Ox4  Activity Level: Up with SBA  Fall Risk: Yes  Behavior & Aggression Tool Color: Green  Pain Management: C/O 1/10 chest pain, heat pack applied and declined further intervention (Improving since admission per pt)  Diet: NPO starting at 0400 for jose francisco scan.   Bowel/Bladder: Voiding appropriately.  Drains/Devices: PIV  Tests/Procedures for next shift: jose francisco scan today, time not available   Anticipated DC date: Pending  Other Important Info: May need event monitor at discharge.

## 2022-06-26 ENCOUNTER — HEALTH MAINTENANCE LETTER (OUTPATIENT)
Age: 87
End: 2022-06-26

## 2022-10-04 ENCOUNTER — OFFICE VISIT (OUTPATIENT)
Dept: INTERNAL MEDICINE | Facility: CLINIC | Age: 87
End: 2022-10-04
Payer: COMMERCIAL

## 2022-10-04 VITALS
WEIGHT: 125.7 LBS | DIASTOLIC BLOOD PRESSURE: 72 MMHG | BODY MASS INDEX: 21.46 KG/M2 | TEMPERATURE: 96.7 F | SYSTOLIC BLOOD PRESSURE: 122 MMHG | HEART RATE: 85 BPM | OXYGEN SATURATION: 98 % | HEIGHT: 64 IN | RESPIRATION RATE: 20 BRPM

## 2022-10-04 DIAGNOSIS — I48.0 PAROXYSMAL ATRIAL FIBRILLATION (H): ICD-10-CM

## 2022-10-04 DIAGNOSIS — I77.9 BILATERAL CAROTID ARTERY DISEASE, UNSPECIFIED TYPE (H): ICD-10-CM

## 2022-10-04 DIAGNOSIS — Z00.00 ENCOUNTER FOR ANNUAL WELLNESS EXAM IN MEDICARE PATIENT: Primary | ICD-10-CM

## 2022-10-04 DIAGNOSIS — R35.0 URINARY FREQUENCY: ICD-10-CM

## 2022-10-04 DIAGNOSIS — K40.90 LEFT INGUINAL HERNIA: ICD-10-CM

## 2022-10-04 DIAGNOSIS — M85.89 OSTEOPENIA OF MULTIPLE SITES: ICD-10-CM

## 2022-10-04 DIAGNOSIS — I10 BENIGN ESSENTIAL HYPERTENSION: ICD-10-CM

## 2022-10-04 DIAGNOSIS — E78.5 HYPERLIPIDEMIA LDL GOAL <70: ICD-10-CM

## 2022-10-04 DIAGNOSIS — F41.9 ANXIETY: ICD-10-CM

## 2022-10-04 DIAGNOSIS — I47.10 SVT (SUPRAVENTRICULAR TACHYCARDIA) (H): ICD-10-CM

## 2022-10-04 PROBLEM — M54.16 RIGHT LUMBAR RADICULOPATHY: Status: RESOLVED | Noted: 2021-11-29 | Resolved: 2022-10-04

## 2022-10-04 PROBLEM — R42 LIGHTHEADEDNESS: Status: RESOLVED | Noted: 2022-03-24 | Resolved: 2022-10-04

## 2022-10-04 PROBLEM — I95.9 HYPOTENSION, UNSPECIFIED HYPOTENSION TYPE: Status: RESOLVED | Noted: 2022-03-24 | Resolved: 2022-10-04

## 2022-10-04 PROCEDURE — 36415 COLL VENOUS BLD VENIPUNCTURE: CPT | Performed by: INTERNAL MEDICINE

## 2022-10-04 PROCEDURE — G0439 PPPS, SUBSEQ VISIT: HCPCS | Performed by: INTERNAL MEDICINE

## 2022-10-04 PROCEDURE — 82043 UR ALBUMIN QUANTITATIVE: CPT | Performed by: INTERNAL MEDICINE

## 2022-10-04 PROCEDURE — 80061 LIPID PANEL: CPT | Performed by: INTERNAL MEDICINE

## 2022-10-04 PROCEDURE — 80048 BASIC METABOLIC PNL TOTAL CA: CPT | Performed by: INTERNAL MEDICINE

## 2022-10-04 PROCEDURE — 99214 OFFICE O/P EST MOD 30 MIN: CPT | Mod: 25 | Performed by: INTERNAL MEDICINE

## 2022-10-04 RX ORDER — METOPROLOL SUCCINATE 25 MG/1
25 TABLET, EXTENDED RELEASE ORAL EVERY EVENING
Qty: 90 TABLET | Refills: 3 | Status: SHIPPED | OUTPATIENT
Start: 2022-10-04 | End: 2023-10-06

## 2022-10-04 RX ORDER — ALENDRONATE SODIUM 70 MG/1
TABLET ORAL
Qty: 12 TABLET | Refills: 1 | Status: SHIPPED | OUTPATIENT
Start: 2022-10-04 | End: 2023-05-23

## 2022-10-04 ASSESSMENT — ENCOUNTER SYMPTOMS
NERVOUS/ANXIOUS: 0
WEAKNESS: 0
FEVER: 0
DIZZINESS: 0
HEADACHES: 1
HEMATURIA: 0
ABDOMINAL PAIN: 0
PARESTHESIAS: 0
JOINT SWELLING: 0
HEARTBURN: 0
SORE THROAT: 0
COUGH: 0
CHILLS: 0
CONSTIPATION: 0
SHORTNESS OF BREATH: 0
FREQUENCY: 1
EYE PAIN: 0
NAUSEA: 0
ARTHRALGIAS: 0
BREAST MASS: 0
MYALGIAS: 0
PALPITATIONS: 0
DYSURIA: 0
HEMATOCHEZIA: 0
DIARRHEA: 0

## 2022-10-04 ASSESSMENT — ANXIETY QUESTIONNAIRES
2. NOT BEING ABLE TO STOP OR CONTROL WORRYING: NOT AT ALL
8. IF YOU CHECKED OFF ANY PROBLEMS, HOW DIFFICULT HAVE THESE MADE IT FOR YOU TO DO YOUR WORK, TAKE CARE OF THINGS AT HOME, OR GET ALONG WITH OTHER PEOPLE?: NOT DIFFICULT AT ALL
GAD7 TOTAL SCORE: 0
3. WORRYING TOO MUCH ABOUT DIFFERENT THINGS: NOT AT ALL
6. BECOMING EASILY ANNOYED OR IRRITABLE: NOT AT ALL
GAD7 TOTAL SCORE: 0
5. BEING SO RESTLESS THAT IT IS HARD TO SIT STILL: NOT AT ALL
7. FEELING AFRAID AS IF SOMETHING AWFUL MIGHT HAPPEN: NOT AT ALL
7. FEELING AFRAID AS IF SOMETHING AWFUL MIGHT HAPPEN: NOT AT ALL
4. TROUBLE RELAXING: NOT AT ALL
1. FEELING NERVOUS, ANXIOUS, OR ON EDGE: NOT AT ALL
IF YOU CHECKED OFF ANY PROBLEMS ON THIS QUESTIONNAIRE, HOW DIFFICULT HAVE THESE PROBLEMS MADE IT FOR YOU TO DO YOUR WORK, TAKE CARE OF THINGS AT HOME, OR GET ALONG WITH OTHER PEOPLE: NOT DIFFICULT AT ALL

## 2022-10-04 ASSESSMENT — ACTIVITIES OF DAILY LIVING (ADL): CURRENT_FUNCTION: NO ASSISTANCE NEEDED

## 2022-10-04 NOTE — PROGRESS NOTES
"SUBJECTIVE:   Keiko is a 87 year old who presents for Preventive Visit.      Patient has been advised of split billing requirements and indicates understanding: Yes  Are you in the first 12 months of your Medicare coverage?  No    Healthy Habits:     In general, how would you rate your overall health?  Good    Frequency of exercise:  2-3 days/week    Duration of exercise:  Less than 15 minutes    Do you usually eat at least 4 servings of fruit and vegetables a day, include whole grains    & fiber and avoid regularly eating high fat or \"junk\" foods?  Yes    Taking medications regularly:  Yes    Medication side effects:  Not applicable    Ability to successfully perform activities of daily living:  No assistance needed    Home Safety:  No safety concerns identified    Hearing Impairment:  No hearing concerns    In the past 6 months, have you been bothered by leaking of urine? Yes    In general, how would you rate your overall mental or emotional health?  Good      PHQ-2 Total Score: 0    Additional concerns today:  No    Problems:  1.  Atrial fibrillation, SVT: She has very rare fluttering sensations, no associated shortness of breath or lightheadedness.  She is tolerating anticoagulation and metoprolol well.  2.  Hypertension: Reports good blood pressures  3.  Hyperlipidemia: Tolerating pravastatin  4.  Left inguinal hernia: Slightly increased but not bothersome enough to consider surgery  5.  Osteopenia: Doing well on Fosamax, has been on it 4.5 years  6.  Anxiety: Well-controlled with her sertraline.          Patient Active Problem List   Diagnosis     Hyperlipidemia LDL goal <70     Advance Care Planning     Paroxysmal atrial fibrillation (H)     Bilateral carotid artery disease, unspecified type (H)     Osteopenia of multiple sites     SVT (supraventricular tachycardia) (H)     Benign essential hypertension     Left inguinal hernia     Urinary frequency     Current Outpatient Medications   Medication Sig Dispense " Refill     alendronate (FOSAMAX) 70 MG tablet Take one tablet by mouth once a week with 8 ounces water. 12 tablet 1     apixaban ANTICOAGULANT (ELIQUIS) 2.5 MG tablet Take 1 tablet (2.5 mg) by mouth 2 times daily 180 tablet 3     Tolerating GARLIC PO Take 1 tablet by mouth daily        latanoprost (XALATAN) 0.005 % ophthalmic solution Place 1 drop into both eyes At Bedtime       metoprolol succinate ER (TOPROL XL) 25 MG 24 hr tablet Take 1 tablet (25 mg) by mouth every evening 90 tablet 1     Multiple Vitamin (MULTI-VITAMIN) per tablet Take  by mouth. Equaline MVI - 1 daily 100 tablet 3     omega-3 fatty acids 1200 MG capsule Take 1 capsule by mouth 2 times daily.       polyethylene glycol-propylene glycol (SYSTANE ULTRA) 0.4-0.3 % SOLN ophthalmic solution Place 1 drop into both eyes 2 times daily       pravastatin (PRAVACHOL) 40 MG tablet Take 1 tablet (40 mg) by mouth daily 90 tablet 3     sertraline (ZOLOFT) 50 MG tablet TAKE ONE TABLET BY MOUTH ONE TIME DAILY 90 tablet 2     Vitamin D, Cholecalciferol, 25 MCG (1000 UT) TABS Take 25 mcg by mouth daily                 Do you feel safe in your environment? Yes    Have you ever done Advance Care Planning? (For example, a Health Directive, POLST, or a discussion with a medical provider or your loved ones about your wishes): Yes, advance care planning is on file.       Fall risk  Fallen 2 or more times in the past year?: No  Any fall with injury in the past year?: No    Cognitive Screening   1) Repeat 3 items (Leader, Season, Table)      2) Clock draw:   NORMAL  3) 3 item recall:   Recalls 3 objects  Results: 3 items recalled: COGNITIVE IMPAIRMENT LESS LIKELY    Mini-CogTM Copyright S Fidel. Licensed by the author for use in [facility]; reprinted with permission (justino@.[email or web address]). All rights reserved.      Do you have sleep apnea, excessive snoring or daytime drowsiness?: no    Reviewed and updated as needed this visit by clinical staff   Tobacco   Allergies  Meds   Med Hx  Surg Hx  Fam Hx  Soc Hx          Reviewed and updated as needed this visit by Provider                   Social History     Tobacco Use     Smoking status: Former Smoker     Packs/day: 0.50     Years: 3.00     Pack years: 1.50     Types: Cigarettes     Smokeless tobacco: Never Used     Tobacco comment: quit 1958   Substance Use Topics     Alcohol use: Yes     Comment: occ wine     If you drink alcohol do you typically have >3 drinks per day or >7 drinks per week? No    Alcohol Use 10/4/2022   Prescreen: >3 drinks/day or >7 drinks/week? No   Prescreen: >3 drinks/day or >7 drinks/week? -         Current providers sharing in care for this patient include:   Patient Care Team:  Carisa Waggoner MD as PCP - General (Internal Medicine)  Carisa Waggoner MD as Assigned PCP  Dilip Harper PA-C as Assigned Musculoskeletal Provider  Cathie Jones APRN CNP as Nurse Practitioner (Cardiovascular Disease)  Cathie Jones APRN CNP as Assigned Heart and Vascular Provider    The following health maintenance items are reviewed in Epic and correct as of today:  Health Maintenance   Topic Date Due     ANNUAL REVIEW OF HM ORDERS  Never done     COVID-19 Vaccine (5 - Booster for Moderna series) 07/27/2022     MEDICARE ANNUAL WELLNESS VISIT  10/04/2023     LEE ASSESSMENT  10/04/2023     FALL RISK ASSESSMENT  10/04/2023     ADVANCE CARE PLANNING  10/04/2027     DTAP/TDAP/TD IMMUNIZATION (4 - Td or Tdap) 04/23/2031     PHQ-2 (once per calendar year)  Completed     INFLUENZA VACCINE  Completed     Pneumococcal Vaccine: 65+ Years  Completed     ZOSTER IMMUNIZATION  Completed     IPV IMMUNIZATION  Aged Out     MENINGITIS IMMUNIZATION  Aged Out     HEPATITIS B IMMUNIZATION  Aged Out             Pertinent mammograms are reviewed under the imaging tab.    Review of Systems   Constitutional: Negative for chills and fever.   HENT: Negative for congestion, ear pain, hearing loss and sore throat.    Eyes:  "Negative for pain and visual disturbance.   Respiratory: Negative for cough and shortness of breath.    Cardiovascular: Negative for chest pain, palpitations and peripheral edema.   Gastrointestinal: Negative for abdominal pain, constipation, diarrhea, heartburn, hematochezia and nausea.   Breasts:  Negative for tenderness, breast mass and discharge.   Genitourinary: Positive for frequency. Negative for dysuria, genital sores, hematuria, pelvic pain, urgency, vaginal bleeding and vaginal discharge.   Musculoskeletal: Negative for arthralgias, joint swelling and myalgias.   Skin: Negative for rash.   Neurological: Positive for headaches. Negative for dizziness, weakness and paresthesias.   Psychiatric/Behavioral: Negative for mood changes. The patient is not nervous/anxious.      The headache is just some mild head pressure occasionally  The urinary frequency is not associated with any dysuria, odor hematuria.  She just wants to make sure her kidneys are doing okay.  It is not bothersome enough to consider treatment.      OBJECTIVE:   /72   Pulse 85   Temp (!) 96.7  F (35.9  C) (Tympanic)   Resp 20   Ht 1.619 m (5' 3.75\")   Wt 57 kg (125 lb 11.2 oz)   LMP  (LMP Unknown)   SpO2 98%   BMI 21.75 kg/m   Estimated body mass index is 21.75 kg/m  as calculated from the following:    Height as of this encounter: 1.619 m (5' 3.75\").    Weight as of this encounter: 57 kg (125 lb 11.2 oz).  Physical Exam    HEENT: extraocular movements are intact, pupils equal and reactive to light and accommodation, TMs clear  NECK: Neck supple. No adenopathy. Thyroid symmetric, normal size,, Carotids without bruits.  PULMONARY: clear to auscultation  CARDIAC: Regular S1, S2 with soft systolic ejection murmur, no S3, S4  PULSES: 2/2 throughout  BACK: no spinal or CVAT  ABDOMINAL: Soft, nontender.  Normal bowel sounds.  No hepatosplenomegaly or abnormal masses, fairly good sized hernia at left lower quadrant without " tenderness  BREAST: No breast masses or tenderness, No axillary masses or tenderness and No galactorrhea         ASSESSMENT / PLAN:   (Z00.00) Encounter for annual wellness exam in Medicare patient  (primary encounter diagnosis)  Comment: Flu shot done, she does not want to do the COVID booster today but encouraged her to consider this  Plan:     (I77.9) Bilateral carotid artery disease, unspecified type (H)  Comment: No bruits, last ultrasound showed less than 50% stenosis  Plan: Follow-up in a couple years    (I48.0) Paroxysmal atrial fibrillation (H)  Comment: Stable, continue medications  Plan: metoprolol succinate ER (TOPROL XL) 25 MG 24 hr        tablet, apixaban ANTICOAGULANT (ELIQUIS) 2.5 MG        tablet            (I47.1) SVT (supraventricular tachycardia) (H)  Comment: Stable with rare symptoms  Plan: Continue metoprolol    (I10) Benign essential hypertension  Comment: Well-controlled, continue medication  Plan: Basic metabolic panel  (Ca, Cl, CO2, Creat,         Gluc, K, Na, BUN), Albumin Random Urine         Quantitative with Creat Ratio            (E78.5) Hyperlipidemia LDL goal <70  Comment: Recheck lab, continue pravastatin  Plan: Lipid panel reflex to direct LDL Fasting            (M85.89) Osteopenia of multiple sites  Comment: Recommend she stop the alendronate in 6 months for 2-year drug holiday  Plan: alendronate (FOSAMAX) 70 MG tablet            (R35.0) Urinary frequency  Comment: Reassured that the blood and urine protein levels will tell us about her kidney function, would not do a urinalysis without other acute symptoms.  Does not wish for treatment for any frequency  Plan:     (K40.90) Left inguinal hernia  Comment: Present for some time, slowly getting bigger but not causing discomfort so would not refer her at this time  Plan:     (F41.9) Anxiety  Comment: Well-controlled, continue medication  Plan: sertraline (ZOLOFT) 50 MG tablet              Patient has been advised of split billing  "requirements and indicates understanding: Yes    COUNSELING:  Reviewed preventive health counseling, as reflected in patient instructions    Estimated body mass index is 21.75 kg/m  as calculated from the following:    Height as of this encounter: 1.619 m (5' 3.75\").    Weight as of this encounter: 57 kg (125 lb 11.2 oz).        She reports that she has quit smoking. Her smoking use included cigarettes. She has a 1.50 pack-year smoking history. She has never used smokeless tobacco.      Appropriate preventive services were discussed with this patient, including applicable screening as appropriate for cardiovascular disease, diabetes, osteopenia/osteoporosis, and glaucoma.  As appropriate for age/gender, discussed screening for colorectal cancer, prostate cancer, breast cancer, and cervical cancer. Checklist reviewing preventive services available has been given to the patient.    Reviewed patients plan of care and provided an AVS. The Basic Care Plan (routine screening as documented in Health Maintenance) for Jennifer meets the Care Plan requirement. This Care Plan has been established and reviewed with the Patient.    Counseling Resources:  ATP IV Guidelines  Pooled Cohorts Equation Calculator  Breast Cancer Risk Calculator  Breast Cancer: Medication to Reduce Risk  FRAX Risk Assessment  ICSI Preventive Guidelines  Dietary Guidelines for Americans, 2010  DCWafers's MyPlate  ASA Prophylaxis  Lung CA Screening    Carisa Waggoner MD  [facility]    Identified Health Risks:  Answers for HPI/ROS submitted by the patient on 10/4/2022  LEE 7 TOTAL SCORE: 0      "

## 2022-10-05 LAB
ANION GAP SERPL CALCULATED.3IONS-SCNC: 6 MMOL/L (ref 3–14)
BUN SERPL-MCNC: 13 MG/DL (ref 7–30)
CALCIUM SERPL-MCNC: 9.1 MG/DL (ref 8.5–10.1)
CHLORIDE BLD-SCNC: 102 MMOL/L (ref 94–109)
CHOLEST SERPL-MCNC: 155 MG/DL
CO2 SERPL-SCNC: 27 MMOL/L (ref 20–32)
CREAT SERPL-MCNC: 0.61 MG/DL (ref 0.52–1.04)
CREAT UR-MCNC: 152 MG/DL
FASTING STATUS PATIENT QL REPORTED: YES
GFR SERPL CREATININE-BSD FRML MDRD: 87 ML/MIN/1.73M2
GLUCOSE BLD-MCNC: 94 MG/DL (ref 70–99)
HDLC SERPL-MCNC: 73 MG/DL
LDLC SERPL CALC-MCNC: 67 MG/DL
MICROALBUMIN UR-MCNC: 22 MG/L
MICROALBUMIN/CREAT UR: 14.47 MG/G CR (ref 0–25)
NONHDLC SERPL-MCNC: 82 MG/DL
POTASSIUM BLD-SCNC: 4.8 MMOL/L (ref 3.4–5.3)
SODIUM SERPL-SCNC: 135 MMOL/L (ref 133–144)
TRIGL SERPL-MCNC: 75 MG/DL

## 2022-11-22 ENCOUNTER — OFFICE VISIT (OUTPATIENT)
Dept: CARDIOLOGY | Facility: CLINIC | Age: 87
End: 2022-11-22
Attending: NURSE PRACTITIONER
Payer: COMMERCIAL

## 2022-11-22 VITALS
SYSTOLIC BLOOD PRESSURE: 110 MMHG | BODY MASS INDEX: 21.85 KG/M2 | OXYGEN SATURATION: 98 % | DIASTOLIC BLOOD PRESSURE: 62 MMHG | HEART RATE: 85 BPM | HEIGHT: 64 IN | WEIGHT: 128 LBS

## 2022-11-22 DIAGNOSIS — I48.0 PAROXYSMAL ATRIAL FIBRILLATION (H): Primary | ICD-10-CM

## 2022-11-22 DIAGNOSIS — E78.5 HYPERLIPIDEMIA LDL GOAL <70: ICD-10-CM

## 2022-11-22 DIAGNOSIS — I10 BENIGN ESSENTIAL HYPERTENSION: ICD-10-CM

## 2022-11-22 DIAGNOSIS — I77.9 BILATERAL CAROTID ARTERY DISEASE, UNSPECIFIED TYPE (H): ICD-10-CM

## 2022-11-22 PROCEDURE — 99214 OFFICE O/P EST MOD 30 MIN: CPT | Performed by: NURSE PRACTITIONER

## 2022-11-22 NOTE — PROGRESS NOTES
Cardiology Clinic Progress Note  Jennifer Whitseide MRN# [id number]   YOB: 1935 Age: 87 year old     Reason For Visit:  6-month follow-up   Primary Cardiologist:   Dr. Truong          History of Presenting Illness:      Jennifer Whiteside is a pleasant 87 year old patient who carries a past medical history significant for paroxysmal atrial fibrillation on anticoagulation, SVT, hypertension, hyperlipidemia, bilateral carotid artery disease status post endarterectomy, PVD, and anxiety.  She returns to the office for a 6-month follow-up.    She was last seen on 4/8/2022 in follow-up to episodes of orthostatic hypotension relieved with the discontinuation of chlorthalidone.  Since then, she has done well on a cardiac standpoint, denies chest pain, shortness of breath, palpitations, PND, orthopnea, presyncope, or leg edema.  Upon exam, lungs are clear bilaterally, heart rate and rhythm regular, no palpitations, right carotid bruit, no lower extremity edema.    Last echocardiogram (4/7/2022 )showed a normal ejection fraction estimated at 60 to 65%, grade 1 diastolic dysfunction, mild tricuspid regurgitation, and elevated RV systolic pressure consistent with mild pulmonary hypertension.    Nuclear stress test in 2021 was negative for inducible myocardial ischemia or infarction.  CT of the head neck on 4/1/2022 showed no stenosis, occlusion or aneurysm.    Blood pressures is well controlled at 110/62, managed on metoprolol.  Last BMP was unremarkable  Lipid panel was excellent with a total cholesterol of 155, HDL 73, LDL 67, triglycerides 75, on pravastatin    Activity level is unchanged  Follows a heart healthy diet  Remains compliant with all medications.                   Assessment and Plan:     1.  Paroxysmal atrial fibrillation /SVT-no further recurrence.  Continue Toprol and Eliquis for stroke prevention.  2.  Hypertension -well-controlled   3.  Hyperlipidemia -LDL at goal, on statin  4.   Carotid disease -status post endarterectomy.  CT of the neck/head showed no stenosis, occlusion or aneurysm.  Continue statin and anticoagulation.  5.  Anxiety - PRN  lorazepam           Thank you for allowing me to participate in this delightful patient's care. I have recommended she follow up in 6 month with [facility] or sooner if needed.      Cathie Jones, APRN CNP         Review of Systems:     Review of Systems:  Skin:  not assessed     Eyes:  not assessed    ENT:  not assessed    Respiratory:  Negative    Cardiovascular:    Positive for  Gastroenterology: not assessed    Genitourinary:  not assessed    Musculoskeletal:  not assessed    Neurologic:  not assessed    Psychiatric:  not assessed    Heme/Lymph/Imm:  not assessed    Endocrine:  not assessed                Physical Exam:     GEN:  In general, this is a well nourished elderly female in no acute distress.  HEENT:  Pupils equal, round. Sclerae nonicteric. Clear oropharynx. Mucous membranes moist.  NECK: Supple, no masses appreciated. Trachea midline. No JVD   C/V:  Regular rate and rhythm, No murmur, rub or gallop. No S3 or RV heave.   RESP: Respirations are unlabored. No use of accessory muscles. Clear to auscultation bilaterally without wheezing, rales, or rhonchi.  GI: Abdomen soft, nontender, nondistended. No HSM appreciated.   EXTREM: No LE edema. No cyanosis or clubbing.  NEURO: Alert and oriented, cooperative. No obvious focal deficits.   PSYCH: Normal affect.  SKIN: Warm and dry. No rashes or petechiae appreciated.          Past Medical History:     Past Medical History:   Diagnosis Date     Atrial fibrillation (H) 5/2012    with RVR.  Occurred POD #2 following carotid enarterectomy.     Depressive disorder      Elevated LFTs     occurred on lipitor; resolved off medications     Esophageal stenosis      Hyperlipidaemia      Hyperlipidemia      Irregular heart beat      New onset atrial fibrillation (H) 5/13/2012     Osteoarthrosis      PAD  (peripheral artery disease) (H)      PVD (peripheral vascular disease) (H)     s/p carotid enarterectomy 5-10-12     SVT (supraventricular tachycardia) (H) 4/21/2020              Past Surgical History:     Past Surgical History:   Procedure Laterality Date     APPENDECTOMY      appy as a child     CLOSED REDUCTION WRIST Right 1/20/2016    Procedure: CLOSED REDUCTION WRIST;  Surgeon: Mina Brand MD;  Location: [facility] OR     COLONOSCOPY  8/6/2013    Procedure: COMBINED COLONOSCOPY, SINGLE BIOPSY/POLYPECTOMY BY BIOPSY;  COLONOSCOPY with polypectomy using cold forceps.;  Surgeon: Madeline Oviedo MD;  Location: [facility] GI     COLONOSCOPY  8/29/2016    Dr. Oviedo [facility]     COLONOSCOPY N/A 8/29/2016    Procedure: COMBINED COLONOSCOPY, SINGLE OR MULTIPLE BIOPSY/POLYPECTOMY BY BIOPSY;  Surgeon: aMdeline Oviedo MD;  Location: [facility] GI     ENDARTERECTOMY CAROTID  5/10/2012    LEFT, WITH EEG ; Surgeon:SELINA HANKS; Location: OR     ENT SURGERY      T&A as a child     HC CORRECT BUNION,SIMPLE      Right     HC REPAIR ROTATOR CUFF,ACUTE  2000    Right     HC REPAIR ROTATOR CUFF,ACUTE  2002    Left     HYSTERECTOMY, VAGINAL      simple      rt foot hammer toe repair  2005     ZZC HILLIARD W/O FACETEC FORAMOT/DSKC 1/2 VRT SEG, LUMBAR      L4              Allergies:   Patient has no known allergies.       Data:   All laboratory data reviewed:    LAST CHOLESTEROL:  Lab Results   Component Value Date    CHOL 161 04/23/2021     Lab Results   Component Value Date    HDL 73 04/23/2021     Lab Results   Component Value Date    LDL 76 04/23/2021     Lab Results   Component Value Date    TRIG 60 04/23/2021     Lab Results   Component Value Date    CHOLHDLRATIO 2.6 02/09/2015       LAST BMP:  Lab Results   Component Value Date     02/25/2022     04/23/2021      Lab Results   Component Value Date    POTASSIUM 3.8 02/25/2022    POTASSIUM 4.3 04/23/2021     Lab Results   Component Value Date    CHLORIDE 104 02/25/2022    CHLORIDE  98 04/23/2021     Lab Results   Component Value Date    MABEL 8.8 02/25/2022    MABEL 9.1 04/23/2021     Lab Results   Component Value Date    CO2 26 02/25/2022    CO2 29 04/23/2021     Lab Results   Component Value Date    BUN 17 02/25/2022    BUN 11 04/23/2021     Lab Results   Component Value Date    CR 0.82 02/25/2022    CR 0.68 04/23/2021     Lab Results   Component Value Date    GLC 96 02/25/2022    GLC 95 04/23/2021       LAST CBC:  Lab Results   Component Value Date    WBC 6.0 02/25/2022    WBC 6.1 11/29/2020     Lab Results   Component Value Date    RBC 3.70 02/25/2022    RBC 3.75 11/29/2020     Lab Results   Component Value Date    HGB 12.1 02/25/2022    HGB 12.5 04/23/2021     Lab Results   Component Value Date    HCT 37.2 02/25/2022    HCT 37.6 11/29/2020     Lab Results   Component Value Date     02/25/2022     11/29/2020     Lab Results   Component Value Date    MCH 32.7 02/25/2022    MCH 32.8 11/29/2020     Lab Results   Component Value Date    MCHC 32.5 02/25/2022    MCHC 32.7 11/29/2020     Lab Results   Component Value Date    RDW 11.9 02/25/2022    RDW 11.9 11/29/2020     Lab Results   Component Value Date     02/25/2022     11/29/2020

## 2022-11-22 NOTE — LETTER
11/22/2022    Carisa Waggoner MD  [address] [facility] [address]  [facility] [address]    RE: Jennifer Whiteside       Dear Colleague,     I had the pleasure of seeing Jennifer Whiteside in the [facility].   Cardiology Clinic Progress Note  Jennifer Whiteside MRN# [id number]   YOB: 1935 Age: 87 year old     Reason For Visit:  6-month follow-up   Primary Cardiologist:   Dr. Truong          History of Presenting Illness:      Jennifer Whiteside is a pleasant 87 year old patient who carries a past medical history significant for paroxysmal atrial fibrillation on anticoagulation, SVT, hypertension, hyperlipidemia, bilateral carotid artery disease status post endarterectomy, PVD, and anxiety.  She returns to the office for a 6-month follow-up.    She was last seen on 4/8/2022 in follow-up to episodes of orthostatic hypotension relieved with the discontinuation of chlorthalidone.  Since then, she has done well on a cardiac standpoint, denies chest pain, shortness of breath, palpitations, PND, orthopnea, presyncope, or leg edema.  Upon exam, lungs are clear bilaterally, heart rate and rhythm regular, no palpitations, right carotid bruit, no lower extremity edema.    Last echocardiogram (4/7/2022 )showed a normal ejection fraction estimated at 60 to 65%, grade 1 diastolic dysfunction, mild tricuspid regurgitation, and elevated RV systolic pressure consistent with mild pulmonary hypertension.    Nuclear stress test in 2021 was negative for inducible myocardial ischemia or infarction.  CT of the head neck on 4/1/2022 showed no stenosis, occlusion or aneurysm.    Blood pressures is well controlled at 110/62, managed on metoprolol.  Last BMP was unremarkable  Lipid panel was excellent with a total cholesterol of 155, HDL 73, LDL 67, triglycerides 75, on pravastatin    Activity level is unchanged  Follows a heart healthy diet  Remains compliant with all medications.                    Assessment and Plan:     1.  Paroxysmal atrial fibrillation /SVT-no further recurrence.  Continue Toprol and Eliquis for stroke prevention.  2.  Hypertension -well-controlled   3.  Hyperlipidemia -LDL at goal, on statin  4.  Carotid disease -status post endarterectomy.  CT of the neck/head showed no stenosis, occlusion or aneurysm.  Continue statin and anticoagulation.  5.  Anxiety - PRN  lorazepam           Thank you for allowing me to participate in this delightful patient's care. I have recommended she follow up in 6 month with [facility] or sooner if needed.      Cathie Jones, APRN CNP         Review of Systems:     Review of Systems:  Skin:  not assessed     Eyes:  not assessed    ENT:  not assessed    Respiratory:  Negative    Cardiovascular:    Positive for  Gastroenterology: not assessed    Genitourinary:  not assessed    Musculoskeletal:  not assessed    Neurologic:  not assessed    Psychiatric:  not assessed    Heme/Lymph/Imm:  not assessed    Endocrine:  not assessed                Physical Exam:     GEN:  In general, this is a well nourished elderly female in no acute distress.  HEENT:  Pupils equal, round. Sclerae nonicteric. Clear oropharynx. Mucous membranes moist.  NECK: Supple, no masses appreciated. Trachea midline. No JVD   C/V:  Regular rate and rhythm, No murmur, rub or gallop. No S3 or RV heave.   RESP: Respirations are unlabored. No use of accessory muscles. Clear to auscultation bilaterally without wheezing, rales, or rhonchi.  GI: Abdomen soft, nontender, nondistended. No HSM appreciated.   EXTREM: No LE edema. No cyanosis or clubbing.  NEURO: Alert and oriented, cooperative. No obvious focal deficits.   PSYCH: Normal affect.  SKIN: Warm and dry. No rashes or petechiae appreciated.          Past Medical History:     Past Medical History:   Diagnosis Date     Atrial fibrillation (H) 5/2012    with RVR.  Occurred POD #2 following carotid enarterectomy.     Depressive disorder      Elevated  LFTs     occurred on lipitor; resolved off medications     Esophageal stenosis      Hyperlipidaemia      Hyperlipidemia      Irregular heart beat      New onset atrial fibrillation (H) 5/13/2012     Osteoarthrosis      PAD (peripheral artery disease) (H)      PVD (peripheral vascular disease) (H)     s/p carotid enarterectomy 5-10-12     SVT (supraventricular tachycardia) (H) 4/21/2020              Past Surgical History:     Past Surgical History:   Procedure Laterality Date     APPENDECTOMY      appy as a child     CLOSED REDUCTION WRIST Right 1/20/2016    Procedure: CLOSED REDUCTION WRIST;  Surgeon: Mina Brand MD;  Location: [facility] OR     COLONOSCOPY  8/6/2013    Procedure: COMBINED COLONOSCOPY, SINGLE BIOPSY/POLYPECTOMY BY BIOPSY;  COLONOSCOPY with polypectomy using cold forceps.;  Surgeon: Madeline Oviedo MD;  Location:  GI     COLONOSCOPY  8/29/2016    Dr. Oviedo [facility]     COLONOSCOPY N/A 8/29/2016    Procedure: COMBINED COLONOSCOPY, SINGLE OR MULTIPLE BIOPSY/POLYPECTOMY BY BIOPSY;  Surgeon: Madeline Oviedo MD;  Location:  GI     ENDARTERECTOMY CAROTID  5/10/2012    LEFT, WITH EEG ; Surgeon:SELINA HANKS; Location: OR     ENT SURGERY      T&A as a child     HC CORRECT BUNION,SIMPLE      Right     HC REPAIR ROTATOR CUFF,ACUTE  2000    Right     HC REPAIR ROTATOR CUFF,ACUTE  2002    Left     HYSTERECTOMY, VAGINAL      simple      rt foot hammer toe repair  2005     ZZC HILLIARD W/O FACETEC FORAMOT/DSKC 1/2 VRT SEG, LUMBAR      L4              Allergies:   Patient has no known allergies.       Data:   All laboratory data reviewed:    LAST CHOLESTEROL:  Lab Results   Component Value Date    CHOL 161 04/23/2021     Lab Results   Component Value Date    HDL 73 04/23/2021     Lab Results   Component Value Date    LDL 76 04/23/2021     Lab Results   Component Value Date    TRIG 60 04/23/2021     Lab Results   Component Value Date    CHOLHDLRATIO 2.6 02/09/2015       LAST BMP:  Lab Results   Component  Value Date     02/25/2022     04/23/2021      Lab Results   Component Value Date    POTASSIUM 3.8 02/25/2022    POTASSIUM 4.3 04/23/2021     Lab Results   Component Value Date    CHLORIDE 104 02/25/2022    CHLORIDE 98 04/23/2021     Lab Results   Component Value Date    MABEL 8.8 02/25/2022    MABEL 9.1 04/23/2021     Lab Results   Component Value Date    CO2 26 02/25/2022    CO2 29 04/23/2021     Lab Results   Component Value Date    BUN 17 02/25/2022    BUN 11 04/23/2021     Lab Results   Component Value Date    CR 0.82 02/25/2022    CR 0.68 04/23/2021     Lab Results   Component Value Date    GLC 96 02/25/2022    GLC 95 04/23/2021       LAST CBC:  Lab Results   Component Value Date    WBC 6.0 02/25/2022    WBC 6.1 11/29/2020     Lab Results   Component Value Date    RBC 3.70 02/25/2022    RBC 3.75 11/29/2020     Lab Results   Component Value Date    HGB 12.1 02/25/2022    HGB 12.5 04/23/2021     Lab Results   Component Value Date    HCT 37.2 02/25/2022    HCT 37.6 11/29/2020     Lab Results   Component Value Date     02/25/2022     11/29/2020     Lab Results   Component Value Date    MCH 32.7 02/25/2022    MCH 32.8 11/29/2020     Lab Results   Component Value Date    MCHC 32.5 02/25/2022    MCHC 32.7 11/29/2020     Lab Results   Component Value Date    RDW 11.9 02/25/2022    RDW 11.9 11/29/2020     Lab Results   Component Value Date     02/25/2022     11/29/2020       Thank you for allowing me to participate in the care of your patient.      Sincerely,     PAVITHRA Charles CNP     [facility]  cc:   PAVITHRA Charles CNP  [address] [place] [address]

## 2022-11-22 NOTE — PATIENT INSTRUCTIONS
Thanks for participating in a office visit with the [facility] clinic today.    Doing well on a cardiac standpoint   Blood pressures well controlled   Rare occurrence of palpitation.   Continue current medical therapy.     Follow up in 6 months with T[facility]ONE    Please call my nurse at  [phone] with any questions or concerns.    Scheduling phone number: [phone]  Reminder: Please bring in all current medications, over the counter supplements and vitamin bottles to your next appointment.

## 2023-01-16 ENCOUNTER — NURSE TRIAGE (OUTPATIENT)
Dept: CARDIOLOGY | Facility: CLINIC | Age: 88
End: 2023-01-16
Payer: COMMERCIAL

## 2023-01-16 NOTE — TELEPHONE ENCOUNTER
Called patient to discuss concerns with lightheadedness.     Pt states she has been feeling lightheaded/dizzy in the mornings and usually improves as the day goes on. Feels better after noon (after around 2pm). Feels some SOB in the morning if she moves to quick but resolves within 1 min. BPs in the AM running 130s/80s. One BP was 108/70. Afternoon BPs running high.     Last night BP was 205/105 and after checking it a couple times BPs improved to 143/71. BP this morning at 135/82. Pt feels stable w/o symptoms right now. Main concern is the lightheadedness in the AM when waking up.     Pt has sxs of afib and SVT. Pt feels palpitations on and off but do not correlate with sxs of lightheadedness in the AM.     Pt takes Toprol XL 25 mg at bedtime. Hx of orthostatic hypotension and improved with discontinuation of chlorthalidone (4/8/22).     Routing to Cathie Jones CNP to review. KRISHNA Forrest

## 2023-01-16 NOTE — TELEPHONE ENCOUNTER
Keiko is not feeling well for the past few weeks. She feels unsteady, is dizzy and lightheaded from the time she wakes up until early afternoon. She checks her blood pressure about 7:30 AM and between 9-9:30 PM, usually once or twice during the day. Morning blood pressures: 134/83, 135/81, 108/70, 124/66, 132/76, 143/87. Did have one morning BP 80/58. Late afternoon-evening BPs are running high. She is very concerned and called for an appointment with Cathie but was transferred to Triage for her symptoms because they didn't see any openings for Cathie until the end of February. Please discuss her symptoms with Cathie and call her back this morning. She really wants to be seen this week.   Reason for Disposition    Patient wants to be seen    Systolic BP >= 160 OR Diastolic >= 100    Additional Information    Negative: Sounds like a life-threatening emergency to the triager    Negative: Symptom is main concern (e.g., headache, chest pain)    Negative: Low blood pressure is main concern    Negative: Systolic BP >= 160 OR Diastolic >= 100, and any cardiac or neurologic symptoms (e.g., chest pain, difficulty breathing, unsteady gait, blurred vision)    Negative: Pregnant 20 or more weeks (or postpartum < 6 weeks) with new hand or face swelling    Negative: Pregnant 20 or more weeks (or postpartum < 6 weeks) and Systolic BP >= 160 OR Diastolic >= 100    Negative: Patient sounds very sick or weak to the triager    Negative: Systolic BP >= 200 OR Diastolic >= 120 and having NO cardiac or neurologic symptoms    Negative: Pregnant 20 or more weeks (or postpartum < 6 weeks) with Systolic BP >= 140 OR Diastolic >= 90    Negative: Systolic BP >= 180 OR Diastolic >= 110, and missed most recent dose of blood pressure medication    Negative: Systolic BP >= 180 OR Diastolic >= 110    Negative: Ran out of BP medications    Negative: Taking BP medications and feels is having side effects (e.g., impotence, cough, dizziness)    Negative:  "Systolic BP >= 130 OR Diastolic >= 80, and pregnant    Answer Assessment - Initial Assessment Questions  1. BLOOD PRESSURE: \"What is the blood pressure?\" \"Did you take at least two measurements 5 minutes apart?\"      Mornings range from 108-143/66-87, Saturday evening  (1/14/23) between 9-9:30 's/90's; Sunday evening (1/15/23) between 9-9:30 PM /105  2. ONSET: \"When did you take your blood pressure?\"      Past few weeks.   3. HOW: \"How did you obtain the blood pressure?\" (e.g., visiting nurse, automatic home BP monitor)   home automatic cuff  4. HISTORY: \"Do you have a history of high blood pressure?\"      yes  5. MEDICATIONS: \"Are you taking any medications for blood pressure?\" \"Have you missed any doses recently?\"      Yes- on meds and no-has not missed doses.  6. OTHER SYMPTOMS: \"Do you have any symptoms?\" (e.g., headache, chest pain, blurred vision, difficulty breathing, weakness)     Dizzy, lightheaded, unsteady on her feet. Started using a walker 3 weeks ago. Just doesn't feel good.    Protocols used: BLOOD PRESSURE - HIGH-A-OH      "

## 2023-01-17 NOTE — TELEPHONE ENCOUNTER
"Spoke with pt regarding recommendations. Pt agrees with plan. Pt will call our scheduling line to see if there are openings if not she will follow up with PCP. No further questions at this time.     Cathie Jones, APRN CNP  You     \"Patient was recently seen with no complaints of lightheadedness. Is she hydrating well?   In review of reported blood pressure readings, BP is well controlled.   Recommend she make appointment for evaluation or also be seen by PCP.     Thanks,   Cathie\"        "

## 2023-01-27 ENCOUNTER — OFFICE VISIT (OUTPATIENT)
Dept: CARDIOLOGY | Facility: CLINIC | Age: 88
End: 2023-01-27
Attending: NURSE PRACTITIONER
Payer: COMMERCIAL

## 2023-01-27 VITALS
WEIGHT: 128 LBS | DIASTOLIC BLOOD PRESSURE: 60 MMHG | BODY MASS INDEX: 21.85 KG/M2 | HEART RATE: 90 BPM | OXYGEN SATURATION: 98 % | HEIGHT: 64 IN | SYSTOLIC BLOOD PRESSURE: 90 MMHG

## 2023-01-27 DIAGNOSIS — I48.0 PAROXYSMAL ATRIAL FIBRILLATION (H): ICD-10-CM

## 2023-01-27 DIAGNOSIS — R42 LIGHTHEADEDNESS: Primary | ICD-10-CM

## 2023-01-27 DIAGNOSIS — I95.9 HYPOTENSION, UNSPECIFIED HYPOTENSION TYPE: ICD-10-CM

## 2023-01-27 PROCEDURE — 99214 OFFICE O/P EST MOD 30 MIN: CPT | Performed by: NURSE PRACTITIONER

## 2023-01-27 NOTE — PATIENT INSTRUCTIONS
Thanks for participating in a office visit with the [facility] clinic today.    Blood pressures soft today accompanied by mild lightheadedness.   Last echocardiogram was stable.   Continue with current dose of metoprolol   Monitor blood pressures daily with a goal of < 140/90.   Encourage exercise and heart healthy diet     Follow up in 6 months with TYRONE     Please call my nurse at  [phone] with any questions or concerns.    Scheduling phone number: [phone]  Reminder: Please bring in all current medications, over the counter supplements and vitamin bottles to your next appointment.

## 2023-01-27 NOTE — PROGRESS NOTES
Cardiology Clinic Progress Note  Jennifer Whiteside MRN# [id number]   YOB: 1935 Age: 87 year old     Reason For Visit:  Hypertension    Primary Cardiologist:   Dr. Truong          History of Presenting Illness:      Jennifer Whiteside is a pleasant 87 year old patient who carries a past medical history significant for paroxysmal atrial fibrillation on anticoagulation, SVT, hypertension, hyperlipidemia, bilateral carotid artery disease status post endarterectomy, PVD, and anxiety.      She was last seen on 11/22/2022 for routine follow up. She offered no cardiac complaint and medications remained unchanged. She had previously had episodes of orthostatic hypotension which resolved after the discontinuation of chlorthalidone.  Please see that office visit for more complete HPI.    Last cardiac workup consisted of a echocardiogram (4/7/2022 ) that showed a normal ejection fraction estimated at 60 to 65%, grade 1 diastolic dysfunction, mild tricuspid regurgitation, and elevated RV systolic pressure consistent with mild pulmonary hypertension. Nuclear stress test in 2021 was negative for ischemia.     Over the last few days she has noticed her blood pressures elevated accompanied by lightheadedness.  She takes her blood pressures multiple times a day with readings ranging from 1 ' systolic with an occasional 160.  She became nervous and requested a clinical evaluation.    Today she presents feeling well on a cardiac standpoint, denies chest pain, shortness of breath, palpitations, PND, orthopnea, presyncope, or leg edema.  She occasionally feels lightheadedness and is recently started using a walker for better support.  She states the symptoms have been progressive over the last year.    Blood pressure today is low at 92/56 with repeat reading reduced to 90/60.  She admits to eating a good lunch and drinking at least 2 bottles of water daily.    Activity level is unchanged  Follows a  heart healthy diet  Remains compliant with all medications.                     Assessment and Plan:     1.  Lightheadedness -occasional, progressive over the last year.  History of carotid disease status post endarterectomy recent CT of the head and neck showing no stenosis, occlusion or aneurysm.  Continue to monitor.     2.  Hypotension -borderline soft today at 92/56, asymptomatic.  Home blood pressures are well controlled.  Continue low-dose Toprol 25 mg in the evening.  Monitor for any worsening symptoms or hypotension.  Encourage hydration.    3.  Paroxysmal atrial fibrillation -currently in normal sinus rhythm.  Continue Toprol and Eliquis for stroke prevention.           Thank you for allowing me to participate in this delightful patient's care. I have recommended she follow up in 6 month with [facility] or sooner if needed.      Cathie Jones, APRN CNP         Review of Systems:     Review of Systems:  Skin:        Eyes:       ENT:       Respiratory:  Negative    Cardiovascular:    lightheadedness;dizziness;fatigue;Positive for  Gastroenterology:      Genitourinary:       Musculoskeletal:       Neurologic:       Psychiatric:       Heme/Lymph/Imm:       Endocrine:                   Physical Exam:     GEN:  In general, this is a well nourished elderly female in no acute distress.  HEENT:  Pupils equal, round. Sclerae nonicteric. Clear oropharynx. Mucous membranes moist.  NECK: Supple, no masses appreciated. Trachea midline. No JVD   C/V:  Regular rate and rhythm, No murmur, rub or gallop. No S3 or RV heave.   RESP: Respirations are unlabored. No use of accessory muscles. Clear to auscultation bilaterally without wheezing, rales, or rhonchi.  GI: Abdomen soft, nontender, nondistended. No HSM appreciated.   EXTREM: No LE edema. No cyanosis or clubbing.  NEURO: Alert and oriented, cooperative. No obvious focal deficits.   PSYCH: Normal affect.  SKIN: Warm and dry. No rashes or petechiae appreciated.          Past  Medical History:     Past Medical History:   Diagnosis Date     Atrial fibrillation (H) 5/2012    with RVR.  Occurred POD #2 following carotid enarterectomy.     Depressive disorder      Elevated LFTs     occurred on lipitor; resolved off medications     Esophageal stenosis      Hyperlipidaemia      Hyperlipidemia      Irregular heart beat      New onset atrial fibrillation (H) 5/13/2012     Osteoarthrosis      PAD (peripheral artery disease) (H)      PVD (peripheral vascular disease) (H)     s/p carotid enarterectomy 5-10-12     SVT (supraventricular tachycardia) (H) 4/21/2020              Past Surgical History:     Past Surgical History:   Procedure Laterality Date     APPENDECTOMY      appy as a child     CLOSED REDUCTION WRIST Right 1/20/2016    Procedure: CLOSED REDUCTION WRIST;  Surgeon: Mina Brand MD;  Location: [facility] OR     COLONOSCOPY  8/6/2013    Procedure: COMBINED COLONOSCOPY, SINGLE BIOPSY/POLYPECTOMY BY BIOPSY;  COLONOSCOPY with polypectomy using cold forceps.;  Surgeon: Madeline Oviedo MD;  Location:  GI     COLONOSCOPY  8/29/2016    Dr. Oviedo [facility]     COLONOSCOPY N/A 8/29/2016    Procedure: COMBINED COLONOSCOPY, SINGLE OR MULTIPLE BIOPSY/POLYPECTOMY BY BIOPSY;  Surgeon: Madeline Oviedo MD;  Location: [facility] GI     ENDARTERECTOMY CAROTID  5/10/2012    LEFT, WITH EEG ; Surgeon:SELINA HANKS; Location: OR     ENT SURGERY      T&A as a child     HC CORRECT BUNION,SIMPLE      Right     HC REPAIR ROTATOR CUFF,ACUTE  2000    Right     HC REPAIR ROTATOR CUFF,ACUTE  2002    Left     HYSTERECTOMY, VAGINAL      simple      rt foot hammer toe repair  2005     ZZC H[place]IARD W/O FACETEC FORAMOT/[facility] 1/2 VRT SEG, LUMBAR      L4              Allergies:   Patient has no known allergies.       Data:   All laboratory data reviewed:    LAST CHOLESTEROL:  Lab Results   Component Value Date    CHOL 161 04/23/2021     Lab Results   Component Value Date    HDL 73 04/23/2021     Lab Results   Component Value Date     LDL 76 04/23/2021     Lab Results   Component Value Date    TRIG 60 04/23/2021     Lab Results   Component Value Date    CHOLHDLRATIO 2.6 02/09/2015       LAST BMP:  Lab Results   Component Value Date     02/25/2022     04/23/2021      Lab Results   Component Value Date    POTASSIUM 3.8 02/25/2022    POTASSIUM 4.3 04/23/2021     Lab Results   Component Value Date    CHLORIDE 104 02/25/2022    CHLORIDE 98 04/23/2021     Lab Results   Component Value Date    MABEL 8.8 02/25/2022    MABEL 9.1 04/23/2021     Lab Results   Component Value Date    CO2 26 02/25/2022    CO2 29 04/23/2021     Lab Results   Component Value Date    BUN 17 02/25/2022    BUN 11 04/23/2021     Lab Results   Component Value Date    CR 0.82 02/25/2022    CR 0.68 04/23/2021     Lab Results   Component Value Date    GLC 96 02/25/2022    GLC 95 04/23/2021       LAST CBC:  Lab Results   Component Value Date    WBC 6.0 02/25/2022    WBC 6.1 11/29/2020     Lab Results   Component Value Date    RBC 3.70 02/25/2022    RBC 3.75 11/29/2020     Lab Results   Component Value Date    HGB 12.1 02/25/2022    HGB 12.5 04/23/2021     Lab Results   Component Value Date    HCT 37.2 02/25/2022    HCT 37.6 11/29/2020     Lab Results   Component Value Date     02/25/2022     11/29/2020     Lab Results   Component Value Date    MCH 32.7 02/25/2022    MCH 32.8 11/29/2020     Lab Results   Component Value Date    MCHC 32.5 02/25/2022    MCHC 32.7 11/29/2020     Lab Results   Component Value Date    RDW 11.9 02/25/2022    RDW 11.9 11/29/2020     Lab Results   Component Value Date     02/25/2022     11/29/2020

## 2023-01-27 NOTE — LETTER
1/27/2023    Carisa Waggoner MD  [address] [facility] [address]  [facility] [address]    RE: Jennifer Whiteside       Dear Colleague,     I had the pleasure of seeing Jennifer Whiteside in the [facility].   Cardiology Clinic Progress Note  Jennifer Whiteside MRN# [id number]   YOB: 1935 Age: 87 year old     Reason For Visit:  Hypertension    Primary Cardiologist:   Dr. Truong          History of Presenting Illness:      Jennifer Whiteside is a pleasant 87 year old patient who carries a past medical history significant for paroxysmal atrial fibrillation on anticoagulation, SVT, hypertension, hyperlipidemia, bilateral carotid artery disease status post endarterectomy, PVD, and anxiety.      She was last seen on 11/22/2022 for routine follow up. She offered no cardiac complaint and medications remained unchanged. She had previously had episodes of orthostatic hypotension which resolved after the discontinuation of chlorthalidone.  Please see that office visit for more complete HPI.    Last cardiac workup consisted of a echocardiogram (4/7/2022 ) that showed a normal ejection fraction estimated at 60 to 65%, grade 1 diastolic dysfunction, mild tricuspid regurgitation, and elevated RV systolic pressure consistent with mild pulmonary hypertension. Nuclear stress test in 2021 was negative for ischemia.     Over the last few days she has noticed her blood pressures elevated accompanied by lightheadedness.  She takes her blood pressures multiple times a day with readings ranging from 1 ' systolic with an occasional 160.  She became nervous and requested a clinical evaluation.    Today she presents feeling well on a cardiac standpoint, denies chest pain, shortness of breath, palpitations, PND, orthopnea, presyncope, or leg edema.  She occasionally feels lightheadedness and is recently started using a walker for better support.  She states the symptoms have been  progressive over the last year.    Blood pressure today is low at 92/56 with repeat reading reduced to 90/60.  She admits to eating a good lunch and drinking at least 2 bottles of water daily.    Activity level is unchanged  Follows a heart healthy diet  Remains compliant with all medications.                     Assessment and Plan:     1.  Lightheadedness -occasional, progressive over the last year.  History of carotid disease status post endarterectomy recent CT of the head and neck showing no stenosis, occlusion or aneurysm.  Continue to monitor.     2.  Hypotension -borderline soft today at 92/56, asymptomatic.  Home blood pressures are well controlled.  Continue low-dose Toprol 25 mg in the evening.  Monitor for any worsening symptoms or hypotension.  Encourage hydration.    3.  Paroxysmal atrial fibrillation -currently in normal sinus rhythm.  Continue Toprol and Eliquis for stroke prevention.           Thank you for allowing me to participate in this delightful patient's care. I have recommended she follow up in 6 month with [facility] or sooner if needed.      Cathie Jones, PAVITHRA CNP         Review of Systems:     Review of Systems:  Skin:        Eyes:       ENT:       Respiratory:  Negative    Cardiovascular:    lightheadedness;dizziness;fatigue;Positive for  Gastroenterology:      Genitourinary:       Musculoskeletal:       Neurologic:       Psychiatric:       Heme/Lymph/Imm:       Endocrine:                   Physical Exam:     GEN:  In general, this is a well nourished elderly female in no acute distress.  HEENT:  Pupils equal, round. Sclerae nonicteric. Clear oropharynx. Mucous membranes moist.  NECK: Supple, no masses appreciated. Trachea midline. No JVD   C/V:  Regular rate and rhythm, No murmur, rub or gallop. No S3 or RV heave.   RESP: Respirations are unlabored. No use of accessory muscles. Clear to auscultation bilaterally without wheezing, rales, or rhonchi.  GI: Abdomen soft, nontender,  nondistended. No HSM appreciated.   EXTREM: No LE edema. No cyanosis or clubbing.  NEURO: Alert and oriented, cooperative. No obvious focal deficits.   PSYCH: Normal affect.  SKIN: Warm and dry. No rashes or petechiae appreciated.          Past Medical History:     Past Medical History:   Diagnosis Date     Atrial fibrillation (H) 5/2012    with RVR.  Occurred POD #2 following carotid enarterectomy.     Depressive disorder      Elevated LFTs     occurred on lipitor; resolved off medications     Esophageal stenosis      Hyperlipidaemia      Hyperlipidemia      Irregular heart beat      New onset atrial fibrillation (H) 5/13/2012     Osteoarthrosis      PAD (peripheral artery disease) (H)      PVD (peripheral vascular disease) (H)     s/p carotid enarterectomy 5-10-12     SVT (supraventricular tachycardia) (H) 4/21/2020              Past Surgical History:     Past Surgical History:   Procedure Laterality Date     APPENDECTOMY      appy as a child     CLOSED REDUCTION WRIST Right 1/20/2016    Procedure: CLOSED REDUCTION WRIST;  Surgeon: Mina Brand MD;  Location:  OR     COLONOSCOPY  8/6/2013    Procedure: COMBINED COLONOSCOPY, SINGLE BIOPSY/POLYPECTOMY BY BIOPSY;  COLONOSCOPY with polypectomy using cold forceps.;  Surgeon: Madeline Oviedo MD;  Location:  GI     COLONOSCOPY  8/29/2016    Dr. Oviedo [facility]     COLONOSCOPY N/A 8/29/2016    Procedure: COMBINED COLONOSCOPY, SINGLE OR MULTIPLE BIOPSY/POLYPECTOMY BY BIOPSY;  Surgeon: Madeline Oviedo MD;  Location:  GI     ENDARTERECTOMY CAROTID  5/10/2012    LEFT, WITH EEG ; Surgeon:SELINA HANKS; Location: OR     ENT SURGERY      T&A as a child     HC CORRECT BUNION,SIMPLE      Right     HC REPAIR ROTATOR CUFF,ACUTE  2000    Right     HC REPAIR ROTATOR CUFF,ACUTE  2002    Left     HYSTERECTOMY, VAGINAL      simple      rt foot hammer toe repair  2005     ZZC H[place]IARD W/O FACETEC FORAMOT/[facility] 1/2 VRT SEG, LUMBAR      L4              Allergies:   Patient has  no known allergies.       Data:   All laboratory data reviewed:    LAST CHOLESTEROL:  Lab Results   Component Value Date    CHOL 161 04/23/2021     Lab Results   Component Value Date    HDL 73 04/23/2021     Lab Results   Component Value Date    LDL 76 04/23/2021     Lab Results   Component Value Date    TRIG 60 04/23/2021     Lab Results   Component Value Date    CHOLHDLRATIO 2.6 02/09/2015       LAST BMP:  Lab Results   Component Value Date     02/25/2022     04/23/2021      Lab Results   Component Value Date    POTASSIUM 3.8 02/25/2022    POTASSIUM 4.3 04/23/2021     Lab Results   Component Value Date    CHLORIDE 104 02/25/2022    CHLORIDE 98 04/23/2021     Lab Results   Component Value Date    MABEL 8.8 02/25/2022    MABEL 9.1 04/23/2021     Lab Results   Component Value Date    CO2 26 02/25/2022    CO2 29 04/23/2021     Lab Results   Component Value Date    BUN 17 02/25/2022    BUN 11 04/23/2021     Lab Results   Component Value Date    CR 0.82 02/25/2022    CR 0.68 04/23/2021     Lab Results   Component Value Date    GLC 96 02/25/2022    GLC 95 04/23/2021       LAST CBC:  Lab Results   Component Value Date    WBC 6.0 02/25/2022    WBC 6.1 11/29/2020     Lab Results   Component Value Date    RBC 3.70 02/25/2022    RBC 3.75 11/29/2020     Lab Results   Component Value Date    HGB 12.1 02/25/2022    HGB 12.5 04/23/2021     Lab Results   Component Value Date    HCT 37.2 02/25/2022    HCT 37.6 11/29/2020     Lab Results   Component Value Date     02/25/2022     11/29/2020     Lab Results   Component Value Date    MCH 32.7 02/25/2022    MCH 32.8 11/29/2020     Lab Results   Component Value Date    MCHC 32.5 02/25/2022    MCHC 32.7 11/29/2020     Lab Results   Component Value Date    RDW 11.9 02/25/2022    RDW 11.9 11/29/2020     Lab Results   Component Value Date     02/25/2022     11/29/2020               Thank you for allowing me to participate in the care of your  patient.      Sincerely,     PAVITHRA Charles CNP     [facility]  cc:   PAVITHRA Charles CNP  [address] [place] [address]

## 2023-02-27 DIAGNOSIS — I48.0 PAROXYSMAL ATRIAL FIBRILLATION (H): ICD-10-CM

## 2023-04-20 DIAGNOSIS — E78.5 HYPERLIPIDEMIA LDL GOAL <70: ICD-10-CM

## 2023-04-21 RX ORDER — PRAVASTATIN SODIUM 40 MG
40 TABLET ORAL DAILY
Qty: 90 TABLET | Refills: 1 | Status: SHIPPED | OUTPATIENT
Start: 2023-04-21 | End: 2023-10-23

## 2023-05-22 DIAGNOSIS — M85.89 OSTEOPENIA OF MULTIPLE SITES: ICD-10-CM

## 2023-05-22 NOTE — TELEPHONE ENCOUNTER
Pending Prescriptions:                       Disp   Refills    alendronate (FOSAMAX) 70 MG tablet [Pharma*12 tab*0        Sig: Take one tablet by mouth once a week with 8 ounces           water.    Routing refill request to provider for review/approval because:  Dexa not current.    Please advise, thanks.

## 2023-05-23 RX ORDER — ALENDRONATE SODIUM 70 MG/1
TABLET ORAL
Qty: 12 TABLET | Refills: 0 | Status: SHIPPED | OUTPATIENT
Start: 2023-05-23 | End: 2023-10-02

## 2023-06-07 ENCOUNTER — TELEPHONE (OUTPATIENT)
Dept: CARDIOLOGY | Facility: CLINIC | Age: 88
End: 2023-06-07
Payer: COMMERCIAL

## 2023-06-07 NOTE — CONFIDENTIAL NOTE
Routing to PAVITHRA Charles    Patient called in today because she reports her blood pressure taken at the assisted living facility was 180/100 at 11am. Her BP this morning was reported to be 118/56 with heart rates consistently in 80s. Upon assessment of symptoms, she endorses lightheadedness.     Last OV on 1/27/23    1.  Lightheadedness -occasional, progressive over the last year.  History of carotid disease status post endarterectomy recent CT of the head and neck showing no stenosis, occlusion or aneurysm.  Continue to monitor.      2.  Hypotension -borderline soft today at 92/56, asymptomatic.  Home blood pressures are well controlled.  Continue low-dose Toprol 25 mg in the evening.  Monitor for any worsening symptoms or hypotension.  Encourage hydration.     3.  Paroxysmal atrial fibrillation -currently in normal sinus rhythm.  Continue Toprol and Eliquis for stroke prevention.    7 day Zio patch performed in Jan 2020 showed NSR with 14 SVT runs and 2% afib burden.     Echocardiogram (4/7/2022 ) showed EF 60 to 65%, grade 1 diastolic dysfunction, mild TVR, and elevated RV systolic pressure consistent with mild pulmonary hypertension. Nuclear stress test in 2021 was negative for ischemia.      Over the last few days she has noticed her blood pressures elevated accompanied by lightheadedness.  She takes her blood pressures multiple times a day with readings ranging from 1 ' systolic with an occasional 160.  She became nervous and requested a clinical evaluation.    RN encouraged that next time she had a high BP reading to sit in a chair with both feet flat on the floor in a relaxed state for about 5 minutes and take the readings on both sides. RN also stated that anxiety could be related to high BP readings but would communicate with you about recommendations. Also encouraged hydration at this time.     Cardiac medications verified  Metoprolol XL 25 mg daily  Pravastatin 40 mg daily  Apixaban 2.5 mg  BID    Next OV in on 7/28/23. Any other recommendations at this time?    Coral Ahn RN

## 2023-06-07 NOTE — TELEPHONE ENCOUNTER
Recommend monitoring blood pressures daily. Looks like she has had only 1 elevated outlier.   She carries a history of lightheadedness and occasional soft pressures.   Recommend no medication changes at this time.

## 2023-06-07 NOTE — TELEPHONE ENCOUNTER
Patient called back and we went over recommendations listed down below. Encouraged patient to touch base with PCP about any other scans/ tests that they could think of. Encouraged hydration and BP monitoring parameters. Patient verbalized understanding. Will MyChart message with any other questions or concerns.     Coral Ahn RN

## 2023-06-15 ENCOUNTER — OFFICE VISIT (OUTPATIENT)
Dept: INTERNAL MEDICINE | Facility: CLINIC | Age: 88
End: 2023-06-15
Payer: COMMERCIAL

## 2023-06-15 VITALS
WEIGHT: 127 LBS | HEART RATE: 88 BPM | HEIGHT: 64 IN | DIASTOLIC BLOOD PRESSURE: 60 MMHG | RESPIRATION RATE: 16 BRPM | BODY MASS INDEX: 21.68 KG/M2 | SYSTOLIC BLOOD PRESSURE: 102 MMHG | TEMPERATURE: 97.4 F | OXYGEN SATURATION: 98 %

## 2023-06-15 DIAGNOSIS — R42 DIZZINESS: Primary | ICD-10-CM

## 2023-06-15 PROCEDURE — 99215 OFFICE O/P EST HI 40 MIN: CPT | Performed by: INTERNAL MEDICINE

## 2023-06-15 NOTE — PATIENT INSTRUCTIONS
Step 1:   Stop pravastatin. If no improvement in 5 days, restart and go on to step 2.   If your symptoms improve within 5 days, stay off the pravastatin. If the symptoms completely go away, send me a message and we could consider an alternate.     If the symptoms are better but not gone, then stay off pravastatin and go to step 2.     Step 2:   Move the metoprolol to supper time for 3-5 days. If you can tell that the unsteadiness now starting in the evening and wears off earlier in the day, it is probably causing symptoms so send a message.   If you are not sure, move it to about lunchtime. If you can tell a difference in when you feel unsteadiness, send a message.   After 3-5 days, if you do not think it changes when you feel the unsteadiness, then go back to suppertime a few days then back to bedtime and go to step 3.     Step 3:   Move the sertraline to supper time for 3-5 days just like step 2. If not changing, move to lunchtime like step 2. If no change at all, go back to suppertime then bedtime.   Send a message and I will refer you to neurologist.

## 2023-06-15 NOTE — PROGRESS NOTES
Assessment & Plan     Dizziness  Her symptoms do not seem to be orthostatic lightheadedness as if you have not pressure or low blood pressure and it is not just related to standing up.  Advised it is relatively unlikely to be medication but that is possible.  Suggest we try to hold the pravastatin, with the timing of the medications.  This would tend to alter the time of her symptoms if it is medication related so she may feel better earlier in the day but is due to one of her bedtime meds.  Improving without for neurology.    Please see patient instructions for details on medication adjustments.        42 minutes spent by me on the date of the encounter doing chart review, history and exam, documentation and further activities per the note           Carisa Waggoner MD  [facility] GIOVANI Aden is a 88 year old, presenting for the following health issues:       View : No data to display.              History of Present Illness       Reason for visit:  Irregular blood pressure readings  Symptom onset:  3-7 days ago  Symptoms include:  Readings show high one time and low the next  Symptom intensity:  Mild  Symptom progression:  Staying the same  Had these symptoms before:  No  What makes it worse:  No  What makes it better:  No    She eats 2-3 servings of fruits and vegetables daily.She consumes 1 sweetened beverage(s) daily.She exercises with enough effort to increase her heart rate 9 or less minutes per day.  She exercises with enough effort to increase her heart rate 3 or less days per week.   She is taking medications regularly.       She presents to follow-up on dizziness: She has had dizziness for over a year, gradually worsening.  Her dizziness is not vertigo.  She has never had syncope.  She does not feel like he is going to pass out, as she never feels like her vision is gray.  It is more of a sensation of being off balance like she is going to fall but she does use the word  lightheadedness a lot.  She will feel this when she is up in the morning, sometimes will lie back down for about 20 minutes and then it seems a little better.  The last through the morning and finally improves around 2 to 3 PM.  She feels this when she starts walking rather than when she sits up or stands up.  She does not feel it when lying in bed.  She does feel like it affects her gait a little, she needs a little but is not bumping into walls.    She had a lot of variability in her blood pressure and had spoken with cardiology about it.  She does not think that it is necessarily related to certain blood pressure levels.  She is not sure if it may be due to medication.  She wonders if it could be due to pravastatin which she takes at bedtime.  She also takes metoprolol and sertraline at bedtime.        Patient Active Problem List   Diagnosis     Hyperlipidemia LDL goal <70     Advance Care Planning     Paroxysmal atrial fibrillation (H)     Bilateral carotid artery disease, unspecified type (H)     Osteopenia of multiple sites     SVT (supraventricular tachycardia) (H)     Benign essential hypertension     Left inguinal hernia     Urinary frequency     Current Outpatient Medications   Medication Sig Dispense Refill     alendronate (FOSAMAX) 70 MG tablet Take one tablet by mouth once a week with 8 ounces water. 12 tablet 0     apixaban ANTICOAGULANT (ELIQUIS) 2.5 MG tablet Take 1 tablet (2.5 mg) by mouth 2 times daily 180 tablet 4     GARLIC PO Take 1 tablet by mouth daily        latanoprost (XALATAN) 0.005 % ophthalmic solution Place 1 drop into both eyes At Bedtime       metoprolol succinate ER (TOPROL XL) 25 MG 24 hr tablet Take 1 tablet (25 mg) by mouth every evening 90 tablet 3     Multiple Vitamin (MULTI-VITAMIN) per tablet Take  by mouth. Equaline MVI - 1 daily 100 tablet 3     omega-3 fatty acids 1200 MG capsule Take 1 capsule by mouth 2 times daily.       polyethylene glycol-propylene glycol (SYSTANE  "ULTRA) 0.4-0.3 % SOLN ophthalmic solution Place 1 drop into both eyes 2 times daily       pravastatin (PRAVACHOL) 40 MG tablet Take 1 tablet (40 mg) by mouth daily 90 tablet 1     sertraline (ZOLOFT) 50 MG tablet Take 1 tablet (50 mg) by mouth daily 90 tablet 3     Vitamin D, Cholecalciferol, 25 MCG (1000 UT) TABS Take 25 mcg by mouth daily                 Review of Systems   No fever, chills, headache, palpitations, syncope, shortness of breath, visual disturbance including no problems with black spots, double vision radiation, focal numbness, weakness, no incoordination      Objective    /60   Pulse 88   Temp 97.4  F (36.3  C) (Oral)   Resp 16   Ht 1.613 m (5' 3.5\")   Wt 57.6 kg (127 lb)   LMP  (LMP Unknown)   SpO2 98%   BMI 22.14 kg/m    Body mass index is 22.14 kg/m .  Physical Exam     PERRL, unable to see Fundi  CN 2-12 intact  Finger nose finger with mild tremor  No rigidity  Mild slow gait                    "

## 2023-06-26 ENCOUNTER — TELEPHONE (OUTPATIENT)
Dept: INTERNAL MEDICINE | Facility: CLINIC | Age: 88
End: 2023-06-26
Payer: COMMERCIAL

## 2023-06-26 DIAGNOSIS — R42 DIZZINESS: Primary | ICD-10-CM

## 2023-06-26 NOTE — TELEPHONE ENCOUNTER
Patient calls to report that the recommendation of changing the times when she takes her medication to alleviate the dizzy and lite headedness, did not work. The dizziness and lite headedness are more so in the mornining and then less by the afternoon.  She  reported she is using her walker more.     Patient mentioned that at her last office visit ,  mentioned seeing Neuro. Patient is agreeable to that ,so please enter referral.     Patient number [phone]

## 2023-06-30 NOTE — TELEPHONE ENCOUNTER
Please advise that we could send her to the [facility] clinic in [place] which is just [place] south of here or to [facility] if she decides she wants to be seen by neurology in the future.

## 2023-06-30 NOTE — TELEPHONE ENCOUNTER
Patient called back and said they cannot get her in until December so she is choosing not to schedule.  She said she seems to be improving also so she will let us know if she needs anything further.

## 2023-07-06 NOTE — TELEPHONE ENCOUNTER
Call to pt. She is driving to an appointment, so cannot get the information. She will call back.    Please give her [facility] clinic info in [place].     #[phone] Phone.     [address], [place] [address]

## 2023-07-06 NOTE — TELEPHONE ENCOUNTER
Patient calling. She does not understand message from provider and she is asking for phone numbers to different clinics she can try to get into sooner than Dec  Ok to call and  [phone]

## 2023-07-28 ENCOUNTER — OFFICE VISIT (OUTPATIENT)
Dept: CARDIOLOGY | Facility: CLINIC | Age: 88
End: 2023-07-28
Attending: NURSE PRACTITIONER
Payer: COMMERCIAL

## 2023-07-28 VITALS
SYSTOLIC BLOOD PRESSURE: 138 MMHG | DIASTOLIC BLOOD PRESSURE: 78 MMHG | HEIGHT: 64 IN | WEIGHT: 127 LBS | BODY MASS INDEX: 21.68 KG/M2 | HEART RATE: 71 BPM

## 2023-07-28 DIAGNOSIS — I48.0 PAROXYSMAL ATRIAL FIBRILLATION (H): Primary | ICD-10-CM

## 2023-07-28 DIAGNOSIS — R42 LIGHTHEADEDNESS: ICD-10-CM

## 2023-07-28 DIAGNOSIS — E78.5 HYPERLIPIDEMIA LDL GOAL <70: ICD-10-CM

## 2023-07-28 PROCEDURE — 99214 OFFICE O/P EST MOD 30 MIN: CPT | Performed by: NURSE PRACTITIONER

## 2023-07-28 NOTE — LETTER
7/28/2023    Carisa Waggoner MD  [address]  [facility] [address]    RE: Jennifer Whiteside       Dear Colleague,     I had the pleasure of seeing Jennifer Whiteside in the [facility].   Cardiology Clinic Progress Note  Jennifer Whiteside MRN# [id number]   YOB: 1935 Age: 87 year old     Reason For Visit: 6-month follow-up   Primary Cardiologist:   Dr. Truong          History of Presenting Illness:      Jennifer Whiteside is a pleasant 88 year old patient who carries a past medical history significant for paroxysmal atrial fibrillation on anticoagulation, SVT, hypertension, hyperlipidemia, bilateral carotid artery disease status post endarterectomy, PVD, and anxiety.      She was last seen on 1/27/2023 for evaluation of lightheadedness and elevated blood pressures.  However, at office visit her blood pressures were on the low side in the low 90s.  Medications remained unchanged.  Last cardiac workup consisted of a echocardiogram (4/7/2022 ) that showed a normal ejection fraction estimated at 60 to 65%, grade 1 diastolic dysfunction, mild tricuspid regurgitation, and elevated RV systolic pressure consistent with mild pulmonary hypertension. Nuclear stress test in 2021 was negative for ischemia.  Head CT showed mild to moderate atherosclerotic disease of the right carotid bifurcation and proximal internal carotid artery without significant narrowing.  No significant stenosis to the left.    She recently followed up with her primary care physician with similar complaints.  Symptoms were felt to not be orthostatic lightheadedness.  However, due to the timing of her symptoms, pravastatin was briefly held.  Symptoms remained unchanged despite holding of statin.  This has since been restarted.  She has been referred to neurology with evaluation scheduled for next month.    She returns to the office today for 6-month follow-up.  She does not endorse any  cardiac complaint, denies chest pain, shortness of breath, palpitations, PND, orthopnea, presyncope or leg edema.  Her primary complaint is lightheadedness and unsteady gait.  She uses a walker intermittently for support.    Blood pressure is well controlled at 138/78  Atrial fibrillation is rate controlled on low-dose Toprol and Eliquis for stroke prevention.  Last BMP and CBC were unremarkable  Lipid panel showed a total cholesterol of 155, HDL 73, LDL 67, and triglycerides 75    She continues to be very active and quite social at her living facility.  Follows a heart healthy diet  Remains compliant with all medications.                     Assessment and Plan:     1.  Lightheadedness -persistent with varying levels of intensity.  Symptoms are likely not cardiac in nature.  Unremarkable echocardiogram, negative stress test, and stable carotid disease.  She is scheduled for evaluation with neurology next month.  2.  Paroxysmal atrial fibrillation -currently in normal sinus rhythm.  Continue Toprol and Eliquis for stroke prevention.           Thank you for allowing me to participate in this delightful patient's care. I have recommended she follow up in 1 year with  or sooner if needed.    PAVITHRA Charles CNP         Review of Systems:     Review of Systems:  Skin:        Eyes:       ENT:       Respiratory:  Positive for shortness of breath;dyspnea on exertion  Cardiovascular:    Positive for;fatigue;lightheadedness  Gastroenterology:      Genitourinary:       Musculoskeletal:       Neurologic:       Psychiatric:       Heme/Lymph/Imm:       Endocrine:                   Physical Exam:     GEN:  In general, this is a well nourished elderly female in no acute distress.  HEENT:  Pupils equal, round. Sclerae nonicteric. Clear oropharynx. Mucous membranes moist.  NECK: No JVD   C/V:  Regular rate and rhythm, No murmur, rub or gallop. No S3 or RV heave.   RESP: Respirations are unlabored. No use of accessory  muscles. Clear to auscultation bilaterally without wheezing, rales, or rhonchi.  GI: Not assessed  EXTREM: No LE edema. No cyanosis or clubbing.  NEURO: Alert and oriented, cooperative. No obvious focal deficits.   PSYCH: Normal affect.  SKIN: Warm and dry. No rashes or petechiae appreciated.          Past Medical History:     Past Medical History:   Diagnosis Date    Atrial fibrillation (H) 5/2012    with RVR.  Occurred POD #2 following carotid enarterectomy.    Depressive disorder     Elevated LFTs     occurred on lipitor; resolved off medications    Esophageal stenosis     Hyperlipidaemia     Hyperlipidemia     Irregular heart beat     New onset atrial fibrillation (H) 5/13/2012    Osteoarthrosis     PAD (peripheral artery disease) (H)     PVD (peripheral vascular disease) (H)     s/p carotid enarterectomy 5-10-12    SVT (supraventricular tachycardia) (H) 4/21/2020              Past Surgical History:     Past Surgical History:   Procedure Laterality Date    APPENDECTOMY      appy as a child    CLOSED REDUCTION WRIST Right 1/20/2016    Procedure: CLOSED REDUCTION WRIST;  Surgeon: Mina rBand MD;  Location:  OR    COLONOSCOPY  8/6/2013    Procedure: COMBINED COLONOSCOPY, SINGLE BIOPSY/POLYPECTOMY BY BIOPSY;  COLONOSCOPY with polypectomy using cold forceps.;  Surgeon: Madeline Oviedo MD;  Location:  GI    COLONOSCOPY  8/29/2016    Dr. Oviedo [facility]    COLONOSCOPY N/A 8/29/2016    Procedure: COMBINED COLONOSCOPY, SINGLE OR MULTIPLE BIOPSY/POLYPECTOMY BY BIOPSY;  Surgeon: Madeline Oviedo MD;  Location:  GI    ENDARTERECTOMY CAROTID  5/10/2012    LEFT, WITH EEG ; Surgeon:SELINA HANKS; Location: OR    ENT SURGERY      T&A as a child    HC CORRECT BUNION,SIMPLE      Right    HC REPAIR ROTATOR CUFF,ACUTE  2000    Right    HC REPAIR ROTATOR CUFF,ACUTE  2002    Left    HYSTERECTOMY, VAGINAL      simple     rt foot hammer toe repair  2005    ZZC HILLIARD W/O FACETEC FORAMOT/DSKC 1/2 VRT SEG, LUMBAR      L4               Allergies:   Patient has no known allergies.       Data:   All laboratory data reviewed:    LAST CHOLESTEROL:  Lab Results   Component Value Date    CHOL 161 04/23/2021     Lab Results   Component Value Date    HDL 73 04/23/2021     Lab Results   Component Value Date    LDL 76 04/23/2021     Lab Results   Component Value Date    TRIG 60 04/23/2021     Lab Results   Component Value Date    CHOLHDLRATIO 2.6 02/09/2015       LAST BMP:  Lab Results   Component Value Date     02/25/2022     04/23/2021      Lab Results   Component Value Date    POTASSIUM 3.8 02/25/2022    POTASSIUM 4.3 04/23/2021     Lab Results   Component Value Date    CHLORIDE 104 02/25/2022    CHLORIDE 98 04/23/2021     Lab Results   Component Value Date    MABEL 8.8 02/25/2022    MABEL 9.1 04/23/2021     Lab Results   Component Value Date    CO2 26 02/25/2022    CO2 29 04/23/2021     Lab Results   Component Value Date    BUN 17 02/25/2022    BUN 11 04/23/2021     Lab Results   Component Value Date    CR 0.82 02/25/2022    CR 0.68 04/23/2021     Lab Results   Component Value Date    GLC 96 02/25/2022    GLC 95 04/23/2021       LAST CBC:  Lab Results   Component Value Date    WBC 6.0 02/25/2022    WBC 6.1 11/29/2020     Lab Results   Component Value Date    RBC 3.70 02/25/2022    RBC 3.75 11/29/2020     Lab Results   Component Value Date    HGB 12.1 02/25/2022    HGB 12.5 04/23/2021     Lab Results   Component Value Date    HCT 37.2 02/25/2022    HCT 37.6 11/29/2020     Lab Results   Component Value Date     02/25/2022     11/29/2020     Lab Results   Component Value Date    MCH 32.7 02/25/2022    MCH 32.8 11/29/2020     Lab Results   Component Value Date    MCHC 32.5 02/25/2022    MCHC 32.7 11/29/2020     Lab Results   Component Value Date    RDW 11.9 02/25/2022    RDW 11.9 11/29/2020     Lab Results   Component Value Date     02/25/2022     11/29/2020           Thank you for allowing me to participate  in the care of your patient.      Sincerely,     PAVITHRA Charles CNP     [facility]  cc:   PAVITHRA Charles CNP  [address] [place] [address]

## 2023-07-28 NOTE — PROGRESS NOTES
Cardiology Clinic Progress Note  Jennifer Whiteside MRN# [id number]   YOB: 1935 Age: 87 year old     Reason For Visit: 6-month follow-up   Primary Cardiologist:   Dr. Truong          History of Presenting Illness:      Jennifer Whiteside is a pleasant 88 year old patient who carries a past medical history significant for paroxysmal atrial fibrillation on anticoagulation, SVT, hypertension, hyperlipidemia, bilateral carotid artery disease status post endarterectomy, PVD, and anxiety.      She was last seen on 1/27/2023 for evaluation of lightheadedness and elevated blood pressures.  However, at office visit her blood pressures were on the low side in the low 90s.  Medications remained unchanged.  Last cardiac workup consisted of a echocardiogram (4/7/2022 ) that showed a normal ejection fraction estimated at 60 to 65%, grade 1 diastolic dysfunction, mild tricuspid regurgitation, and elevated RV systolic pressure consistent with mild pulmonary hypertension. Nuclear stress test in 2021 was negative for ischemia.  Head CT showed mild to moderate atherosclerotic disease of the right carotid bifurcation and proximal internal carotid artery without significant narrowing.  No significant stenosis to the left.    She recently followed up with her primary care physician with similar complaints.  Symptoms were felt to not be orthostatic lightheadedness.  However, due to the timing of her symptoms, pravastatin was briefly held.  Symptoms remained unchanged despite holding of statin.  This has since been restarted.  She has been referred to neurology with evaluation scheduled for next month.    She returns to the office today for 6-month follow-up.  She does not endorse any cardiac complaint, denies chest pain, shortness of breath, palpitations, PND, orthopnea, presyncope or leg edema.  Her primary complaint is lightheadedness and unsteady gait.  She uses a walker intermittently for support.    Blood  pressure is well controlled at 138/78  Atrial fibrillation is rate controlled on low-dose Toprol and Eliquis for stroke prevention.  Last BMP and CBC were unremarkable  Lipid panel showed a total cholesterol of 155, HDL 73, LDL 67, and triglycerides 75    She continues to be very active and quite social at her living facility.  Follows a heart healthy diet  Remains compliant with all medications.                     Assessment and Plan:     1.  Lightheadedness -persistent with varying levels of intensity.  Symptoms are likely not cardiac in nature.  Unremarkable echocardiogram, negative stress test, and stable carotid disease.  She is scheduled for evaluation with neurology next month.  2.  Paroxysmal atrial fibrillation -currently in normal sinus rhythm.  Continue Toprol and Eliquis for stroke prevention.           Thank you for allowing me to participate in this delightful patient's care. I have recommended she follow up in 1 year with  or sooner if needed.    PAVITHRA Charles CNP         Review of Systems:     Review of Systems:  Skin:        Eyes:       ENT:       Respiratory:  Positive for shortness of breath;dyspnea on exertion  Cardiovascular:    Positive for;fatigue;lightheadedness  Gastroenterology:      Genitourinary:       Musculoskeletal:       Neurologic:       Psychiatric:       Heme/Lymph/Imm:       Endocrine:                   Physical Exam:     GEN:  In general, this is a well nourished elderly female in no acute distress.  HEENT:  Pupils equal, round. Sclerae nonicteric. Clear oropharynx. Mucous membranes moist.  NECK: No JVD   C/V:  Regular rate and rhythm, No murmur, rub or gallop. No S3 or RV heave.   RESP: Respirations are unlabored. No use of accessory muscles. Clear to auscultation bilaterally without wheezing, rales, or rhonchi.  GI: Not assessed  EXTREM: No LE edema. No cyanosis or clubbing.  NEURO: Alert and oriented, cooperative. No obvious focal deficits.   PSYCH: Normal  affect.  SKIN: Warm and dry. No rashes or petechiae appreciated.          Past Medical History:     Past Medical History:   Diagnosis Date    Atrial fibrillation (H) 5/2012    with RVR.  Occurred POD #2 following carotid enarterectomy.    Depressive disorder     Elevated LFTs     occurred on lipitor; resolved off medications    Esophageal stenosis     Hyperlipidaemia     Hyperlipidemia     Irregular heart beat     New onset atrial fibrillation (H) 5/13/2012    Osteoarthrosis     PAD (peripheral artery disease) (H)     PVD (peripheral vascular disease) (H)     s/p carotid enarterectomy 5-10-12    SVT (supraventricular tachycardia) (H) 4/21/2020              Past Surgical History:     Past Surgical History:   Procedure Laterality Date    APPENDECTOMY      appy as a child    CLOSED REDUCTION WRIST Right 1/20/2016    Procedure: CLOSED REDUCTION WRIST;  Surgeon: Mina Brand MD;  Location: [facility] OR    COLONOSCOPY  8/6/2013    Procedure: COMBINED COLONOSCOPY, SINGLE BIOPSY/POLYPECTOMY BY BIOPSY;  COLONOSCOPY with polypectomy using cold forceps.;  Surgeon: Madeline Oviedo MD;  Location:  GI    COLONOSCOPY  8/29/2016    Dr. Oviedo [facility]    COLONOSCOPY N/A 8/29/2016    Procedure: COMBINED COLONOSCOPY, SINGLE OR MULTIPLE BIOPSY/POLYPECTOMY BY BIOPSY;  Surgeon: Madeline Oviedo MD;  Location: [facility] GI    ENDARTERECTOMY CAROTID  5/10/2012    LEFT, WITH EEG ; Surgeon:SELINA HANKS; Location: OR    ENT SURGERY      T&A as a child    HC CORRECT BUNION,SIMPLE      Right    HC REPAIR ROTATOR CUFF,ACUTE  2000    Right    HC REPAIR ROTATOR CUFF,ACUTE  2002    Left    HYSTERECTOMY, VAGINAL      simple     rt foot hammer toe repair  2005    ZZC HILLIARD W/O FACETEC FORAMOT/DSKC 1/2 VRT SEG, LUMBAR      L4              Allergies:   Patient has no known allergies.       Data:   All laboratory data reviewed:    LAST CHOLESTEROL:  Lab Results   Component Value Date    CHOL 161 04/23/2021     Lab Results   Component Value Date    HDL  73 04/23/2021     Lab Results   Component Value Date    LDL 76 04/23/2021     Lab Results   Component Value Date    TRIG 60 04/23/2021     Lab Results   Component Value Date    CHOLHDLRATIO 2.6 02/09/2015       LAST BMP:  Lab Results   Component Value Date     02/25/2022     04/23/2021      Lab Results   Component Value Date    POTASSIUM 3.8 02/25/2022    POTASSIUM 4.3 04/23/2021     Lab Results   Component Value Date    CHLORIDE 104 02/25/2022    CHLORIDE 98 04/23/2021     Lab Results   Component Value Date    AMBEL 8.8 02/25/2022    MABEL 9.1 04/23/2021     Lab Results   Component Value Date    CO2 26 02/25/2022    CO2 29 04/23/2021     Lab Results   Component Value Date    BUN 17 02/25/2022    BUN 11 04/23/2021     Lab Results   Component Value Date    CR 0.82 02/25/2022    CR 0.68 04/23/2021     Lab Results   Component Value Date    GLC 96 02/25/2022    GLC 95 04/23/2021       LAST CBC:  Lab Results   Component Value Date    WBC 6.0 02/25/2022    WBC 6.1 11/29/2020     Lab Results   Component Value Date    RBC 3.70 02/25/2022    RBC 3.75 11/29/2020     Lab Results   Component Value Date    HGB 12.1 02/25/2022    HGB 12.5 04/23/2021     Lab Results   Component Value Date    HCT 37.2 02/25/2022    HCT 37.6 11/29/2020     Lab Results   Component Value Date     02/25/2022     11/29/2020     Lab Results   Component Value Date    MCH 32.7 02/25/2022    MCH 32.8 11/29/2020     Lab Results   Component Value Date    MCHC 32.5 02/25/2022    MCHC 32.7 11/29/2020     Lab Results   Component Value Date    RDW 11.9 02/25/2022    RDW 11.9 11/29/2020     Lab Results   Component Value Date     02/25/2022     11/29/2020

## 2023-07-28 NOTE — PATIENT INSTRUCTIONS
Thanks for participating in a office visit with the [facility] today.    Doing well on a cardiac standpoint   Continues with daily lightheadedness, has follow up with neurology in August.   Continue with current medical therapy      Follow up in 1 year with Dr. Truong     Please call my nurse at  [phone] with any questions or concerns.    Scheduling phone number: [phone]  Reminder: Please bring in all current medications, over the counter supplements and vitamin bottles to your next appointment.

## 2023-08-21 DIAGNOSIS — M85.89 OSTEOPENIA OF MULTIPLE SITES: ICD-10-CM

## 2023-08-22 RX ORDER — ALENDRONATE SODIUM 70 MG/1
TABLET ORAL
Qty: 12 TABLET | Refills: 1 | OUTPATIENT
Start: 2023-08-22

## 2023-08-22 NOTE — TELEPHONE ENCOUNTER
Patient returned call . Unable to connect her with a RN at this time.     Pls call her back    Patient call back number [phone]

## 2023-08-22 NOTE — TELEPHONE ENCOUNTER
Patient returned call . Unable to connect patient and RN but patient was informed of Md message. Patient does not need a call back number back at this time from RN.

## 2023-08-22 NOTE — TELEPHONE ENCOUNTER
She has been on this medication for 5 years so she can discontinue it.  We will repeat the bone density in 2 years.

## 2023-08-23 ENCOUNTER — TRANSFERRED RECORDS (OUTPATIENT)
Dept: HEALTH INFORMATION MANAGEMENT | Facility: CLINIC | Age: 88
End: 2023-08-23
Payer: COMMERCIAL

## 2023-09-05 ENCOUNTER — PATIENT OUTREACH (OUTPATIENT)
Dept: CARE COORDINATION | Facility: CLINIC | Age: 88
End: 2023-09-05
Payer: COMMERCIAL

## 2023-09-26 ENCOUNTER — NURSE TRIAGE (OUTPATIENT)
Dept: NURSING | Facility: CLINIC | Age: 88
End: 2023-09-26
Payer: COMMERCIAL

## 2023-09-26 ENCOUNTER — HOSPITAL ENCOUNTER (EMERGENCY)
Facility: CLINIC | Age: 88
Discharge: HOME | End: 2023-09-26
Attending: EMERGENCY MEDICINE | Admitting: EMERGENCY MEDICINE
Payer: COMMERCIAL

## 2023-09-26 ENCOUNTER — TRANSFERRED RECORDS (OUTPATIENT)
Dept: HEALTH INFORMATION MANAGEMENT | Facility: CLINIC | Age: 88
End: 2023-09-26

## 2023-09-26 VITALS
DIASTOLIC BLOOD PRESSURE: 99 MMHG | OXYGEN SATURATION: 95 % | TEMPERATURE: 97.9 F | RESPIRATION RATE: 15 BRPM | SYSTOLIC BLOOD PRESSURE: 166 MMHG | HEART RATE: 90 BPM

## 2023-09-26 DIAGNOSIS — I48.0 PAF (PAROXYSMAL ATRIAL FIBRILLATION) (H): ICD-10-CM

## 2023-09-26 DIAGNOSIS — Z79.01 ANTICOAGULATED: ICD-10-CM

## 2023-09-26 LAB
ANION GAP SERPL CALCULATED.3IONS-SCNC: 10 MMOL/L (ref 7–15)
BASOPHILS # BLD AUTO: 0 10E3/UL (ref 0–0.2)
BASOPHILS NFR BLD AUTO: 1 %
BUN SERPL-MCNC: 17 MG/DL (ref 8–23)
CALCIUM SERPL-MCNC: 9.3 MG/DL (ref 8.8–10.2)
CHLORIDE SERPL-SCNC: 98 MMOL/L (ref 98–107)
CREAT SERPL-MCNC: 0.66 MG/DL (ref 0.51–0.95)
DEPRECATED HCO3 PLAS-SCNC: 25 MMOL/L (ref 22–29)
EGFRCR SERPLBLD CKD-EPI 2021: 84 ML/MIN/1.73M2
EOSINOPHIL # BLD AUTO: 0 10E3/UL (ref 0–0.7)
EOSINOPHIL NFR BLD AUTO: 0 %
ERYTHROCYTE [DISTWIDTH] IN BLOOD BY AUTOMATED COUNT: 12.3 % (ref 10–15)
GLUCOSE SERPL-MCNC: 129 MG/DL (ref 70–99)
HCT VFR BLD AUTO: 36 % (ref 35–47)
HGB BLD-MCNC: 11.8 G/DL (ref 11.7–15.7)
HOLD SPECIMEN: NORMAL
HOLD SPECIMEN: NORMAL
IMM GRANULOCYTES # BLD: 0 10E3/UL
IMM GRANULOCYTES NFR BLD: 0 %
LYMPHOCYTES # BLD AUTO: 1.2 10E3/UL (ref 0.8–5.3)
LYMPHOCYTES NFR BLD AUTO: 20 %
MAGNESIUM SERPL-MCNC: 1.9 MG/DL (ref 1.7–2.3)
MCH RBC QN AUTO: 33 PG (ref 26.5–33)
MCHC RBC AUTO-ENTMCNC: 32.8 G/DL (ref 31.5–36.5)
MCV RBC AUTO: 101 FL (ref 78–100)
MONOCYTES # BLD AUTO: 0.6 10E3/UL (ref 0–1.3)
MONOCYTES NFR BLD AUTO: 10 %
NEUTROPHILS # BLD AUTO: 4.4 10E3/UL (ref 1.6–8.3)
NEUTROPHILS NFR BLD AUTO: 69 %
NRBC # BLD AUTO: 0 10E3/UL
NRBC BLD AUTO-RTO: 0 /100
NT-PROBNP SERPL-MCNC: 522 PG/ML (ref 0–1800)
PLATELET # BLD AUTO: 236 10E3/UL (ref 150–450)
POTASSIUM SERPL-SCNC: 4 MMOL/L (ref 3.4–5.3)
RBC # BLD AUTO: 3.58 10E6/UL (ref 3.8–5.2)
SODIUM SERPL-SCNC: 133 MMOL/L (ref 135–145)
T4 FREE SERPL-MCNC: 1.4 NG/DL (ref 0.9–1.7)
TROPONIN T SERPL HS-MCNC: 12 NG/L
TSH SERPL DL<=0.005 MIU/L-ACNC: 6.04 UIU/ML (ref 0.3–4.2)
WBC # BLD AUTO: 6.3 10E3/UL (ref 4–11)

## 2023-09-26 PROCEDURE — 85025 COMPLETE CBC W/AUTO DIFF WBC: CPT | Performed by: EMERGENCY MEDICINE

## 2023-09-26 PROCEDURE — 93005 ELECTROCARDIOGRAM TRACING: CPT

## 2023-09-26 PROCEDURE — 83880 ASSAY OF NATRIURETIC PEPTIDE: CPT | Performed by: EMERGENCY MEDICINE

## 2023-09-26 PROCEDURE — 93005 ELECTROCARDIOGRAM TRACING: CPT | Mod: 76

## 2023-09-26 PROCEDURE — 84439 ASSAY OF FREE THYROXINE: CPT | Performed by: EMERGENCY MEDICINE

## 2023-09-26 PROCEDURE — 84484 ASSAY OF TROPONIN QUANT: CPT | Performed by: EMERGENCY MEDICINE

## 2023-09-26 PROCEDURE — 82310 ASSAY OF CALCIUM: CPT | Performed by: EMERGENCY MEDICINE

## 2023-09-26 PROCEDURE — 83735 ASSAY OF MAGNESIUM: CPT | Performed by: EMERGENCY MEDICINE

## 2023-09-26 PROCEDURE — 84443 ASSAY THYROID STIM HORMONE: CPT | Performed by: EMERGENCY MEDICINE

## 2023-09-26 PROCEDURE — 36415 COLL VENOUS BLD VENIPUNCTURE: CPT | Performed by: EMERGENCY MEDICINE

## 2023-09-26 PROCEDURE — 99284 EMERGENCY DEPT VISIT MOD MDM: CPT

## 2023-09-26 ASSESSMENT — ACTIVITIES OF DAILY LIVING (ADL): ADLS_ACUITY_SCORE: 37

## 2023-09-26 NOTE — TELEPHONE ENCOUNTER
Chest discomfort and dizziness.   Upper chest dull discomfort began 5:45pm and it comes and goes, she has it now. MRI this morning of head and neck and there was fluid injected into her system for this. (Contrast). I was OK through the afternoon. Told to drink 2 big glasses of water (done). Suddenly 5:45pm tachycardia lasting for 1/2 hr. She laid down but it didn't go away. She lives in assisted community @ [facility] and had LPN take her blood pressure=159/126 P111 @ 6pm. Now it is not beating so fast, but it is irregular. She is lightheaded. Able to stand and walk, but is careful, balance feels off. Hx of HTN, atrial fibrillation: on medications.    Triaged to a disposition of Call 911. Patient states she will call her brother now, and go to the nearest ER.    Nataly Rey RN Triage Nurse Advisor 6:31 PM 9/26/2023  Reason for Disposition   Chest pain   [1] Chest pain lasts > 5 minutes AND [2] age > 44   [1] Chest pain lasts > 5 minutes AND [2] age > 30 AND [3] one or more cardiac risk factors (e.g., diabetes, high blood pressure, high cholesterol, smoker, or strong family history of heart disease)    Additional Information   Negative: Passed out (i.e., lost consciousness, collapsed and was not responding)   Negative: Shock suspected (e.g., cold/pale/clammy skin, too weak to stand, low BP, rapid pulse)   Negative: Difficult to awaken or acting confused (e.g., disoriented, slurred speech)   Negative: Visible sweat on face or sweat dripping down face   Negative: Unable to walk, or can only walk with assistance (e.g., requires support)   Negative: [1] Received SHOCK from implantable cardiac defibrillator AND [2] persisting symptoms (i.e., palpitations, lightheadedness)   Negative: [1] Dizziness, lightheadedness, or weakness AND [2] heart beating very rapidly (e.g., > 140 / minute)   Negative: [1] Dizziness, lightheadedness, or weakness AND [2] heart beating very slowly (e.g., < 50 / minute)   Negative: Sounds like a  life-threatening emergency to the triager   Negative: SEVERE difficulty breathing (e.g., struggling for each breath, speaks in single words)   Negative: Difficult to awaken or acting confused (e.g., disoriented, slurred speech)   Negative: Shock suspected (e.g., cold/pale/clammy skin, too weak to stand, low BP, rapid pulse)   Negative: Passed out (i.e., lost consciousness, collapsed and was not responding)    Protocols used: Heart Rate and Heartbeat Jyctulmnd-T-HU, Chest Pain-A-AH

## 2023-09-27 LAB
ATRIAL RATE - MUSE: 115 BPM
ATRIAL RATE - MUSE: 86 BPM
DIASTOLIC BLOOD PRESSURE - MUSE: NORMAL MMHG
DIASTOLIC BLOOD PRESSURE - MUSE: NORMAL MMHG
INTERPRETATION ECG - MUSE: NORMAL
INTERPRETATION ECG - MUSE: NORMAL
P AXIS - MUSE: 70 DEGREES
P AXIS - MUSE: NORMAL DEGREES
PR INTERVAL - MUSE: 188 MS
PR INTERVAL - MUSE: NORMAL MS
QRS DURATION - MUSE: 74 MS
QRS DURATION - MUSE: 82 MS
QT - MUSE: 290 MS
QT - MUSE: 380 MS
QTC - MUSE: 399 MS
QTC - MUSE: 454 MS
R AXIS - MUSE: -17 DEGREES
R AXIS - MUSE: -27 DEGREES
SYSTOLIC BLOOD PRESSURE - MUSE: NORMAL MMHG
SYSTOLIC BLOOD PRESSURE - MUSE: NORMAL MMHG
T AXIS - MUSE: 61 DEGREES
T AXIS - MUSE: 68 DEGREES
VENTRICULAR RATE- MUSE: 114 BPM
VENTRICULAR RATE- MUSE: 86 BPM

## 2023-09-27 NOTE — ED PROVIDER NOTES
History     Chief Complaint:  Chest Pain    The history is provided by the patient.      Jennifer Whiteside is an 88 year old female with history of paroxysmal atrial fibrillation on Eliquis who presents with her son, Harvey, for chest discomfort.  She was at her baseline state of health today, even going to lunch with friends.  However, about an hour and 15 minutes prior to arrival she suddenly had palpitations with shortness of breath and discomfort in her chest.  She tried resting but it did not improve so she called nurse triage and was directed to the emergency department.  She lives in independent living at [facility] and a nurse there found her blood pressure to be elevated at 159/126 with a pulse of 111 bpm.  By the time of my evaluation her symptoms are overall improved.  She denies recent fever, cough, vomiting, diarrhea, dysuria, or leg swelling.  She does mention a left abdominal hernia that has been bothering her if she sits too long but no other issues.  She has had no medication changes or missed doses.    Independent Historian:   As above.    Review of External Notes:   I reviewed the cardiology note from 7/28/23.     Medications:    Fosamax  Eliquis  Xalatan  Toprol XL  Systane ultra  Pravachol  Zoloft    Past Medical History:    Atrial fibrillation  Depressive disorder  Elevated LFTs  Esophageal stenosis  Hyperlipidemia  Irregular heart beat  Osteoarthritis  PAD  PVD  SVT  Left inguinal hernia  Hypertension    Past Surgical History:    Appendectomy  Closed reduction wrist, right  Colonoscopy x3  Endarterectomy carotid  ENT surgery, T & A  Correct bunion, simple, right  Repair right rotator cuff, acute  Repair left rotator cuff acute  Hysterectomy vaginal  Hammer toe repair  Laminectomy, L4    Physical Exam   Patient Vitals for the past 24 hrs:   BP Temp Pulse Resp SpO2   09/26/23 2126 (!) 166/99 -- 90 15 95 %   09/26/23 2000 (!) 159/83 -- 95 13 98 %   09/26/23 1940 -- -- 89 16 97 %    09/26/23 1907 129/88 97.9  F (36.6  C) (!) 126 20 99 %     Physical Exam  General: Well-developed and well-nourished. Well appearing elderly  woman. Cooperative.  Head:  Atraumatic.  Eyes:  Conjunctivae, lids, and sclerae are normal.  ENT:    Normal nose. Moist mucous membranes.  Neck:  Supple. Normal range of motion.  CV:  Regular rate and rhythm. Normal heart sounds with no murmurs, rubs, or gallops detected.  Resp:  No respiratory distress. Clear to auscultation bilaterally without decreased breath sounds, wheezing, rales, or rhonchi.  GI:  Soft. Non-distended. Non-tender.    MS:  Normal ROM. No bilateral lower extremity edema.  Skin:  Warm. Non-diaphoretic. No pallor.  Neuro:  Awake. A&Ox3. Normal strength.  Psych: Normal mood and affect. Normal speech.  Vitals reviewed.    Emergency Department Course   EKG #1  Indication: chest pain  Time: 1918  Rate 114 bpm. QRS duration 74. QT/QTc 290/399.   Atrial fibrillation with rapid ventricular response  Minimal voltage criteria for LVH, may be normal variant  Nonspecific ST abnormality  Abnormal ECG   No acute ST changes.  Atrial fibrillation replaces sinus rhythm as compared to prior, dated 4/1/22.    EKG #2  Indication: rhythm change  Time: 2026  Rate 86 bpm. AR interval 188. QRS duration 82. QT/QTc 380/454.   Normal sinus rhythm  Possible Left atrial enlargement  Nonspecific ST abnormality  Abnormal ECG   No acute ST changes.  Sinus rhythm replaces atrial fibrillation as compared to prior, dated 9/26/23.    Laboratory:  Labs Ordered and Resulted from Time of ED Arrival to Time of ED Departure   BASIC METABOLIC PANEL - Abnormal       Result Value    Sodium 133 (*)     Potassium 4.0      Chloride 98      Carbon Dioxide (CO2) 25      Anion Gap 10      Urea Nitrogen 17.0      Creatinine 0.66      GFR Estimate 84      Calcium 9.3      Glucose 129 (*)    CBC WITH PLATELETS AND DIFFERENTIAL - Abnormal    WBC Count 6.3      RBC Count 3.58 (*)     Hemoglobin 11.8       Hematocrit 36.0       (*)     MCH 33.0      MCHC 32.8      RDW 12.3      Platelet Count 236      % Neutrophils 69      % Lymphocytes 20      % Monocytes 10      % Eosinophils 0      % Basophils 1      % Immature Granulocytes 0      NRBCs per 100 WBC 0      Absolute Neutrophils 4.4      Absolute Lymphocytes 1.2      Absolute Monocytes 0.6      Absolute Eosinophils 0.0      Absolute Basophils 0.0      Absolute Immature Granulocytes 0.0      Absolute NRBCs 0.0     TSH WITH FREE T4 REFLEX - Abnormal    TSH 6.04 (*)    TROPONIN T, HIGH SENSITIVITY - Normal    Troponin T, High Sensitivity 12     NT PROBNP INPATIENT - Normal    N terminal Pro BNP Inpatient 522     MAGNESIUM - Normal    Magnesium 1.9     T4 FREE - Normal    Free T4 1.40       Emergency Department Course & Assessments:  Assessments:  1945 I obtained history and examined the patient as noted above.  2125 I rechecked the patient.     Independent Interpretation (X-rays, CTs, rhythm strip):  Not applicable    Consultations/Discussion of Management or Tests:   ED Course as of 09/26/23 2136   Tue Sep 26, 2023   2125 I rechecked the patient. I discussed findings and discharge. All questions answered.      Social Determinants of Health affecting care:   The patient has established care providers and a supportive son.    Disposition:  The patient was discharged.     Impression & Plan    Medical Decision Making:  Keiko is an 88 year old woman with PAF on Eliquis who presents with palpitations, shortness of breath, and discomfort in her chest for about 75 minutes.  Upon arrival she is tachycardic with EKG confirming atrial fibrillation with RVR.  However, by the time of my interview the patient has spontaneously converted to sinus rhythm with EKG confirming this.  She does not have ischemic changes.  Her symptoms are resolved.  Laboratory studies are reassuring.  There is no electrolyte derangement, anemia, leukocytosis, or abnormal troponin.  She is not  "volume overloaded.  Although TSH is abnormal, T4 is normal and this is an unlikely source of her arrhythmia.    She remained in sinus rhythm and without symptoms through her emergency department course and was anxiously requesting discharge.  I think this is appropriate course of action. I recommended she continue her Eliquis.  I recommended she reach out to her cardiologist as she may be a candidate for \"a pill in the pocket\" type treatment.  However, she understands she needs to return immediately if she has recurrent symptoms or new concerns.  All questions answered.    Diagnosis:    ICD-10-CM    1. PAF (paroxysmal atrial fibrillation) (H)  I48.0       2. Anticoagulated  Z79.01         Scribe Disclosure:  I, Darleen Haney, am serving as a scribe at 7:45 PM on 9/26/2023 to document services personally performed by Roxanna Bates MD based on my observations and the provider's statements to me.   9/26/2023   Roxanna Bates MD Dixson, Kylie S, MD  09/28/23 2117    "

## 2023-09-27 NOTE — ED TRIAGE NOTES
Since 1800, patient has felt that her heart is irregular and fast. Hx of A-Fib. Starting to have chest pain

## 2023-09-29 ENCOUNTER — NURSE TRIAGE (OUTPATIENT)
Dept: CARDIOLOGY | Facility: CLINIC | Age: 88
End: 2023-09-29
Payer: COMMERCIAL

## 2023-09-29 ENCOUNTER — TELEPHONE (OUTPATIENT)
Dept: CARDIOLOGY | Facility: CLINIC | Age: 88
End: 2023-09-29
Payer: COMMERCIAL

## 2023-09-29 NOTE — TELEPHONE ENCOUNTER
[facility] Call Center    Phone Message    May a detailed message be left on voicemail: yes     Reason for Call: Symptoms or Concerns     If patient has red-flag symptoms, warm transfer to triage line    Current symptom or concern: Pt requesting to speak to a nurse regarding high blood pressure concerns she has had since Tuesday 09/26/23    Symptoms have been present for:  4 day(s)    Has patient previously been seen for this? Yes    By : ED 09/26/23    Date: 09/26/23    Are there any new or worsening symptoms? No    Action Taken: Patient transferred to: Triage    Travel Screening: Not Applicable    Thank you!  [facility]

## 2023-09-29 NOTE — TELEPHONE ENCOUNTER
"Per Cathie Jones,     \"I would continue to monitor her blood pressures daily, at least 2 hours after a.m. medications.  I would like to hold off in uptitrating any medications for now as she complains of lightheadedness and there has been only 2 isolated episodes with borderline high blood pressure readings.  Heart rates seem to be very well controlled.  Encourage hydration    Thanks  Cathie\"    Called patient to go over recommendations. Will reassess on Monday.   Coral Ahn RN  September 29, 2023  3:48 PM  "

## 2023-09-29 NOTE — TELEPHONE ENCOUNTER
"Patient spoke to Triage regarding her concern with her blood pressure. She says around 11am her blood pressure was 175/92 HR 76. She was in the ED on Tuesday for A-Fib and was told to make an appointment for follow-up with Cardiology. She says she can't get an appointment until November 24th. Right now she has no issues with A-Fib. Patient says that she has a little lightheadedness which lessens as the day goes on. No other symptoms to report. She took Eliquis at 8am. She takes metoprolol succinate in the evening. Will route this over to the nurses with Cathie Karen to further review/follow-up with patient.     1. BLOOD PRESSURE: \"What is the blood pressure?\" \"Did you take at least two measurements 5 minutes apart?\" She took one reading this morning. 175/92 HR 76 at 11am.  2. ONSET: \"When did you take your blood pressure?\" 11am.  3. HOW: \"How did you take your blood pressure?\" (e.g., automatic home BP monitor, visiting nurse) Home BP monitor.   4. HISTORY: \"Do you have a history of high blood pressure?\" Yes.  5. MEDICINES: \"Are you taking any medicines for blood pressure?\" \"Have you missed any doses recently?\" She takes metoprolol succinate 25mg in the evening.   6. OTHER SYMPTOMS: \"Do you have any symptoms?\" (e.g., blurred vision, chest pain, difficulty breathing, headache, weakness) A little lightheadedness which she says lessens as the day goes on.     Additional Information    Negative: Systolic BP >= 160 OR Diastolic >= 100, and any cardiac (e.g., breathing difficulty, chest pain) or neurologic symptoms (e.g., new-onset blurred or double vision)    Protocols used: Blood Pressure - High-A-OH    "

## 2023-09-29 NOTE — TELEPHONE ENCOUNTER
Routing to Cathie Jones,     Patient called in with /92 and HR in 70's with mild lightheadedness. For the past 2 days, BP has been in the 160-170/ 90s with HR in the high 70's/ low 80's. Patient was called and she states she is nervous about her BP being that high and was just seen in the ED for Afib where HR was in the 100-120's. Now in the 70's. Same day dismissal in ED with no medication titration. Due to see you 11/24/23. She also states her lightheadedness is intermittent and less compared 2 days ago. No other symptoms.     Called in June 2023 for same types of symptoms and RN gave therapeutic communication regarding when to take her BP and lower anxiety to receive accurate values.     Any medication titration recommendations?    Last OV 7/28/23  1.  Lightheadedness -persistent with varying levels of intensity.  Symptoms are likely not cardiac in nature.  Unremarkable echocardiogram, negative stress test, and stable carotid disease.  She is scheduled for evaluation with neurology next month.  2.  Paroxysmal atrial fibrillation -currently in normal sinus rhythm.  Continue Toprol and Eliquis for stroke prevention.     7 day Zio patch performed in Jan 2020 showed NSR with 14 SVT runs and 2% afib burden.     Echocardiogram (4/7/2022 ) showed EF 60 to 65%, grade 1 diastolic dysfunction, mild TVR, and elevated RV systolic pressure consistent with mild pulmonary hypertension. Nuclear stress test in 2021 was negative for ischemia.      Cardiac medications verified  Metoprolol XL 25 mg daily  Pravastatin 40 mg daily  Apixaban 2.5 mg BID    Coral Ahn RN

## 2023-10-02 NOTE — TELEPHONE ENCOUNTER
Patient was to call Pallavi back today. There is a nurse triage note started. Please call her back today to discuss b/p

## 2023-10-02 NOTE — TELEPHONE ENCOUNTER
Patient called back today. She has no other symptoms but was concerned about updated blood pressure readings. Due to some values that were being said, RN questioned the validity of the at home blood pressure cuff. She stated that she will go into [facility] or [facility] and compare the two values she receives. From there, I will call her on Wednesday to review symptoms and updated readings.     Patient agreeable to plan of care.     Coral Ahn RN

## 2023-10-02 NOTE — TELEPHONE ENCOUNTER
Nursing telephone encounter already started. Will close out this encounter. Coral Ahn RN  October 2, 2023  11:53 AM

## 2023-10-04 NOTE — TELEPHONE ENCOUNTER
Blood pressures are now within 120-130 systolic over 60 diastolic. HR in the 70s and now asymptomatic.     Patient will call in with any new questions or concerns.    Coral Ahn RN

## 2023-10-06 DIAGNOSIS — I10 BENIGN ESSENTIAL HYPERTENSION: ICD-10-CM

## 2023-10-06 DIAGNOSIS — I47.10 SVT (SUPRAVENTRICULAR TACHYCARDIA) (H): ICD-10-CM

## 2023-10-06 DIAGNOSIS — I48.0 PAROXYSMAL ATRIAL FIBRILLATION (H): ICD-10-CM

## 2023-10-06 RX ORDER — METOPROLOL SUCCINATE 25 MG/1
25 TABLET, EXTENDED RELEASE ORAL EVERY EVENING
Qty: 90 TABLET | Refills: 0 | Status: SHIPPED | OUTPATIENT
Start: 2023-10-06 | End: 2024-01-03

## 2023-10-19 ASSESSMENT — ENCOUNTER SYMPTOMS
SORE THROAT: 0
ABDOMINAL PAIN: 0
CONSTIPATION: 0
PALPITATIONS: 0
NERVOUS/ANXIOUS: 0
BREAST MASS: 0
FREQUENCY: 0
HEMATOCHEZIA: 0
MYALGIAS: 0
HEMATURIA: 0
EYE PAIN: 0
DYSURIA: 0
SHORTNESS OF BREATH: 0
PARESTHESIAS: 0
HEARTBURN: 0
CHILLS: 0
WEAKNESS: 0
NAUSEA: 0
HEADACHES: 0
DIARRHEA: 0
JOINT SWELLING: 0
DIZZINESS: 0
ARTHRALGIAS: 0
COUGH: 0
FEVER: 0

## 2023-10-19 ASSESSMENT — ACTIVITIES OF DAILY LIVING (ADL): CURRENT_FUNCTION: NO ASSISTANCE NEEDED

## 2023-10-21 DIAGNOSIS — E78.5 HYPERLIPIDEMIA LDL GOAL <70: ICD-10-CM

## 2023-10-23 RX ORDER — PRAVASTATIN SODIUM 40 MG
40 TABLET ORAL DAILY
Qty: 90 TABLET | Refills: 0 | Status: SHIPPED | OUTPATIENT
Start: 2023-10-23 | End: 2024-01-18

## 2023-10-26 ENCOUNTER — OFFICE VISIT (OUTPATIENT)
Dept: INTERNAL MEDICINE | Facility: CLINIC | Age: 88
End: 2023-10-26
Payer: COMMERCIAL

## 2023-10-26 VITALS
HEART RATE: 89 BPM | WEIGHT: 127.1 LBS | OXYGEN SATURATION: 96 % | TEMPERATURE: 97.7 F | SYSTOLIC BLOOD PRESSURE: 120 MMHG | DIASTOLIC BLOOD PRESSURE: 68 MMHG | RESPIRATION RATE: 18 BRPM | HEIGHT: 64 IN | BODY MASS INDEX: 21.7 KG/M2

## 2023-10-26 DIAGNOSIS — R10.32 LLQ ABDOMINAL PAIN: ICD-10-CM

## 2023-10-26 DIAGNOSIS — I48.0 PAROXYSMAL ATRIAL FIBRILLATION (H): ICD-10-CM

## 2023-10-26 DIAGNOSIS — Z00.00 ENCOUNTER FOR MEDICARE ANNUAL WELLNESS EXAM: Primary | ICD-10-CM

## 2023-10-26 DIAGNOSIS — E78.5 HYPERLIPIDEMIA LDL GOAL <70: ICD-10-CM

## 2023-10-26 DIAGNOSIS — F41.9 ANXIETY: ICD-10-CM

## 2023-10-26 DIAGNOSIS — I77.9 BILATERAL CAROTID ARTERY DISEASE, UNSPECIFIED TYPE (H): ICD-10-CM

## 2023-10-26 DIAGNOSIS — I10 BENIGN ESSENTIAL HYPERTENSION: ICD-10-CM

## 2023-10-26 PROCEDURE — 99214 OFFICE O/P EST MOD 30 MIN: CPT | Mod: 25 | Performed by: INTERNAL MEDICINE

## 2023-10-26 PROCEDURE — G0439 PPPS, SUBSEQ VISIT: HCPCS | Performed by: INTERNAL MEDICINE

## 2023-10-26 ASSESSMENT — ENCOUNTER SYMPTOMS
EYE PAIN: 0
SHORTNESS OF BREATH: 0
SORE THROAT: 0
MYALGIAS: 0
ABDOMINAL PAIN: 0
CONSTIPATION: 0
COUGH: 0
FEVER: 0
BREAST MASS: 0
HEMATOCHEZIA: 0
NAUSEA: 0
ARTHRALGIAS: 0
HEARTBURN: 0
HEADACHES: 0
PALPITATIONS: 0
DYSURIA: 0
DIARRHEA: 0
FREQUENCY: 0
WEAKNESS: 0
DIZZINESS: 0
CHILLS: 0
PARESTHESIAS: 0
JOINT SWELLING: 0
HEMATURIA: 0
NERVOUS/ANXIOUS: 0

## 2023-10-26 ASSESSMENT — ANXIETY QUESTIONNAIRES
2. NOT BEING ABLE TO STOP OR CONTROL WORRYING: NOT AT ALL
5. BEING SO RESTLESS THAT IT IS HARD TO SIT STILL: NOT AT ALL
1. FEELING NERVOUS, ANXIOUS, OR ON EDGE: NOT AT ALL
GAD7 TOTAL SCORE: 0
6. BECOMING EASILY ANNOYED OR IRRITABLE: NOT AT ALL
7. FEELING AFRAID AS IF SOMETHING AWFUL MIGHT HAPPEN: NOT AT ALL
IF YOU CHECKED OFF ANY PROBLEMS ON THIS QUESTIONNAIRE, HOW DIFFICULT HAVE THESE PROBLEMS MADE IT FOR YOU TO DO YOUR WORK, TAKE CARE OF THINGS AT HOME, OR GET ALONG WITH OTHER PEOPLE: NOT DIFFICULT AT ALL
GAD7 TOTAL SCORE: 0
4. TROUBLE RELAXING: NOT AT ALL
3. WORRYING TOO MUCH ABOUT DIFFERENT THINGS: NOT AT ALL

## 2023-10-26 ASSESSMENT — PAIN SCALES - GENERAL: PAINLEVEL: NO PAIN (0)

## 2023-10-26 ASSESSMENT — ACTIVITIES OF DAILY LIVING (ADL): CURRENT_FUNCTION: NO ASSISTANCE NEEDED

## 2023-10-26 NOTE — PROGRESS NOTES
"Answers submitted by the patient for this visit:  LEE-7 (Submitted on 10/26/2023)  LEE 7 TOTAL SCORE: 0  SUBJECTIVE:   Keiko is a 88 year old who presents for Preventive Visit.      10/26/2023    12:37 PM   Additional Questions   Roomed by Rudy       Are you in the first 12 months of your Medicare coverage?  No    Healthy Habits:     In general, how would you rate your overall health?  Good    Frequency of exercise:  2-3 days/week    Do you usually eat at least 4 servings of fruit and vegetables a day, include whole grains    & fiber and avoid regularly eating high fat or \"junk\" foods?  Yes    Taking medications regularly:  Yes    Medication side effects:  Lightheadedness    Ability to successfully perform activities of daily living:  No assistance needed    Home Safety:  No safety concerns identified    Hearing Impairment:  No hearing concerns    In the past 6 months, have you been bothered by leaking of urine?  No    In general, how would you rate your overall mental or emotional health?  Good    Additional concerns today:  Yes      Today's PHQ-2 Score:       10/25/2023     3:15 PM   PHQ-2 ( 1999 [employer])   Q1: Little interest or pleasure in doing things 0   Q2: Feeling down, depressed or hopeless 0   PHQ-2 Score 0   Q1: Little interest or pleasure in doing things Not at all   Q2: Feeling down, depressed or hopeless Not at all   PHQ-2 Score 0       Have you ever done Advance Care Planning? (For example, a Health Directive, POLST, or a discussion with a medical provider or your loved ones about your wishes): Yes, advance care planning is on file.       Fall risk  Fallen 2 or more times in the past year?: No  Any fall with injury in the past year?: No    Cognitive Screening   1) Repeat 3 items (Leader, Season, Table)      2) Clock draw:   NORMAL  3) 3 item recall: Recalls 3 objects  Results: NORMAL clock, 1-2 items recalled: COGNITIVE IMPAIRMENT LESS LIKELY    Mini-CogTM Copyright S Fidel. Licensed by the author for " use in [facility]; reprinted with permission ([email or web address]@[facility]). All rights reserved.      Do you have sleep apnea, excessive snoring or daytime drowsiness? : no    Reviewed and updated as needed this visit by clinical staff   Tobacco  Allergies  Meds              Reviewed and updated as needed this visit by Provider                 Social History     Tobacco Use    Smoking status: Former     Packs/day: 0.50     Years: 3.00     Additional pack years: 0.00     Total pack years: 1.50     Types: Cigarettes    Smokeless tobacco: Never    Tobacco comments:     quit 1958   Substance Use Topics    Alcohol use: Not Currently     Alcohol/week: 7.0 standard drinks of alcohol     Types: 7 Glasses of wine per week             10/19/2023    12:37 PM   Alcohol Use   Prescreen: >3 drinks/day or >7 drinks/week? No     Do you have a current opioid prescription? No  Do you use any other controlled substances or medications that are not prescribed by a provider? None          Hyperlipidemia Follow-Up    Are you regularly taking any medication or supplement to lower your cholesterol?   Yes- Pravastatin  Are you having muscle aches or other side effects that you think could be caused by your cholesterol lowering medication?  No    Current providers sharing in care for this patient include:   Patient Care Team:  Carisa Waggoner MD as PCP - General (Internal Medicine)  Carisa Waggoner MD as Assigned PCP  Cathie Jones APRN CNP as Nurse Practitioner (Cardiovascular Disease)  Cathie Jones APRN CNP as Assigned Heart and Vascular Provider    The following health maintenance items are reviewed in Epic and correct as of today:  Health Maintenance   Topic Date Due    RSV VACCINE 60+ (1 - 1-dose 60+ series) Never done    COVID-19 Vaccine (6 - 2023-24 season) 09/01/2023    ANNUAL REVIEW OF HM ORDERS  10/04/2023    MEDICARE ANNUAL WELLNESS VISIT  10/04/2023    LEE ASSESSMENT  10/26/2024    FALL RISK ASSESSMENT  10/26/2024     "ADVANCE CARE PLANNING  10/26/2028    DTAP/TDAP/TD IMMUNIZATION (4 - Td or Tdap) 04/23/2031    PHQ-2 (once per calendar year)  Completed    INFLUENZA VACCINE  Completed    Pneumococcal Vaccine: 65+ Years  Completed    ZOSTER IMMUNIZATION  Completed    IPV IMMUNIZATION  Aged Out    HPV IMMUNIZATION  Aged Out    MENINGITIS IMMUNIZATION  Aged Out     Lab work is in process        Pertinent mammograms are reviewed under the imaging tab.    Review of Systems   Constitutional:  Negative for chills and fever.   HENT:  Negative for congestion, ear pain, hearing loss and sore throat.    Eyes:  Negative for pain and visual disturbance.   Respiratory:  Negative for cough and shortness of breath.    Cardiovascular:  Negative for chest pain, palpitations and peripheral edema.   Gastrointestinal:  Negative for abdominal pain, constipation, diarrhea, heartburn, hematochezia and nausea.   Breasts:  Negative for tenderness, breast mass and discharge.   Genitourinary:  Negative for dysuria, frequency, genital sores, hematuria, pelvic pain, urgency, vaginal bleeding and vaginal discharge.   Musculoskeletal:  Negative for arthralgias, joint swelling and myalgias.   Skin:  Negative for rash.   Neurological:  Negative for dizziness, weakness, headaches and paresthesias.   Psychiatric/Behavioral:  Negative for mood changes. The patient is not nervous/anxious.          OBJECTIVE:   /68   Pulse 89   Temp 97.7  F (36.5  C) (Tympanic)   Resp 18   Ht 1.613 m (5' 3.5\")   Wt 57.7 kg (127 lb 1.6 oz)   LMP  (LMP Unknown)   SpO2 96%   BMI 22.16 kg/m   Estimated body mass index is 22.16 kg/m  as calculated from the following:    Height as of this encounter: 1.613 m (5' 3.5\").    Weight as of this encounter: 57.7 kg (127 lb 1.6 oz).  Physical Exam  GENERAL: healthy, alert and no distress  EYES: Eyes grossly normal to inspection, PERRL and conjunctivae and sclerae normal  HENT: ear canals and TM's normal, nose and mouth without ulcers or " lesions  RESP: lungs clear to auscultation - no rales, rhonchi or wheezes  CV: regular rate and rhythm, normal S1 S2  ABDOMEN: soft, nontender, no hepatosplenomegaly, no masses.  MS: no gross musculoskeletal defects noted, no edema  NEURO: Normal strength and tone, mentation intact and speech normal  PSYCH: mentation appears normal, affect normal/bright        ASSESSMENT / PLAN:   (Z00.00) Encounter for Medicare annual wellness exam  (primary encounter diagnosis)  Comment: Fasting lab work ordered.        (I48.0) Paroxysmal atrial fibrillation (H)  Comment: Follows up with cardiology team.  Symptoms stable.      (I10) Benign essential hypertension  Comment: Chronic, stable, within target.  Target of less than 140/90.  Continue current antihypertensive regimen without any changes.  Update electrolytes/creatinine check.  Plan: Basic metabolic panel            (E78.5) Hyperlipidemia LDL goal <70  Plan: Lipid panel reflex to direct LDL Fasting            (I77.9) Bilateral carotid artery disease, unspecified type (H24)  Comment: Clinically stable.      (R10.32) LLQ abdominal pain  Comment: Intermittent symptoms of left lower quadrant abdominal pain most often triggered by heavy meals.  Patient is concerned about a hernia.  Bulging bulge noted during examination.  We discussed on further evaluation with possible CT scan if patient would like to hold off on that for now patient would like to continue monitoring symptoms.      Patient has been advised of split billing requirements and indicates understanding: Yes      COUNSELING:  .    .  Quite reassuring     Regular exercise       Healthy diet/nutrition       Vision screening       Hearing screening        She reports that she has quit smoking. Her smoking use included cigarettes. She has a 1.50 pack-year smoking history. She has never used smokeless tobacco.      Appropriate preventive services were discussed with this patient, including applicable screening as  appropriate for fall prevention, nutrition, physical activity, Tobacco-use cessation, weight loss and cognition.  Checklist reviewing preventive services available has been given to the patient.    Reviewed patients plan of care and provided an AVS. The Basic Care Plan (routine screening as documented in Health Maintenance) for Jennifer meets the Care Plan requirement. This Care Plan has been established and reviewed with the Patient.        Piper Jim MD  [facility]    Identified Health Risks:

## 2023-11-01 ENCOUNTER — LAB (OUTPATIENT)
Dept: LAB | Facility: CLINIC | Age: 88
End: 2023-11-01
Payer: COMMERCIAL

## 2023-11-01 DIAGNOSIS — E78.5 HYPERLIPIDEMIA LDL GOAL <70: ICD-10-CM

## 2023-11-01 DIAGNOSIS — I10 BENIGN ESSENTIAL HYPERTENSION: ICD-10-CM

## 2023-11-01 LAB
ANION GAP SERPL CALCULATED.3IONS-SCNC: 11 MMOL/L (ref 7–15)
BUN SERPL-MCNC: 12.8 MG/DL (ref 8–23)
CALCIUM SERPL-MCNC: 9.1 MG/DL (ref 8.8–10.2)
CHLORIDE SERPL-SCNC: 97 MMOL/L (ref 98–107)
CHOLEST SERPL-MCNC: 151 MG/DL
CREAT SERPL-MCNC: 0.72 MG/DL (ref 0.51–0.95)
DEPRECATED HCO3 PLAS-SCNC: 27 MMOL/L (ref 22–29)
EGFRCR SERPLBLD CKD-EPI 2021: 80 ML/MIN/1.73M2
GLUCOSE SERPL-MCNC: 91 MG/DL (ref 70–99)
HDLC SERPL-MCNC: 63 MG/DL
LDLC SERPL CALC-MCNC: 72 MG/DL
NONHDLC SERPL-MCNC: 88 MG/DL
POTASSIUM SERPL-SCNC: 3.8 MMOL/L (ref 3.4–5.3)
SODIUM SERPL-SCNC: 135 MMOL/L (ref 135–145)
TRIGL SERPL-MCNC: 80 MG/DL

## 2023-11-01 PROCEDURE — 80048 BASIC METABOLIC PNL TOTAL CA: CPT

## 2023-11-01 PROCEDURE — 36415 COLL VENOUS BLD VENIPUNCTURE: CPT

## 2023-11-01 PROCEDURE — 80061 LIPID PANEL: CPT

## 2023-11-24 ENCOUNTER — OFFICE VISIT (OUTPATIENT)
Dept: CARDIOLOGY | Facility: CLINIC | Age: 88
End: 2023-11-24
Payer: COMMERCIAL

## 2023-11-24 VITALS
SYSTOLIC BLOOD PRESSURE: 136 MMHG | HEART RATE: 77 BPM | WEIGHT: 126 LBS | OXYGEN SATURATION: 95 % | BODY MASS INDEX: 21.51 KG/M2 | DIASTOLIC BLOOD PRESSURE: 74 MMHG | HEIGHT: 64 IN

## 2023-11-24 DIAGNOSIS — I48.0 PAF (PAROXYSMAL ATRIAL FIBRILLATION) (H): Primary | ICD-10-CM

## 2023-11-24 DIAGNOSIS — I77.9 BILATERAL CAROTID ARTERY DISEASE, UNSPECIFIED TYPE (H): ICD-10-CM

## 2023-11-24 DIAGNOSIS — E78.5 HYPERLIPIDEMIA LDL GOAL <70: ICD-10-CM

## 2023-11-24 DIAGNOSIS — I10 BENIGN ESSENTIAL HYPERTENSION: ICD-10-CM

## 2023-11-24 PROCEDURE — 99214 OFFICE O/P EST MOD 30 MIN: CPT | Performed by: NURSE PRACTITIONER

## 2023-11-24 NOTE — PATIENT INSTRUCTIONS
Thanks for participating in a office visit with the [facility] Heart clinic today.    Doing well on a cardiac standpoint   Atrial fibrillation well controlled on Eliquis and metoprolol.   Continue on all other medications.       Follow up in 6 months with TYRONE     Please call my nurse at  [phone] with any questions or concerns.    Scheduling phone number: [phone]  Reminder: Please bring in all current medications, over the counter supplements and vitamin bottles to your next appointment.

## 2023-11-24 NOTE — PROGRESS NOTES
CARDIOLOGY CLINIC NOTE    PRIMARY CARDIOLOGIST  Dr. Truong     PRIMARY CARE PHYSICIAN:  Carisa Waggoner    HISTORY OF PRESENT ILLNESS:  Jennifer Whiteside is a pleasant 88 year old patient who carries a past medical history significant for paroxysmal atrial fibrillation on anticoagulation, SVT, hypertension, hyperlipidemia, bilateral carotid artery disease status post endarterectomy, PVD, and anxiety.       She returns to the office today in follow up to an ER evaluation for chest pain.  On 9/26/2023 she presented to the emergency room with chest discomfort, palpitations, and dyspnea.  EKG showed atrial fibrillation with RVR.  She spontaneously converted to sinus rhythm.  Labs were unremarkable. TSH was elevated at 6.04. Free T4 was normal. No medication changes were made.     She is feeling well on a cardiac standpoint, denies chest pain, shortness of breath, palpitations, PND, orthopnea, presyncope, syncope, or lower extremity edema.    Blood pressure is well controlled at 136/74  Weight stable at 126 pounds    Last echocardiogram showed mild LVH, normal ejection fraction estimated at 60 to 65%, grade 1 diastolic dysfunction, mild tricuspid regurgitation and elevated RV systolic pressure, consistent with mild pulmonary hypertension.    Activity level is unchanged  Compliant with all medications      PAST MEDICAL HISTORY:  Past Medical History:   Diagnosis Date    Atrial fibrillation (H) 5/2012    with RVR.  Occurred POD #2 following carotid enarterectomy.    Depressive disorder     Elevated LFTs     occurred on lipitor; resolved off medications    Esophageal stenosis     Hyperlipidaemia     Hyperlipidemia     Irregular heart beat     New onset atrial fibrillation (H) 5/13/2012    Osteoarthrosis     PAD (peripheral artery disease) (H24)     PVD (peripheral vascular disease) (H24)     s/p carotid enarterectomy 5-10-12    SVT (supraventricular tachycardia) 4/21/2020       MEDICATIONS:  Current Outpatient  Medications   Medication    apixaban ANTICOAGULANT (ELIQUIS) 2.5 MG tablet    GARLIC PO    latanoprost (XALATAN) 0.005 % ophthalmic solution    metoprolol succinate ER (TOPROL XL) 25 MG 24 hr tablet    omega-3 fatty acids 1200 MG capsule    polyethylene glycol-propylene glycol (SYSTANE ULTRA) 0.4-0.3 % SOLN ophthalmic solution    pravastatin (PRAVACHOL) 40 MG tablet    sertraline (ZOLOFT) 50 MG tablet    Vitamin D, Cholecalciferol, 25 MCG (1000 UT) TABS     No current facility-administered medications for this visit.       SOCIAL HISTORY:  I have reviewed this patient's social history and updated it with pertinent information if needed. Jennifer Whiteside  reports that she has quit smoking. Her smoking use included cigarettes. She has a 1.50 pack-year smoking history. She has never used smokeless tobacco. She reports current alcohol use of about 7.0 - 14.0 standard drinks of alcohol per week. She reports that she does not use drugs.    PHYSICAL EXAM:  Pulse:  [77] 77  BP: (136)/(74) 136/74  SpO2:  [95 %] 95 %  126 lbs 0 oz    Constitutional: alert, no distress  Respiratory: Good bilateral air entry  Cardiovascular: Regular rate and rhythm  GI: nondistended  Neuropsychiatric: appropriate affact    ASSESSMENT/PLAN:  Pertinent issues addressed/ reviewed during this cardiology visit  Paroxysmal atrial fibrillation -currently in regular rhythm, continue metoprolol and Eliquis  Hypertension -well-controlled  Hyperlipidemia -continue pravastatin  Carotid disease -status post endarterectomy, continue statin and anticoagulation    It was a pleasure seeing this patient in clinic today. Please do not hesitate to contact me with any future questions.     PAVITHRA Charles, CNP  Cardiology - [facility]  11/24/2023    Review of the result(s) of each unique test - Last echocardiogram, CBC, BMP, and lipid panel     The level of medical decision making during this visit was of moderate complexity.    This note was  completed in part using dictation via the Dragon voice recognition software. Some word and grammatical errors may occur and must be interpreted in the appropriate clinical context.  If there are any questions pertaining to this issue, please contact me for further clarification.

## 2023-11-24 NOTE — LETTER
11/24/2023    Carisa Waggoner MD  [address] [facility] [address]  [facility] [address]    RE: Jennifer Whiteside       Dear Colleague,     I had the pleasure of seeing Jennifer Whiteside in the [facility].  CARDIOLOGY CLINIC NOTE    PRIMARY CARDIOLOGIST  Dr. Truong     PRIMARY CARE PHYSICIAN:  Carisa Waggoner    HISTORY OF PRESENT ILLNESS:  Jennifer Whiteside is a pleasant 88 year old patient who carries a past medical history significant for paroxysmal atrial fibrillation on anticoagulation, SVT, hypertension, hyperlipidemia, bilateral carotid artery disease status post endarterectomy, PVD, and anxiety.       She returns to the office today in follow up to an ER evaluation for chest pain.  On 9/26/2023 she presented to the emergency room with chest discomfort, palpitations, and dyspnea.  EKG showed atrial fibrillation with RVR.  She spontaneously converted to sinus rhythm.  Labs were unremarkable. TSH was elevated at 6.04. Free T4 was normal. No medication changes were made.     She is feeling well on a cardiac standpoint, denies chest pain, shortness of breath, palpitations, PND, orthopnea, presyncope, syncope, or lower extremity edema.    Blood pressure is well controlled at 136/74  Weight stable at 126 pounds    Last echocardiogram showed mild LVH, normal ejection fraction estimated at 60 to 65%, grade 1 diastolic dysfunction, mild tricuspid regurgitation and elevated RV systolic pressure, consistent with mild pulmonary hypertension.    Activity level is unchanged  Compliant with all medications      PAST MEDICAL HISTORY:  Past Medical History:   Diagnosis Date    Atrial fibrillation (H) 5/2012    with RVR.  Occurred POD #2 following carotid enarterectomy.    Depressive disorder     Elevated LFTs     occurred on lipitor; resolved off medications    Esophageal stenosis     Hyperlipidaemia     Hyperlipidemia     Irregular heart beat     New onset atrial fibrillation (H) 5/13/2012     Osteoarthrosis     PAD (peripheral artery disease) (H24)     PVD (peripheral vascular disease) (H24)     s/p carotid enarterectomy 5-10-12    SVT (supraventricular tachycardia) 4/21/2020       MEDICATIONS:  Current Outpatient Medications   Medication    apixaban ANTICOAGULANT (ELIQUIS) 2.5 MG tablet    GARLIC PO    latanoprost (XALATAN) 0.005 % ophthalmic solution    metoprolol succinate ER (TOPROL XL) 25 MG 24 hr tablet    omega-3 fatty acids 1200 MG capsule    polyethylene glycol-propylene glycol (SYSTANE ULTRA) 0.4-0.3 % SOLN ophthalmic solution    pravastatin (PRAVACHOL) 40 MG tablet    sertraline (ZOLOFT) 50 MG tablet    Vitamin D, Cholecalciferol, 25 MCG (1000 UT) TABS     No current facility-administered medications for this visit.       SOCIAL HISTORY:  I have reviewed this patient's social history and updated it with pertinent information if needed. Jennifer Whiteside  reports that she has quit smoking. Her smoking use included cigarettes. She has a 1.50 pack-year smoking history. She has never used smokeless tobacco. She reports current alcohol use of about 7.0 - 14.0 standard drinks of alcohol per week. She reports that she does not use drugs.    PHYSICAL EXAM:  Pulse:  [77] 77  BP: (136)/(74) 136/74  SpO2:  [95 %] 95 %  126 lbs 0 oz    Constitutional: alert, no distress  Respiratory: Good bilateral air entry  Cardiovascular: Regular rate and rhythm  GI: nondistended  Neuropsychiatric: appropriate affact    ASSESSMENT/PLAN:  Pertinent issues addressed/ reviewed during this cardiology visit  Paroxysmal atrial fibrillation -currently in regular rhythm, continue metoprolol and Eliquis  Hypertension -well-controlled  Hyperlipidemia -continue pravastatin  Carotid disease -status post endarterectomy, continue statin and anticoagulation    It was a pleasure seeing this patient in clinic today. Please do not hesitate to contact me with any future questions.     PAVITHRA Charles, CNP  Cardiology - [facility]  Heart  11/24/2023    Review of the result(s) of each unique test - Last echocardiogram, CBC, BMP, and lipid panel     The level of medical decision making during this visit was of moderate complexity.    This note was completed in part using dictation via the Dragon voice recognition software. Some word and grammatical errors may occur and must be interpreted in the appropriate clinical context.  If there are any questions pertaining to this issue, please contact me for further clarification.    [facility]  cc:   Carisa Waggoner MD  [facility] [address] [address]

## 2023-11-27 ENCOUNTER — TELEPHONE (OUTPATIENT)
Dept: INTERNAL MEDICINE | Facility: CLINIC | Age: 88
End: 2023-11-27
Payer: COMMERCIAL

## 2023-11-27 NOTE — TELEPHONE ENCOUNTER
"Pt calls asking about taking a memory medication OTC. It is called Prevagen. She is asking if OK to take with all of her other medications.     Please advise.     She says she does \"dumb things\" like she will get up and cannot remember why she got up.           "

## 2023-11-29 NOTE — TELEPHONE ENCOUNTER
Prevagen is an over-the-counter supplement and the likelihood of health benefit is very small.  If patient is having symptoms of forgetfulness or cognitive decline,evaluation with the neuropsychiatry team is recommended.

## 2024-01-03 DIAGNOSIS — I10 BENIGN ESSENTIAL HYPERTENSION: ICD-10-CM

## 2024-01-03 DIAGNOSIS — I48.0 PAROXYSMAL ATRIAL FIBRILLATION (H): ICD-10-CM

## 2024-01-03 DIAGNOSIS — I47.10 SVT (SUPRAVENTRICULAR TACHYCARDIA) (H): ICD-10-CM

## 2024-01-03 RX ORDER — METOPROLOL SUCCINATE 25 MG/1
25 TABLET, EXTENDED RELEASE ORAL EVERY EVENING
Qty: 90 TABLET | Refills: 2 | Status: SHIPPED | OUTPATIENT
Start: 2024-01-03 | End: 2024-09-30

## 2024-01-17 DIAGNOSIS — E78.5 HYPERLIPIDEMIA LDL GOAL <70: ICD-10-CM

## 2024-01-18 RX ORDER — PRAVASTATIN SODIUM 40 MG
40 TABLET ORAL DAILY
Qty: 90 TABLET | Refills: 0 | Status: SHIPPED | OUTPATIENT
Start: 2024-01-18 | End: 2024-04-19

## 2024-02-07 ENCOUNTER — TRANSFERRED RECORDS (OUTPATIENT)
Dept: HEALTH INFORMATION MANAGEMENT | Facility: CLINIC | Age: 89
End: 2024-02-07
Payer: COMMERCIAL

## 2024-03-06 DIAGNOSIS — I48.0 PAROXYSMAL ATRIAL FIBRILLATION (H): ICD-10-CM

## 2024-04-18 DIAGNOSIS — E78.5 HYPERLIPIDEMIA LDL GOAL <70: ICD-10-CM

## 2024-04-19 RX ORDER — PRAVASTATIN SODIUM 40 MG
40 TABLET ORAL DAILY
Qty: 90 TABLET | Refills: 1 | Status: SHIPPED | OUTPATIENT
Start: 2024-04-19

## 2024-05-24 NOTE — PROGRESS NOTES
CARDIOLOGY CLINIC NOTE    PRIMARY CARDIOLOGIST  Dr. Truong     PRIMARY CARE PHYSICIAN:  Piper Jim    HISTORY OF PRESENT ILLNESS:  Jennifer Whiteside is a very pleasant 89-year-old female with a past medical history significant for paroxysmal atrial fibrillation on chronic anticoagulation, SVT, hypertension, hyperlipidemia, bilateral carotid artery disease status post endarterectomy, PVD and anxiety.    She returns to the office today for a 6 follow-up.  Overall, she is feeling well on a cardiac standpoint, denies chest pain, shortness of breath, palpitations, PND, orthopnea, presyncope, syncope, or lower extremity edema.  She still struggles with occasional lightheadedness.  She follows with neurology.    Nuclear stress test in 2021 was negative for inducible myocardial ischemia or infarction.  Echocardiogram on 2022 showed a normal ejection fraction estimated at 60 to 65%, grade 1 diastolic dysfunction, mild tricuspid regurgitation and elevated RV systolic pressure consistent with mild pulmonary hypertension.    CTA head/neck -Mild/moderate atherosclerotic disease of the right carotid bifurcation and proximal internal carotid  artery without hemodynamically significant narrowing by NASCET criteria. No significant atherosclerosis affecting the left carotid artery.     She does not notice any atrial fibrillation or SVT.  Well-controlled on metoprolol and low-dose Eliquis.  Blood pressure was mildly elevated 146/76 with repeat reading 132/78    Last BMP and CBC were unremarkable  Lipid panel showed a total cholesterol of 151, HDL 63, LDL 72, and triglycerides 80    She remains very active at her living facility  Compliant with all medications.        PAST MEDICAL HISTORY:  Past Medical History:   Diagnosis Date    Atrial fibrillation (H) 5/2012    with RVR.  Occurred POD #2 following carotid enarterectomy.    Depressive disorder     Elevated LFTs     occurred on lipitor; resolved off medications    Esophageal  stenosis     Hyperlipidaemia     Hyperlipidemia     Irregular heart beat     New onset atrial fibrillation (H) 5/13/2012    Osteoarthrosis     PAD (peripheral artery disease) (H24)     PVD (peripheral vascular disease) (H24)     s/p carotid enarterectomy 5-10-12    SVT (supraventricular tachycardia) (H24) 4/21/2020       MEDICATIONS:  Current Outpatient Medications   Medication Sig Dispense Refill    apixaban ANTICOAGULANT (ELIQUIS) 2.5 MG tablet Take 1 tablet (2.5 mg) by mouth 2 times daily 180 tablet 0    GARLIC PO Take 1 tablet by mouth daily       latanoprost (XALATAN) 0.005 % ophthalmic solution Place 1 drop into both eyes At Bedtime      metoprolol succinate ER (TOPROL XL) 25 MG 24 hr tablet Take 1 tablet by mouth every evening 90 tablet 2    multivitamin w/minerals (MULTI-VITAMIN) tablet Take 1 tablet by mouth daily      omega-3 fatty acids 1200 MG capsule Take 1 capsule by mouth 2 times daily.      polyethylene glycol-propylene glycol (SYSTANE ULTRA) 0.4-0.3 % SOLN ophthalmic solution Place 1 drop into both eyes 2 times daily      pravastatin (PRAVACHOL) 40 MG tablet Take 1 tablet by mouth once daily 90 tablet 1    sertraline (ZOLOFT) 50 MG tablet Take 1 tablet (50 mg) by mouth daily 90 tablet 3    Vitamin D, Cholecalciferol, 25 MCG (1000 UT) TABS Take 25 mcg by mouth daily        No current facility-administered medications for this visit.       SOCIAL HISTORY:  I have reviewed this patient's social history and updated it with pertinent information if needed. Jennifer Law Stevo  reports that she has quit smoking. Her smoking use included cigarettes. She has a 1.5 pack-year smoking history. She has never used smokeless tobacco. She reports current alcohol use of about 7.0 - 14.0 standard drinks of alcohol per week. She reports that she does not use drugs.    PHYSICAL EXAM:  Pulse:  [79] 79  BP: (132-146)/(76-78) 132/78  SpO2:  [98 %] 98 %  125 lbs 3.2 oz    Constitutional: alert, no  distress  Respiratory: Good bilateral air entry  Cardiovascular: Regular rate and rhythm  GI: nondistended  Neuropsychiatric: appropriate affact    ASSESSMENT/PLAN:  Pertinent issues addressed/ reviewed during this cardiology visit  Paroxysmal atrial fibrillation -currently in regular rhythm, managed on Toprol and low-dose Eliquis  Hypertension -well-controlled  Hyperlipidemia -LDL at goal, continue pravastatin  Carotid disease - Mild to moderate right carotid disease, no left carotid stenosis. On statin and anticoagulation.     Follow up in 6 months with TYRONE or sooner if needed.     It was a pleasure seeing this patient in clinic today. Please do not hesitate to contact me with any future questions.     PAVITHRA Charles, CNP  Cardiology - [facility]  05/30/2024       The level of medical decision making during this visit was of moderate complexity.    This note was completed in part using dictation via the Dragon voice recognition software. Some word and grammatical errors may occur and must be interpreted in the appropriate clinical context.  If there are any questions pertaining to this issue, please contact me for further clarification.

## 2024-05-30 ENCOUNTER — OFFICE VISIT (OUTPATIENT)
Dept: CARDIOLOGY | Facility: CLINIC | Age: 89
End: 2024-05-30
Attending: NURSE PRACTITIONER
Payer: COMMERCIAL

## 2024-05-30 VITALS
HEART RATE: 79 BPM | BODY MASS INDEX: 21.37 KG/M2 | WEIGHT: 125.2 LBS | OXYGEN SATURATION: 98 % | SYSTOLIC BLOOD PRESSURE: 132 MMHG | DIASTOLIC BLOOD PRESSURE: 78 MMHG | HEIGHT: 64 IN

## 2024-05-30 DIAGNOSIS — I10 BENIGN ESSENTIAL HYPERTENSION: ICD-10-CM

## 2024-05-30 DIAGNOSIS — E78.5 HYPERLIPIDEMIA LDL GOAL <70: ICD-10-CM

## 2024-05-30 DIAGNOSIS — I77.9 BILATERAL CAROTID ARTERY DISEASE, UNSPECIFIED TYPE (H): ICD-10-CM

## 2024-05-30 DIAGNOSIS — I48.0 PAF (PAROXYSMAL ATRIAL FIBRILLATION) (H): Primary | ICD-10-CM

## 2024-05-30 PROCEDURE — 99214 OFFICE O/P EST MOD 30 MIN: CPT | Performed by: NURSE PRACTITIONER

## 2024-05-30 RX ORDER — MULTIVITAMIN
1 TABLET ORAL DAILY
COMMUNITY

## 2024-05-30 NOTE — LETTER
5/30/2024    Piper Jim MD  [address] [facility] [address]    RE: Jennifer Castorenapamela       Dear Colleague,     I had the pleasure of seeing Jennifer Whiteside in the [facility].  CARDIOLOGY CLINIC NOTE    PRIMARY CARDIOLOGIST  Dr. Truong     PRIMARY CARE PHYSICIAN:  Piper Jim    HISTORY OF PRESENT ILLNESS:  Jennifer Whiteside is a very pleasant 89-year-old female with a past medical history significant for paroxysmal atrial fibrillation on chronic anticoagulation, SVT, hypertension, hyperlipidemia, bilateral carotid artery disease status post endarterectomy, PVD and anxiety.    She returns to the office today for a 6 follow-up.  Overall, she is feeling well on a cardiac standpoint, denies chest pain, shortness of breath, palpitations, PND, orthopnea, presyncope, syncope, or lower extremity edema.  She still struggles with occasional lightheadedness.  She follows with neurology.    Nuclear stress test in 2021 was negative for inducible myocardial ischemia or infarction.  Echocardiogram on 2022 showed a normal ejection fraction estimated at 60 to 65%, grade 1 diastolic dysfunction, mild tricuspid regurgitation and elevated RV systolic pressure consistent with mild pulmonary hypertension.    CTA head/neck -Mild/moderate atherosclerotic disease of the right carotid bifurcation and proximal internal carotid  artery without hemodynamically significant narrowing by NASCET criteria. No significant atherosclerosis affecting the left carotid artery.     She does not notice any atrial fibrillation or SVT.  Well-controlled on metoprolol and low-dose Eliquis.  Blood pressure was mildly elevated 146/76 with repeat reading 132/78    Last BMP and CBC were unremarkable  Lipid panel showed a total cholesterol of 151, HDL 63, LDL 72, and triglycerides 80    She remains very active at her living facility  Compliant with all medications.        PAST MEDICAL HISTORY:  Past Medical History:    Diagnosis Date    Atrial fibrillation (H) 5/2012    with RVR.  Occurred POD #2 following carotid enarterectomy.    Depressive disorder     Elevated LFTs     occurred on lipitor; resolved off medications    Esophageal stenosis     Hyperlipidaemia     Hyperlipidemia     Irregular heart beat     New onset atrial fibrillation (H) 5/13/2012    Osteoarthrosis     PAD (peripheral artery disease) (H24)     PVD (peripheral vascular disease) (H24)     s/p carotid enarterectomy 5-10-12    SVT (supraventricular tachycardia) (H24) 4/21/2020       MEDICATIONS:  Current Outpatient Medications   Medication Sig Dispense Refill    apixaban ANTICOAGULANT (ELIQUIS) 2.5 MG tablet Take 1 tablet (2.5 mg) by mouth 2 times daily 180 tablet 0    GARLIC PO Take 1 tablet by mouth daily       latanoprost (XALATAN) 0.005 % ophthalmic solution Place 1 drop into both eyes At Bedtime      metoprolol succinate ER (TOPROL XL) 25 MG 24 hr tablet Take 1 tablet by mouth every evening 90 tablet 2    multivitamin w/minerals (MULTI-VITAMIN) tablet Take 1 tablet by mouth daily      omega-3 fatty acids 1200 MG capsule Take 1 capsule by mouth 2 times daily.      polyethylene glycol-propylene glycol (SYSTANE ULTRA) 0.4-0.3 % SOLN ophthalmic solution Place 1 drop into both eyes 2 times daily      pravastatin (PRAVACHOL) 40 MG tablet Take 1 tablet by mouth once daily 90 tablet 1    sertraline (ZOLOFT) 50 MG tablet Take 1 tablet (50 mg) by mouth daily 90 tablet 3    Vitamin D, Cholecalciferol, 25 MCG (1000 UT) TABS Take 25 mcg by mouth daily        No current facility-administered medications for this visit.       SOCIAL HISTORY:  I have reviewed this patient's social history and updated it with pertinent information if needed. Jennifer Law Stevo  reports that she has quit smoking. Her smoking use included cigarettes. She has a 1.5 pack-year smoking history. She has never used smokeless tobacco. She reports current alcohol use of about 7.0 - 14.0  standard drinks of alcohol per week. She reports that she does not use drugs.    PHYSICAL EXAM:  Pulse:  [79] 79  BP: (132-146)/(76-78) 132/78  SpO2:  [98 %] 98 %  125 lbs 3.2 oz    Constitutional: alert, no distress  Respiratory: Good bilateral air entry  Cardiovascular: Regular rate and rhythm  GI: nondistended  Neuropsychiatric: appropriate affact    ASSESSMENT/PLAN:  Pertinent issues addressed/ reviewed during this cardiology visit  Paroxysmal atrial fibrillation -currently in regular rhythm, managed on Toprol and low-dose Eliquis  Hypertension -well-controlled  Hyperlipidemia -LDL at goal, continue pravastatin  Carotid disease - Mild to moderate right carotid disease, no left carotid stenosis. On statin and anticoagulation.     Follow up in 6 months with TYRONE or sooner if needed.     It was a pleasure seeing this patient in clinic today. Please do not hesitate to contact me with any future questions.     PAVITHRA Charles, CNP  Cardiology - [facility]  05/30/2024       The level of medical decision making during this visit was of moderate complexity.    This note was completed in part using dictation via the Dragon voice recognition software. Some word and grammatical errors may occur and must be interpreted in the appropriate clinical context.  If there are any questions pertaining to this issue, please contact me for further clarification.    Your blood pressure was elevated at your appointment today.  Elevated blood pressure can increase your risk of a heart attack, stroke and heart failure.  For this reason, we feel it is important to monitor your blood pressure closely.  If you have access to a home blood pressure monitor or are able to check your blood pressure at a local pharmacy in the next week we would like you to do so and call our clinic with those readings. Please call [phone] ([place]) and leave a message with your name, date of birth, blood pressure reading, date and location it was  completed. If your blood pressure remains elevated your care team will be notified.  We appreciate being a part of your healthcare team and look forward to hearing from you soon.       Thank you for allowing me to participate in the care of your patient.      Sincerely,     PAVITHRA Charles CNP     [facility]  cc:   PAVITHRA Charles CNP  [address] [place] [address]

## 2024-05-30 NOTE — PATIENT INSTRUCTIONS
Thanks for participating in a office visit with the [facility] clinic today.    Doing well on a cardiac standpoint  Blood pressures well controlled.   Continue current medical therapy.     Follow up in 6 months with TYRONE     Please call my nurse at  [phone] with any questions or concerns.    Scheduling phone number: [phone]  Reminder: Please bring in all current medications, over the counter supplements and vitamin bottles to your next appointment.

## 2024-05-30 NOTE — PROGRESS NOTES
Your blood pressure was elevated at your appointment today.  Elevated blood pressure can increase your risk of a heart attack, stroke and heart failure.  For this reason, we feel it is important to monitor your blood pressure closely.  If you have access to a home blood pressure monitor or are able to check your blood pressure at a local pharmacy in the next week we would like you to do so and call our clinic with those readings. Please call [phone] ([place]) and leave a message with your name, date of birth, blood pressure reading, date and location it was completed. If your blood pressure remains elevated your care team will be notified.  We appreciate being a part of your healthcare team and look forward to hearing from you soon.

## 2024-06-04 DIAGNOSIS — I48.0 PAROXYSMAL ATRIAL FIBRILLATION (H): ICD-10-CM

## 2024-07-05 ENCOUNTER — TELEPHONE (OUTPATIENT)
Dept: CARDIOLOGY | Facility: CLINIC | Age: 89
End: 2024-07-05
Payer: COMMERCIAL

## 2024-07-05 NOTE — TELEPHONE ENCOUNTER
1st attempt- Left voicemail for the patient to call back and schedule the following:    Appointment type:  Return Cardiology  Provider:  Cathie Kaur  Return date:  November  Additional appointment(s) needed:  none  Additonal Notes:    Specialty phone number: [phone]

## 2024-09-26 ENCOUNTER — PATIENT OUTREACH (OUTPATIENT)
Dept: CARE COORDINATION | Facility: CLINIC | Age: 89
End: 2024-09-26
Payer: COMMERCIAL

## 2024-09-29 DIAGNOSIS — I47.10 SVT (SUPRAVENTRICULAR TACHYCARDIA) (H): ICD-10-CM

## 2024-09-29 DIAGNOSIS — I48.0 PAROXYSMAL ATRIAL FIBRILLATION (H): ICD-10-CM

## 2024-09-29 DIAGNOSIS — I10 BENIGN ESSENTIAL HYPERTENSION: ICD-10-CM

## 2024-09-30 RX ORDER — METOPROLOL SUCCINATE 25 MG/1
25 TABLET, EXTENDED RELEASE ORAL EVERY EVENING
Qty: 90 TABLET | Refills: 0 | Status: SHIPPED | OUTPATIENT
Start: 2024-09-30

## 2024-10-17 DIAGNOSIS — E78.5 HYPERLIPIDEMIA LDL GOAL <70: ICD-10-CM

## 2024-10-17 RX ORDER — PRAVASTATIN SODIUM 40 MG
40 TABLET ORAL DAILY
Qty: 90 TABLET | Refills: 0 | Status: SHIPPED | OUTPATIENT
Start: 2024-10-17 | End: 2024-11-04

## 2024-10-23 DIAGNOSIS — F41.9 ANXIETY: ICD-10-CM

## 2024-10-30 SDOH — HEALTH STABILITY: PHYSICAL HEALTH: ON AVERAGE, HOW MANY DAYS PER WEEK DO YOU ENGAGE IN MODERATE TO STRENUOUS EXERCISE (LIKE A BRISK WALK)?: 0 DAYS

## 2024-10-30 SDOH — HEALTH STABILITY: PHYSICAL HEALTH: ON AVERAGE, HOW MANY MINUTES DO YOU ENGAGE IN EXERCISE AT THIS LEVEL?: 30 MIN

## 2024-10-30 ASSESSMENT — SOCIAL DETERMINANTS OF HEALTH (SDOH): HOW OFTEN DO YOU GET TOGETHER WITH FRIENDS OR RELATIVES?: MORE THAN THREE TIMES A WEEK

## 2024-11-04 ENCOUNTER — OFFICE VISIT (OUTPATIENT)
Dept: INTERNAL MEDICINE | Facility: CLINIC | Age: 89
End: 2024-11-04
Payer: COMMERCIAL

## 2024-11-04 VITALS
TEMPERATURE: 97 F | BODY MASS INDEX: 21.33 KG/M2 | RESPIRATION RATE: 18 BRPM | DIASTOLIC BLOOD PRESSURE: 81 MMHG | HEART RATE: 74 BPM | HEIGHT: 65 IN | OXYGEN SATURATION: 99 % | SYSTOLIC BLOOD PRESSURE: 141 MMHG | WEIGHT: 128 LBS

## 2024-11-04 DIAGNOSIS — Z00.00 ENCOUNTER FOR ANNUAL WELLNESS VISIT (AWV) IN MEDICARE PATIENT: Primary | ICD-10-CM

## 2024-11-04 DIAGNOSIS — E78.5 HYPERLIPIDEMIA LDL GOAL <70: ICD-10-CM

## 2024-11-04 DIAGNOSIS — F41.9 ANXIETY: ICD-10-CM

## 2024-11-04 DIAGNOSIS — I48.0 PAROXYSMAL ATRIAL FIBRILLATION (H): ICD-10-CM

## 2024-11-04 DIAGNOSIS — I10 BENIGN ESSENTIAL HYPERTENSION: ICD-10-CM

## 2024-11-04 LAB
ANION GAP SERPL CALCULATED.3IONS-SCNC: 9 MMOL/L (ref 7–15)
BUN SERPL-MCNC: 13.5 MG/DL (ref 8–23)
CALCIUM SERPL-MCNC: 9.2 MG/DL (ref 8.8–10.4)
CHLORIDE SERPL-SCNC: 96 MMOL/L (ref 98–107)
CHOLEST SERPL-MCNC: 146 MG/DL
CREAT SERPL-MCNC: 0.71 MG/DL (ref 0.51–0.95)
EGFRCR SERPLBLD CKD-EPI 2021: 81 ML/MIN/1.73M2
FASTING STATUS PATIENT QL REPORTED: NO
FASTING STATUS PATIENT QL REPORTED: NO
GLUCOSE SERPL-MCNC: 97 MG/DL (ref 70–99)
HCO3 SERPL-SCNC: 27 MMOL/L (ref 22–29)
HDLC SERPL-MCNC: 57 MG/DL
LDLC SERPL CALC-MCNC: 66 MG/DL
NONHDLC SERPL-MCNC: 89 MG/DL
POTASSIUM SERPL-SCNC: 4 MMOL/L (ref 3.4–5.3)
SODIUM SERPL-SCNC: 132 MMOL/L (ref 135–145)
TRIGL SERPL-MCNC: 114 MG/DL

## 2024-11-04 PROCEDURE — 80048 BASIC METABOLIC PNL TOTAL CA: CPT | Performed by: INTERNAL MEDICINE

## 2024-11-04 PROCEDURE — 36415 COLL VENOUS BLD VENIPUNCTURE: CPT | Performed by: INTERNAL MEDICINE

## 2024-11-04 PROCEDURE — 80061 LIPID PANEL: CPT | Performed by: INTERNAL MEDICINE

## 2024-11-04 PROCEDURE — G0439 PPPS, SUBSEQ VISIT: HCPCS | Performed by: INTERNAL MEDICINE

## 2024-11-04 PROCEDURE — 99214 OFFICE O/P EST MOD 30 MIN: CPT | Mod: 25 | Performed by: INTERNAL MEDICINE

## 2024-11-04 RX ORDER — METOPROLOL SUCCINATE 25 MG/1
25 TABLET, EXTENDED RELEASE ORAL EVERY EVENING
Qty: 90 TABLET | Refills: 0 | Status: SHIPPED | OUTPATIENT
Start: 2024-12-30

## 2024-11-04 RX ORDER — PRAVASTATIN SODIUM 40 MG
40 TABLET ORAL DAILY
Qty: 90 TABLET | Refills: 2 | Status: SHIPPED | OUTPATIENT
Start: 2025-01-15

## 2024-11-04 RX ORDER — TIMOLOL MALEATE 5 MG/ML
SOLUTION/ DROPS OPHTHALMIC
COMMUNITY
Start: 2024-10-21

## 2024-11-04 ASSESSMENT — ANXIETY QUESTIONNAIRES
7. FEELING AFRAID AS IF SOMETHING AWFUL MIGHT HAPPEN: NOT AT ALL
GAD7 TOTAL SCORE: 0
GAD7 TOTAL SCORE: 0
5. BEING SO RESTLESS THAT IT IS HARD TO SIT STILL: NOT AT ALL
GAD7 TOTAL SCORE: 0
4. TROUBLE RELAXING: NOT AT ALL
1. FEELING NERVOUS, ANXIOUS, OR ON EDGE: NOT AT ALL
8. IF YOU CHECKED OFF ANY PROBLEMS, HOW DIFFICULT HAVE THESE MADE IT FOR YOU TO DO YOUR WORK, TAKE CARE OF THINGS AT HOME, OR GET ALONG WITH OTHER PEOPLE?: NOT DIFFICULT AT ALL
2. NOT BEING ABLE TO STOP OR CONTROL WORRYING: NOT AT ALL
6. BECOMING EASILY ANNOYED OR IRRITABLE: NOT AT ALL
IF YOU CHECKED OFF ANY PROBLEMS ON THIS QUESTIONNAIRE, HOW DIFFICULT HAVE THESE PROBLEMS MADE IT FOR YOU TO DO YOUR WORK, TAKE CARE OF THINGS AT HOME, OR GET ALONG WITH OTHER PEOPLE: NOT DIFFICULT AT ALL
7. FEELING AFRAID AS IF SOMETHING AWFUL MIGHT HAPPEN: NOT AT ALL
3. WORRYING TOO MUCH ABOUT DIFFERENT THINGS: NOT AT ALL

## 2024-11-04 ASSESSMENT — PAIN SCALES - GENERAL: PAINLEVEL_OUTOF10: NO PAIN (0)

## 2024-11-04 NOTE — PROGRESS NOTES
Preventive Care Visit  [facility]  Piper Jim MD, Internal Medicine  Nov 4, 2024      Assessment & Plan     Encounter for annual wellness visit (AWV) in Medicare patient  Patient has completed flu vaccination and COVID booster.    Benign essential hypertension  Continue medications at the current dose.  Update electrolytes/creatinine check.  - BASIC METABOLIC PANEL; Future  - metoprolol succinate ER (TOPROL XL) 25 MG 24 hr tablet; Take 1 tablet (25 mg) by mouth every evening.  - BASIC METABOLIC PANEL    Hyperlipidemia LDL goal <70  Update lipid panel.  Continue Pravachol at the current dose.  Tolerating medication well.  - Lipid panel reflex to direct LDL Non-fasting; Future  - pravastatin (PRAVACHOL) 40 MG tablet; Take 1 tablet (40 mg) by mouth daily.  - Lipid panel reflex to direct LDL Non-fasting    Paroxysmal atrial fibrillation (H)  Clinically stable.  Follows up with cardiology.  - metoprolol succinate ER (TOPROL XL) 25 MG 24 hr tablet; Take 1 tablet (25 mg) by mouth every evening.    Anxiety  Overall, symptoms have been stable.  Continue Zoloft at the current dose.  - sertraline (ZOLOFT) 50 MG tablet; Take 1 tablet (50 mg) by mouth daily.    Patient has been advised of split billing requirements and indicates understanding: Yes        Counseling  Appropriate preventive services were addressed with this patient via screening, questionnaire, or discussion as appropriate for fall prevention, nutrition, physical activity    Subjective   Keiko is a 89 year old, presenting for the following:  Medicare Visit (Patient here for annual wellness exam. Patient not fasting)        11/4/2024     1:45 PM   Additional Questions   Roomed by Luis COSTA MA   Accompanied by Self         11/4/2024     1:45 PM   Patient Reported Additional Medications   Patient reports taking the following new medications No           HPI  Patient comes in today for annual wellness visit and follow-up on chronic  conditions.  Overall, doing well.  No acute concerns.  Health Care Directive  Patient has a Health Care Directive on file  Advance care planning document is on file but is outdated.  Patient encouraged to update.      10/30/2024   General Health   How would you rate your overall physical health? Good   Feel stress (tense, anxious, or unable to sleep) Only a little      (!) STRESS CONCERN      10/30/2024   Nutrition   Diet: Regular (no restrictions)            10/30/2024   Exercise   Days per week of moderate/strenous exercise 0 days   Average minutes spent exercising at this level 30 min      (!) EXERCISE CONCERN      10/30/2024   Social Factors   Frequency of gathering with friends or relatives More than three times a week   Worry food won't last until get money to buy more No   Food not last or not have enough money for food? No   Do you have housing? (Housing is defined as stable permanent housing and does not include staying ouside in a car, in a tent, in an abandoned building, in an overnight shelter, or couch-surfing.) Yes   Are you worried about losing your housing? No   Lack of transportation? No   Unable to get utilities (heat,electricity)? No            11/3/2024   Fall Risk   Fallen 2 or more times in the past year? No    Trouble with walking or balance? No        Patient-reported          10/30/2024   Activities of Daily Living- Home Safety   Needs help with the following daily activites None of the above   Safety concerns in the home None of the above            10/30/2024   Dental   Dentist two times every year? Yes            10/30/2024   Hearing Screening   Hearing concerns? (!) IT'S HARD TO FOLLOW A CONVERSATION IN A NOISY RESTAURANT OR CROWDED ROOM.    (!) TROUBLE UNDERSTANDING SOFT OR WHISPERED SPEECH.       Multiple values from one day are sorted in reverse-chronological order         10/30/2024   Driving Risk Screening   Patient/family members have concerns about driving No            10/30/2024  "  General Alertness/Fatigue Screening   Have you been more tired than usual lately? (!) YES            10/30/2024   Urinary Incontinence Screening   Bothered by leaking urine in past 6 months No            10/30/2024   TB Screening   Were you born outside of the [place]? No            Today's PHQ-2 Score:       11/3/2024     2:59 PM   PHQ-2 ( 1999 [employer])   Q1: Little interest or pleasure in doing things 0    Q2: Feeling down, depressed or hopeless 0    PHQ-2 Score 0    Q1: Little interest or pleasure in doing things Not at all   Q2: Feeling down, depressed or hopeless Not at all   PHQ-2 Score 0       Patient-reported           10/30/2024   Substance Use   Alcohol more than 3/day or more than 7/wk No   Do you have a current opioid prescription? No   How severe/bad is pain from 1 to 10? 0/10 (No Pain)   Do you use any other substances recreationally? No        Social History     Tobacco Use    Smoking status: Former     Current packs/day: 0.50     Average packs/day: 0.5 packs/day for 3.0 years (1.5 ttl pk-yrs)     Types: Cigarettes    Smokeless tobacco: Never    Tobacco comments:     quit 1958   Vaping Use    Vaping status: Never Used   Substance Use Topics    Alcohol use: Yes     Alcohol/week: 7.0 - 14.0 standard drinks of alcohol     Types: 7 - 14 Glasses of wine per week     Comment: one \"glass\" of wine is about 2 drinks worth    Drug use: No          Mammogram Screening - After age 74- determine frequency with patient based on health status, life expectancy and patient goals          Reviewed and updated as needed this visit by Provider                      Current providers sharing in care for this patient include:  Patient Care Team:  Piper Jim MD as PCP - General (Internal Medicine)  Cathie Jones APRN CNP as Nurse Practitioner (Cardiovascular Disease)  Cathie Jones APRN CNP as Assigned Heart and Vascular Provider  Piper Jim MD as Assigned PCP    The following health maintenance items are " "reviewed in Epic and correct as of today:  Health Maintenance   Topic Date Due    INFLUENZA VACCINE (1) 09/01/2024    COVID-19 Vaccine (7 - 2024-25 season) 09/01/2024    ANNUAL REVIEW OF HM ORDERS  10/26/2024    BMP  11/01/2024    LIPID  11/01/2024    MEDICARE ANNUAL WELLNESS VISIT  10/26/2024    LEE ASSESSMENT  11/04/2025    FALL RISK ASSESSMENT  11/04/2025    ADVANCE CARE PLANNING  10/26/2028    DTAP/TDAP/TD IMMUNIZATION (4 - Td or Tdap) 04/23/2031    DEXA  07/29/2035    PHQ-2 (once per calendar year)  Completed    Pneumococcal Vaccine: 65+ Years  Completed    ZOSTER IMMUNIZATION  Completed    RSV VACCINE  Completed    HPV IMMUNIZATION  Aged Out    MENINGITIS IMMUNIZATION  Aged Out    RSV MONOCLONAL ANTIBODY  Aged Out         Review of Systems  Constitutional, HEENT, cardiovascular, pulmonary, gi and gu systems are negative, except as otherwise noted.     Objective    Exam  BP (!) 141/81 (BP Location: Right arm, Patient Position: Sitting, Cuff Size: Adult Small)   Pulse 74   Temp 97  F (36.1  C) (Oral)   Resp 18   Ht 1.638 m (5' 4.5\")   Wt 58.1 kg (128 lb)   LMP  (LMP Unknown)   SpO2 99%   BMI 21.63 kg/m     Estimated body mass index is 21.63 kg/m  as calculated from the following:    Height as of this encounter: 1.638 m (5' 4.5\").    Weight as of this encounter: 58.1 kg (128 lb).    Physical Exam  GENERAL: alert and no distress  EYES: Eyes grossly normal to inspection, PERRL and conjunctivae and sclerae normal  HENT: ear canals and TM's normal, nose and mouth without ulcers or lesions  RESP: lungs clear to auscultation - no rales, rhonchi or wheezes  CV: regular rate and rhythm, normal S1 S2  ABDOMEN: soft, nontender, no hepatosplenomegaly, no masses  MS: no gross musculoskeletal defects noted, no edema  NEURO: Normal strength and tone, mentation intact and speech normal  PSYCH: mentation appears normal, affect normal.        11/4/2024   Mini Cog   Clock Draw Score 0 Abnormal   3 Item Recall 2 objects " recalled   Mini Cog Total Score 2                 Signed Electronically by: Piper Jim MD    Answers submitted by the patient for this visit:  Patient Health Questionnaire (G7) (Submitted on 11/4/2024)  LEE 7 TOTAL SCORE: 0

## 2024-11-21 ENCOUNTER — OFFICE VISIT (OUTPATIENT)
Dept: CARDIOLOGY | Facility: CLINIC | Age: 89
End: 2024-11-21
Attending: NURSE PRACTITIONER
Payer: COMMERCIAL

## 2024-11-21 VITALS
BODY MASS INDEX: 21.65 KG/M2 | HEART RATE: 79 BPM | OXYGEN SATURATION: 97 % | HEIGHT: 64 IN | WEIGHT: 126.8 LBS | SYSTOLIC BLOOD PRESSURE: 121 MMHG | DIASTOLIC BLOOD PRESSURE: 70 MMHG

## 2024-11-21 DIAGNOSIS — I77.9 BILATERAL CAROTID ARTERY DISEASE, UNSPECIFIED TYPE (H): ICD-10-CM

## 2024-11-21 DIAGNOSIS — I10 BENIGN ESSENTIAL HYPERTENSION: ICD-10-CM

## 2024-11-21 DIAGNOSIS — I48.0 PAF (PAROXYSMAL ATRIAL FIBRILLATION) (H): Primary | ICD-10-CM

## 2024-11-21 DIAGNOSIS — E78.5 HYPERLIPIDEMIA LDL GOAL <70: ICD-10-CM

## 2024-11-21 NOTE — LETTER
11/21/2024    Piper Jim MD  [address] [facility] [address]    RE: Jennifer Castorenapamela       Dear Colleague,     I had the pleasure of seeing Jennifer Whiteside in the [facility].  CARDIOLOGY CLINIC NOTE    PRIMARY CARDIOLOGIST  Dr. Truong     PRIMARY CARE PHYSICIAN:  Piper Jim    HISTORY OF PRESENT ILLNESS:  Jennifer Whiteside  is a very pleasant 89-year-old female with a past medical history significant for paroxysmal atrial fibrillation on chronic anticoagulation, SVT, hypertension, hyperlipidemia, bilateral carotid artery disease status post endarterectomy, PVD and anxiety.     She returns to the office today for 6-month follow-up.  She continues to do extremely well on a cardiac standpoint, denies chest pain, shortness of breath, palpitations, PND, orthopnea, presyncope, syncope or lower extremity edema.  Her only concern is occasional difficulty with swallowing large pieces of meat, forcing her to vomit.  She is established with ENT.     Nuclear stress test in 2021 was negative for inducible myocardial ischemia or infarction.    Echocardiogram on 2022 showed a normal ejection fraction estimated at 60 to 65%, grade 1 diastolic dysfunction, mild tricuspid regurgitation and elevated RV systolic pressure consistent with mild pulmonary hypertension.     CTA head/neck -Mild/moderate atherosclerotic disease of the right carotid bifurcation and proximal internal carotid artery without hemodynamically significant narrowing by NASCET criteria. No significant atherosclerosis affecting the left carotid artery.     Blood pressure is well-controlled at 121/70  Recent labs showed an unremarkable CBC.  BMP showed a mildly reduced sodium level of 132, potassium 4.0, BUN 13.5, creatinine 0.71 and GFR 81  Lipid panel showed a total cholesterol 146, HDL 57, LDL 66 and triglycerides 114    She continues to be very socially active at her living facility.  Compliant with all  medications.    PAST MEDICAL HISTORY:  Past Medical History:   Diagnosis Date     Atrial fibrillation (H) 5/2012    with RVR.  Occurred POD #2 following carotid enarterectomy.     Depressive disorder      Elevated LFTs     occurred on lipitor; resolved off medications     Esophageal stenosis      Hyperlipidaemia      Hyperlipidemia      Irregular heart beat      New onset atrial fibrillation (H) 5/13/2012     Osteoarthrosis      PAD (peripheral artery disease) (H)      PVD (peripheral vascular disease) (H)     s/p carotid enarterectomy 5-10-12     SVT (supraventricular tachycardia) (H) 4/21/2020       MEDICATIONS:  Current Outpatient Medications   Medication Sig Dispense Refill     apixaban ANTICOAGULANT (ELIQUIS) 2.5 MG tablet Take 1 tablet (2.5 mg) by mouth 2 times daily 180 tablet 4     GARLIC PO Take 1 tablet by mouth daily        latanoprost (XALATAN) 0.005 % ophthalmic solution Place 1 drop into both eyes At Bedtime       [START ON 12/30/2024] metoprolol succinate ER (TOPROL XL) 25 MG 24 hr tablet Take 1 tablet (25 mg) by mouth every evening. 90 tablet 0     multivitamin w/minerals (MULTI-VITAMIN) tablet Take 1 tablet by mouth daily       omega-3 fatty acids 1200 MG capsule Take 1 capsule by mouth 2 times daily.       polyethylene glycol-propylene glycol (SYSTANE ULTRA) 0.4-0.3 % SOLN ophthalmic solution Place 1 drop into both eyes 2 times daily       [START ON 1/15/2025] pravastatin (PRAVACHOL) 40 MG tablet Take 1 tablet (40 mg) by mouth daily. 90 tablet 2     [START ON 1/20/2025] sertraline (ZOLOFT) 50 MG tablet Take 1 tablet (50 mg) by mouth daily. 90 tablet 0     timolol maleate (TIMOPTIC) 0.5 % ophthalmic solution Instill 1 drop Right Eye every morning*       Vitamin D, Cholecalciferol, 25 MCG (1000 UT) TABS Take 25 mcg by mouth daily        No current facility-administered medications for this visit.       SOCIAL HISTORY:  I have reviewed this patient's social history and updated it with pertinent  information if needed. Jennifer Whiteside  reports that she has quit smoking. Her smoking use included cigarettes. She has a 1.5 pack-year smoking history. She has never used smokeless tobacco. She reports current alcohol use of about 7.0 - 14.0 standard drinks of alcohol per week. She reports that she does not use drugs.    PHYSICAL EXAM:  Pulse:  [79] 79  BP: (121)/(70) 121/70  SpO2:  [97 %] 97 %  126 lbs 12.8 oz    Constitutional: alert, no distress  Respiratory: Good bilateral air entry  Cardiovascular: Giller rate and rhythm  GI: nondistended  Neuropsychiatric: appropriate affact    ASSESSMENT/PLAN:  Pertinent issues addressed/ reviewed during this cardiology visit  Paroxysmal atrial fibrillation -currently in regular rhythm, managed on low-dose metoprolol and Eliquis for stroke prevention.  Hypertension -well-controlled  Hyperlipidemia -LDL 66, on pravastatin  Carotid disease - .  Status post endarterectomy. CTA head/neck -Mild/moderate atherosclerotic disease of the right carotid bifurcation and proximal internal carotid artery. No significant atherosclerosis affecting the left carotid artery.     Follow-up in 1 year with TYRONE or sooner if needed.       It was a pleasure seeing this patient in clinic today. Please do not hesitate to contact me with any future questions.     PAVITHRA Charles, CNP  Cardiology - [facility]  11/21/2024       The level of medical decision making during this visit was of moderate complexity.    This note was completed in part using dictation via the Dragon voice recognition software. Some word and grammatical errors may occur and must be interpreted in the appropriate clinical context.  If there are any questions pertaining to this issue, please contact me for further clarification.      Thank you for allowing me to participate in the care of your patient.      Sincerely,     PAVITHRA Charles CNP     [facility]  Care  cc:   Cathie Jones, APRN CNP  [address] [place] [address]

## 2024-11-21 NOTE — PATIENT INSTRUCTIONS
Thanks for participating in a office visit with the [facility] clinic today.    Doing well on a cardiac standpoint   Reviewed results of recent labs - stable.   Continue current medical therapy.     Follow up in 1 year with TYRONE     Please call my nurse at   [phone]. Call with any questions or concerns.    Scheduling phone number: [phone]  Reminder: Please bring in all current medications, over the counter supplements and vitamin bottles to your next appointment.

## 2024-11-21 NOTE — PROGRESS NOTES
CARDIOLOGY CLINIC NOTE    PRIMARY CARDIOLOGIST  Dr. Truong     PRIMARY CARE PHYSICIAN:  Piper Jim    HISTORY OF PRESENT ILLNESS:  Jennifer Whiteside  is a very pleasant 89-year-old female with a past medical history significant for paroxysmal atrial fibrillation on chronic anticoagulation, SVT, hypertension, hyperlipidemia, bilateral carotid artery disease status post endarterectomy, PVD and anxiety.     She returns to the office today for 6-month follow-up.  She continues to do extremely well on a cardiac standpoint, denies chest pain, shortness of breath, palpitations, PND, orthopnea, presyncope, syncope or lower extremity edema.  Her only concern is occasional difficulty with swallowing large pieces of meat, forcing her to vomit.  She is established with ENT.     Nuclear stress test in 2021 was negative for inducible myocardial ischemia or infarction.    Echocardiogram on 2022 showed a normal ejection fraction estimated at 60 to 65%, grade 1 diastolic dysfunction, mild tricuspid regurgitation and elevated RV systolic pressure consistent with mild pulmonary hypertension.     CTA head/neck -Mild/moderate atherosclerotic disease of the right carotid bifurcation and proximal internal carotid artery without hemodynamically significant narrowing by NASCET criteria. No significant atherosclerosis affecting the left carotid artery.     Blood pressure is well-controlled at 121/70  Recent labs showed an unremarkable CBC.  BMP showed a mildly reduced sodium level of 132, potassium 4.0, BUN 13.5, creatinine 0.71 and GFR 81  Lipid panel showed a total cholesterol 146, HDL 57, LDL 66 and triglycerides 114    She continues to be very socially active at her living facility.  Compliant with all medications.    PAST MEDICAL HISTORY:  Past Medical History:   Diagnosis Date    Atrial fibrillation (H) 5/2012    with RVR.  Occurred POD #2 following carotid enarterectomy.    Depressive disorder     Elevated LFTs     occurred on  lipitor; resolved off medications    Esophageal stenosis     Hyperlipidaemia     Hyperlipidemia     Irregular heart beat     New onset atrial fibrillation (H) 5/13/2012    Osteoarthrosis     PAD (peripheral artery disease) (H)     PVD (peripheral vascular disease) (H)     s/p carotid enarterectomy 5-10-12    SVT (supraventricular tachycardia) (H) 4/21/2020       MEDICATIONS:  Current Outpatient Medications   Medication Sig Dispense Refill    apixaban ANTICOAGULANT (ELIQUIS) 2.5 MG tablet Take 1 tablet (2.5 mg) by mouth 2 times daily 180 tablet 4    GARLIC PO Take 1 tablet by mouth daily       latanoprost (XALATAN) 0.005 % ophthalmic solution Place 1 drop into both eyes At Bedtime      [START ON 12/30/2024] metoprolol succinate ER (TOPROL XL) 25 MG 24 hr tablet Take 1 tablet (25 mg) by mouth every evening. 90 tablet 0    multivitamin w/minerals (MULTI-VITAMIN) tablet Take 1 tablet by mouth daily      omega-3 fatty acids 1200 MG capsule Take 1 capsule by mouth 2 times daily.      polyethylene glycol-propylene glycol (SYSTANE ULTRA) 0.4-0.3 % SOLN ophthalmic solution Place 1 drop into both eyes 2 times daily      [START ON 1/15/2025] pravastatin (PRAVACHOL) 40 MG tablet Take 1 tablet (40 mg) by mouth daily. 90 tablet 2    [START ON 1/20/2025] sertraline (ZOLOFT) 50 MG tablet Take 1 tablet (50 mg) by mouth daily. 90 tablet 0    timolol maleate (TIMOPTIC) 0.5 % ophthalmic solution Instill 1 drop Right Eye every morning*      Vitamin D, Cholecalciferol, 25 MCG (1000 UT) TABS Take 25 mcg by mouth daily        No current facility-administered medications for this visit.       SOCIAL HISTORY:  I have reviewed this patient's social history and updated it with pertinent information if needed. Jennifer Burnettnichelle Whiteside  reports that she has quit smoking. Her smoking use included cigarettes. She has a 1.5 pack-year smoking history. She has never used smokeless tobacco. She reports current alcohol use of about 7.0 - 14.0 standard  drinks of alcohol per week. She reports that she does not use drugs.    PHYSICAL EXAM:  Pulse:  [79] 79  BP: (121)/(70) 121/70  SpO2:  [97 %] 97 %  126 lbs 12.8 oz    Constitutional: alert, no distress  Respiratory: Good bilateral air entry  Cardiovascular: Giller rate and rhythm  GI: nondistended  Neuropsychiatric: appropriate affact    ASSESSMENT/PLAN:  Pertinent issues addressed/ reviewed during this cardiology visit  Paroxysmal atrial fibrillation -currently in regular rhythm, managed on low-dose metoprolol and Eliquis for stroke prevention.  Hypertension -well-controlled  Hyperlipidemia -LDL 66, on pravastatin  Carotid disease - .  Status post endarterectomy. CTA head/neck -Mild/moderate atherosclerotic disease of the right carotid bifurcation and proximal internal carotid artery. No significant atherosclerosis affecting the left carotid artery.     Follow-up in 1 year with TYRONE or sooner if needed.       It was a pleasure seeing this patient in clinic today. Please do not hesitate to contact me with any future questions.     PAVITHRA Charles, CNP  Cardiology - [facility]  11/21/2024       The level of medical decision making during this visit was of moderate complexity.    This note was completed in part using dictation via the Dragon voice recognition software. Some word and grammatical errors may occur and must be interpreted in the appropriate clinical context.  If there are any questions pertaining to this issue, please contact me for further clarification.

## 2024-12-05 ENCOUNTER — HOSPITAL ENCOUNTER (EMERGENCY)
Facility: CLINIC | Age: 89
Discharge: HOME | End: 2024-12-05
Attending: EMERGENCY MEDICINE
Payer: COMMERCIAL

## 2024-12-05 VITALS
BODY MASS INDEX: 22.95 KG/M2 | WEIGHT: 131.61 LBS | HEART RATE: 78 BPM | DIASTOLIC BLOOD PRESSURE: 83 MMHG | TEMPERATURE: 97.6 F | SYSTOLIC BLOOD PRESSURE: 129 MMHG | OXYGEN SATURATION: 96 % | RESPIRATION RATE: 16 BRPM

## 2024-12-05 DIAGNOSIS — J04.0 LARYNGITIS: ICD-10-CM

## 2024-12-05 LAB
FLUAV RNA SPEC QL NAA+PROBE: NEGATIVE
FLUBV RNA RESP QL NAA+PROBE: NEGATIVE
GROUP A STREP BY PCR: NOT DETECTED
RSV RNA SPEC NAA+PROBE: NEGATIVE
SARS-COV-2 RNA RESP QL NAA+PROBE: NEGATIVE

## 2024-12-05 PROCEDURE — 87651 STREP A DNA AMP PROBE: CPT | Performed by: EMERGENCY MEDICINE

## 2024-12-05 PROCEDURE — 87637 SARSCOV2&INF A&B&RSV AMP PRB: CPT | Performed by: EMERGENCY MEDICINE

## 2024-12-05 RX ORDER — ACETAMINOPHEN 325 MG/1
975 TABLET ORAL ONCE
Status: COMPLETED | OUTPATIENT
Start: 2024-12-05 | End: 2024-12-05

## 2024-12-05 RX ADMIN — Medication 10 MG: at 10:59

## 2024-12-05 ASSESSMENT — COLUMBIA-SUICIDE SEVERITY RATING SCALE - C-SSRS
1. IN THE PAST MONTH, HAVE YOU WISHED YOU WERE DEAD OR WISHED YOU COULD GO TO SLEEP AND NOT WAKE UP?: NO
2. HAVE YOU ACTUALLY HAD ANY THOUGHTS OF KILLING YOURSELF IN THE PAST MONTH?: NO
6. HAVE YOU EVER DONE ANYTHING, STARTED TO DO ANYTHING, OR PREPARED TO DO ANYTHING TO END YOUR LIFE?: NO

## 2024-12-05 ASSESSMENT — ACTIVITIES OF DAILY LIVING (ADL)
ADLS_ACUITY_SCORE: 53
ADLS_ACUITY_SCORE: 53

## 2024-12-05 NOTE — ED TRIAGE NOTES
"Pt here with c/o cough, \"tickle in throat\" x 1 week. No fevers, CP, SOB or n/v. ABC intact.         "

## 2024-12-05 NOTE — ED PROVIDER NOTES
"  Emergency Department Note      History of Present Illness     Chief Complaint   Cough and Pharyngitis    HPI   Jennifer Whiteside is an 89 year old female with history of hyperlipidemia and atrial fibrillation anticoagulated on Eliquis who presents for a cough. Patient reports that she has had \"tickles\" in the back of her throat for the past couple of weeks. She has a non-productive cough and sore throat. It is sometimes hard to swallow, especially when she does not chew meat thoroughly. She has been occasionally short of breath, but not right now. The symptoms are getting worse and she is now losing her voice. No fever or chills. Patient has chronic dizziness and has been using a walker to ambulate recently.     Independent Historian   None    Review of External Notes   None     Past Medical History     Medical History and Problem List   Atrial fibrillation  Depressive disorder  Esophageal stenosis  Hyperlipidemia  Osteoarthritis  PAD  PVD  SVT     Medications   Eliquis   Zoloft  Pravastatin   Ativan   Fosamax     Surgical History   Appendectomy  Closed reduction of right wrist   Carotid endarterectomy   Tonsillectomy and adenoidectomy   Right rotator cuff repair  Left rotator cuff repair  Vaginal hysterectomy   Right foot hammer toe repair   Lumbar foramot/dskc    Physical Exam     Patient Vitals for the past 24 hrs:   BP Temp Temp src Pulse Resp SpO2 Weight   12/05/24 1145 129/83 -- -- 78 16 96 % --   12/05/24 0841 128/75 97.6  F (36.4  C) Temporal 90 18 95 % 59.7 kg (131 lb 9.8 oz)     Physical Exam  General: Sitting on the ED chair  HEENT: Normocephalic, atraumatic, posterior oropharyngeal injection, uvula midline, no unilateral tonsillar swelling, 1 tender mobile anterior chain lymph node on the left  Cardiac: Warm and well perfused, regular rate and rhythm  Pulm: Breathing comfortably, no accessory muscle usage, no conversational dyspnea, and lungs clear bilaterally  MSK: No bony deformities  Skin: " Warm and dry  Neuro: Awake and alert  Psych: Pleasantmood and affect     Diagnostics     Lab Results   Labs Ordered and Resulted from Time of ED Arrival to Time of ED Departure   INFLUENZA A/B, RSV AND SARS-COV2 PCR - Normal       Result Value    Influenza A PCR Negative      Influenza B PCR Negative      RSV PCR Negative      SARS CoV2 PCR Negative     GROUP A STREPTOCOCCUS PCR THROAT SWAB - Normal    Group A strep by PCR Not Detected       Imaging   XR Chest 2 Views   Final Result   IMPRESSION: Cardiopericardial silhouette is within normal limits.   Aortic arch calcification. No focal airspace consolidation. No pleural   effusion. No discernible pneumothorax. Dextroscoliotic curvature of   the thoracic spine and partially imaged lumbar levoscoliosis.      JUAN PABLO SAUL MD            SYSTEM ID:  [id number]        Independent Interpretation   CXR: No infiltrate.    ED Course      Medications Administered   Medications   dexAMETHasone (DECADRON) alcohol-free oral solution 10 mg (10 mg Oral $Given 12/5/24 1059)   acetaminophen (TYLENOL) tablet 975 mg (975 mg Oral Not Given 12/5/24 1101)     Discussion of Management   None    ED Course   ED Course as of 12/05/24 1151   Thu Dec 05, 2024   1048 I evaluated the patient, obtained history, and performed a physical exam as detailed above.    1148 I rechecked on the patient and explained the plan for discharge. They are comfortable with this plan.      Additional Documentation  None    Medical Decision Making / Diagnosis     CMS Diagnoses: None    MIPS   None    MDM   Jennifer Whiteside is a 89 year old female who presents with sore throat and cough.  Vital signs are stable, no respiratory distress, no stridor, no evident airway emergency.  There is some posterior pharyngeal injection that corresponds with the patient's symptoms, thankfully no asymmetric findings to suggest PTA.  I do not think that imaging of the neck is warranted based on exam.  Strep and viral  multiplex are negative.  The patient was given a dose of dexamethasone.  Chest x-ray shows no evidence of pneumonia.  Upon reevaluation, symptoms have improved a bit after the dexamethasone, patient decided not to take the Tylenol because her pain was not that bad.  Overall picture does seem consistent with a laryngitis, no antibiotics warranted at this time.  This coincides with both the patient's sore throat as well as loss of voice.  I have a lower suspicion for a neurologic etiology based on the picture above.  Plan is to discharge home with recommendation for primary care follow-up.  The patient was also concerned about intermittent trouble with food getting stuck when she swallows.  I recommended that she discuss this further with her primary care provider as well, could consider GI evaluation if there is concern for an esophageal structural abnormality.    Disposition   The patient was discharged.     Diagnosis     ICD-10-CM    1. Laryngitis  J04.0            Scribe Disclosure:  Elizabeth CASTRO, am serving as a scribe at 10:44 AM on 12/5/2024 to document services personally performed by Daniel Madrid MD based on my observations and the provider's statements to me.        Daniel Madrid MD  12/05/24 9552

## 2024-12-09 ENCOUNTER — OFFICE VISIT (OUTPATIENT)
Dept: INTERNAL MEDICINE | Facility: CLINIC | Age: 89
End: 2024-12-09
Payer: COMMERCIAL

## 2024-12-09 VITALS
BODY MASS INDEX: 22.11 KG/M2 | HEART RATE: 86 BPM | RESPIRATION RATE: 20 BRPM | DIASTOLIC BLOOD PRESSURE: 82 MMHG | TEMPERATURE: 97.4 F | WEIGHT: 126.8 LBS | SYSTOLIC BLOOD PRESSURE: 135 MMHG | OXYGEN SATURATION: 97 %

## 2024-12-09 DIAGNOSIS — Z09 FOLLOW-UP EXAMINATION: Primary | ICD-10-CM

## 2024-12-09 DIAGNOSIS — R13.10 DYSPHAGIA, UNSPECIFIED TYPE: ICD-10-CM

## 2024-12-09 DIAGNOSIS — K59.01 SLOW TRANSIT CONSTIPATION: ICD-10-CM

## 2024-12-09 DIAGNOSIS — R42 LIGHTHEADEDNESS: ICD-10-CM

## 2024-12-09 LAB
ANION GAP SERPL CALCULATED.3IONS-SCNC: 10 MMOL/L (ref 7–15)
BASOPHILS # BLD AUTO: 0.1 10E3/UL (ref 0–0.2)
BASOPHILS NFR BLD AUTO: 1 %
BUN SERPL-MCNC: 14.3 MG/DL (ref 8–23)
CALCIUM SERPL-MCNC: 9.6 MG/DL (ref 8.8–10.4)
CHLORIDE SERPL-SCNC: 100 MMOL/L (ref 98–107)
CREAT SERPL-MCNC: 0.69 MG/DL (ref 0.51–0.95)
EGFRCR SERPLBLD CKD-EPI 2021: 83 ML/MIN/1.73M2
EOSINOPHIL # BLD AUTO: 0 10E3/UL (ref 0–0.7)
EOSINOPHIL NFR BLD AUTO: 0 %
ERYTHROCYTE [DISTWIDTH] IN BLOOD BY AUTOMATED COUNT: 11.9 % (ref 10–15)
GLUCOSE SERPL-MCNC: 97 MG/DL (ref 70–99)
HCO3 SERPL-SCNC: 28 MMOL/L (ref 22–29)
HCT VFR BLD AUTO: 35.3 % (ref 35–47)
HGB BLD-MCNC: 11.8 G/DL (ref 11.7–15.7)
IMM GRANULOCYTES # BLD: 0.1 10E3/UL
IMM GRANULOCYTES NFR BLD: 1 %
LYMPHOCYTES # BLD AUTO: 2.2 10E3/UL (ref 0.8–5.3)
LYMPHOCYTES NFR BLD AUTO: 22 %
MCH RBC QN AUTO: 33.3 PG (ref 26.5–33)
MCHC RBC AUTO-ENTMCNC: 33.4 G/DL (ref 31.5–36.5)
MCV RBC AUTO: 100 FL (ref 78–100)
MONOCYTES # BLD AUTO: 1 10E3/UL (ref 0–1.3)
MONOCYTES NFR BLD AUTO: 10 %
NEUTROPHILS # BLD AUTO: 6.8 10E3/UL (ref 1.6–8.3)
NEUTROPHILS NFR BLD AUTO: 67 %
PLATELET # BLD AUTO: 260 10E3/UL (ref 150–450)
POTASSIUM SERPL-SCNC: 4.7 MMOL/L (ref 3.4–5.3)
RBC # BLD AUTO: 3.54 10E6/UL (ref 3.8–5.2)
SODIUM SERPL-SCNC: 138 MMOL/L (ref 135–145)
TSH SERPL DL<=0.005 MIU/L-ACNC: 2.82 UIU/ML (ref 0.3–4.2)
WBC # BLD AUTO: 10.1 10E3/UL (ref 4–11)

## 2024-12-09 PROCEDURE — 99214 OFFICE O/P EST MOD 30 MIN: CPT

## 2024-12-09 PROCEDURE — 80048 BASIC METABOLIC PNL TOTAL CA: CPT

## 2024-12-09 PROCEDURE — 84443 ASSAY THYROID STIM HORMONE: CPT

## 2024-12-09 PROCEDURE — 85025 COMPLETE CBC W/AUTO DIFF WBC: CPT

## 2024-12-09 PROCEDURE — 36415 COLL VENOUS BLD VENIPUNCTURE: CPT

## 2024-12-09 RX ORDER — SENNA AND DOCUSATE SODIUM 50; 8.6 MG/1; MG/1
1 TABLET, FILM COATED ORAL AT BEDTIME
Qty: 90 TABLET | Refills: 3 | Status: SHIPPED | OUTPATIENT
Start: 2024-12-09

## 2024-12-09 ASSESSMENT — PAIN SCALES - GENERAL: PAINLEVEL_OUTOF10: NO PAIN (0)

## 2024-12-09 NOTE — PROGRESS NOTES
Assessment & Plan     Follow-up examination  This is a follow-up to an ED visit on 12/5/2024.  Patient presents for a cough and a tickle in the back of her throat for the past couple weeks, nonproductive cough and sore throat and dysphagia with chewing meat.  Patient reports occasional SOB and laryngitis, and chronic dizziness.     Dysphagia, unspecified type  Pt reports that she has experienced dysphagia where the food gets stuck for almost a year where she feels like the food gets stuck and makes herself throw up to get the offensive obstruction out.   - Adult GI  Referral - Procedure Only; Future  - TSH with free T4 reflex; Future  - TSH with free T4 reflex  - Speech Therapy  Referral; Future    Lightheadedness  Pt still reports dizziness and feeling off balance and uses her walker for balance. Dizziness has become more intense in the last 3-4 months. It is more prominent in the morning. Patient denies having a spinning sensation and denies any diplopia through the 6 cardinal fields of gaze.  - Basic metabolic panel  (Ca, Cl, CO2, Creat, Gluc, K, Na, BUN); Future  - CBC with platelets and differential; Future  - Basic metabolic panel  (Ca, Cl, CO2, Creat, Gluc, K, Na, BUN)  - CBC with platelets and differential    Slow transit constipation  Pt reports that she has a chronic problem with constipation and needs to actually digitally extract her stool which is hard, patient has hypoactive bowel sounds x 4  - SENNA-docusate sodium (SENNA S) 8.6-50 MG tablet; Take 1 tablet by mouth at bedtime.        MED REC REQUIRED  Post Medication Reconciliation Status:       MEDICATIONS:   Orders Placed This Encounter   Medications    SENNA-docusate sodium (SENNA S) 8.6-50 MG tablet     Sig: Take 1 tablet by mouth at bedtime.     Dispense:  90 tablet     Refill:  3     Can increase to 1 tablet twice a day if one tablet at bedtime does not help you achieve the desired results.     There are no discontinued  medications.       - Continue other medications without change    Subjective   Keiko is a 89 year old, presenting for the following health issues: This is a follow-up to an ED visit on 12/5/2024.  Patient presents for a cough and a tickle in the back of her throat for the past couple weeks, nonproductive cough and sore throat and dysphagia with chewing meat.  Patient reports occasional SOB and laryngitis, and chronic dizziness.  Chest x-ray shows no infiltrate.  Strep and viral multiplex were negative.  Patient was given a dose of dexamethasone.  It was recommended for a GI evaluation due to food getting stuck when she swallows for a concern of esophageal structural abnormality.    Pt reports that she has experienced dysphagia where the food gets stuck for almost a year where she feels like the food gets stuck and makes herself throw up to get the offensive obstruction out. Pt feels like when she does not chew her food small enough like with mostly meat is when it happens the most. Pt also reports coughing every once in awhile when drinking fluids. Pt has not had a hx of aspiration pneumonia.     Pt still reports dizziness and feeling off balance and uses her walker for balance. Dizziness has become more intense in the last 3-4 months. It is more prominent in the morning. Patient denies having a spinning sensation and denies any diplopia through the 6 cardinal fields of gaze.   Patient reports that she drinks a lot of water and her last sodium level was 132.  Blood pressure today was 135/82 with a heart rate of 86.    Pt reports that she has a chronic problem with constipation and needs to actually digitally extract her stool which is hard.  Discussed with patient about taking senna S starting with 1 tablet at night and can have as much as 2 tablets twice a day.  Explained the patient to not take if she  has loose stools.  Hospital F/U        12/9/2024     9:08 AM   Additional Questions   Roomed by hope r    Accompanied by self         12/9/2024     9:08 AM   Patient Reported Additional Medications   Patient reports taking the following new medications fish oil, garlic     HPI               Review of Systems  Constitutional, HEENT, cardiovascular, pulmonary, gi and gu systems are negative, except as otherwise noted.      Objective    /82 (BP Location: Right arm, Patient Position: Sitting, Cuff Size: Adult Regular)   Pulse 86   Temp 97.4  F (36.3  C) (Oral)   Resp 20   Wt 57.5 kg (126 lb 12.8 oz)   LMP  (LMP Unknown)   SpO2 97%   Breastfeeding No   BMI 22.11 kg/m    Body mass index is 22.11 kg/m .  Physical Exam   GENERAL: alert and no distress  HENT: normal cephalic/atraumatic, right ear: normal: no effusions, no erythema, normal landmarks, left ear: normal: no effusions, no erythema, normal landmarks, nose and mouth without ulcers or lesions, oropharynx clear, oral mucous membranes moist, and tonsillar erythema  NECK: no adenopathy, no asymmetry, masses, or scars  RESP: lungs clear to auscultation - no rales, rhonchi or wheezes  CV: regular rate and rhythm, normal S1 S2, no S3 or S4, no murmur, click or rub, no peripheral edema  ABDOMEN: soft, nontender, without hepatosplenomegaly or masses and bowel sounds hypoactive x 4  MS: no gross musculoskeletal defects noted, no edema  SKIN: no suspicious lesions or rashes  NEURO: Normal strength and tone, sensory exam grossly normal, proprioception normal evidenced by negative pronator drift, mentation intact, oriented times 4, speech normal, gait normal including heel/toe/tandem walking, Romberg abnormal, and finger to nose is slightly off, no nystagmus or reports of diplopia through the 6 cardinal fields of gaze, equal shoulder shrug equal facial grimace, equal handgrip  PSYCH: mentation appears normal, affect normal/bright            Signed Electronically by: PAVITHRA Shook CNP

## 2024-12-10 ENCOUNTER — TELEPHONE (OUTPATIENT)
Dept: INTERNAL MEDICINE | Facility: CLINIC | Age: 89
End: 2024-12-10
Payer: COMMERCIAL

## 2024-12-10 NOTE — TELEPHONE ENCOUNTER
Patient Returning Call    Reason for call:  Patient said that she can't make it to speech therapy need a place that is closer to her because she don't drive that far if you have something that out of service that close the patient is willing to go    Information relayed to patient:      Patient has additional questions:  Yes    What are your questions/concerns:  above    Who does the patient want to speak with:      Is an  needed?:  No      Could we send this information to you in [facility] or would you prefer to receive a phone call?:  [phone]

## 2024-12-12 NOTE — TELEPHONE ENCOUNTER
Spoke with patient.  Has not scheduled the procedure yet (FEES for dysphagia) due to she has to drive to the [facility].  She does not drive that far.    Advised patient this is the only location thru [place] that does that procedure.  Patient thinks maybe her sister in law can take her.  She will schedule an appointment to have this procedure done.

## 2024-12-19 ENCOUNTER — TELEPHONE (OUTPATIENT)
Dept: GASTROENTEROLOGY | Facility: CLINIC | Age: 89
End: 2024-12-19
Payer: COMMERCIAL

## 2024-12-19 NOTE — TELEPHONE ENCOUNTER
Pre assessment completed for upcoming procedure.   (Please see previous telephone encounter notes for complete details)           Procedure details:    Arrival time and facility location reviewed.    Pre op exam needed? No.    Designated  policy reviewed. Instructed to have someone stay 6  hours post procedure.       Medication review:    Medications reviewed. Please see supporting documentation below. Holding recommendations discussed (if applicable).   Blood thinner/Anti-platelet medication(s):  Apixaban (Eliquis): Recommended HOLD 2 days before procedure.  Consult with your managing provider..      Prep for procedure:     Procedure prep instructions reviewed.        Any additional information needed:  N/A      Patient verbalized understanding and had no questions or concerns at this time.      Susana Camacho LPN  Endoscopy Procedure Pre Assessment   [phone] option 2

## 2024-12-19 NOTE — TELEPHONE ENCOUNTER
"Endoscopy Scheduling Screen    Have you had any respiratory illness or flu-like symptoms in the last 10 days?  No    What is your communication preference for Instructions and/or Bowel Prep?   MyChart    What insurance is in the chart?  Other:  [employer]     Ordering/Referring Provider:     DESTIN ENRIQUE       (If ordering provider performs procedure, schedule with ordering provider unless otherwise instructed. )    BMI: Estimated body mass index is 22.11 kg/m  as calculated from the following:    Height as of 11/21/24: 1.613 m (5' 3.5\").    Weight as of 12/9/24: 57.5 kg (126 lb 12.8 oz).     Sedation Ordered  moderate sedation.   If patient BMI > 50 do not schedule in [facility].    If patient BMI > 45 do not schedule at [facility].    Are you taking methadone or Suboxone?  NO, No RN review required.    Have you been diagnosed and are being treated for severe PTSD or severe anxiety?  NO, No RN review required.    Are you taking any prescription medications for pain 3 or more times per week?   NO, No RN review required.    Do you have a history of malignant hyperthermia?  No    (Females) Are you currently pregnant?   No     Have you been diagnosed or told you have pulmonary hypertension?   No    Do you have an LVAD?  No    Have you been told you have moderate to severe sleep apnea?  No.    Have you been told you have COPD, asthma, or any other lung disease?  No    Do you have any heart conditions?  Yes     In the past year, have you had any hospitalizations for heart related issues including cardiomyopathy, heart attack, or stent placement?  No    Do you have any implantable devices in your body (pacemaker, ICD)?  No    Do you take nitroglycerine?  No    Have you ever had or are you waiting for an organ transplant?  No. Continue scheduling, no site restrictions.    Have you had a stroke or transient ischemic attack (TIA aka \"mini stroke\" in the last 6 months?   No    Have you been diagnosed with or been told you have cirrhosis " "of the liver?   No.    Are you currently on dialysis?   No    Do you need assistance transferring?   No    BMI: Estimated body mass index is 22.11 kg/m  as calculated from the following:    Height as of 11/21/24: 1.613 m (5' 3.5\").    Weight as of 12/9/24: 57.5 kg (126 lb 12.8 oz).     Is patients BMI > 40 and scheduling location [facility]?  No    Do you take an injectable or oral medication for weight loss or diabetes (excluding insulin)?  No    Do you take the medication Naltrexone?  No    Do you take blood thinners?  Yes, you must contact your prescribing provider for directions on holding or bridging with a different medication.       Prep   Are you currently on dialysis or do you have chronic kidney disease?  No    Do you have a diagnosis of diabetes?  No    Do you have a diagnosis of cystic fibrosis (CF)?  No    On a regular basis do you go 3 -5 days between bowel movements?  Yes (Extended Prep)    BMI > 40?  No    Preferred Pharmacy:    [id number] - [facility] [address]   [address] [facility] [address]  Phone: [phone] Fax: [phone]      Final Scheduling Details     Procedure scheduled  Upper endoscopy (EGD)    Surgeon:  GRACIE      Date of procedure:  1/02/2025    Pre-OP / PAC:   No - Not required for this site.    Location  [facility] - Per order.    Sedation   Moderate Sedation - Per order.      Patient Reminders:   You will receive a call from a Nurse to review instructions and health history.  This assessment must be completed prior to your procedure.  Failure to complete the Nurse assessment may result in the procedure being cancelled.      On the day of your procedure, please designate an adult(s) who can drive you home stay with you for the next 24 hours. The medicines used in the exam will make you sleepy. You will not be able to drive.      You cannot take public transportation, ride share services, or non-medical taxi service without a responsible caregiver.  Medical transport " services are allowed with the requirement that a responsible caregiver will receive you at your destination.  We require that drivers and caregivers are confirmed prior to your procedure.

## 2024-12-19 NOTE — TELEPHONE ENCOUNTER
Pre visit planning completed.      Procedure details:    Patient scheduled for Upper endoscopy (EGD) on 1/2/25.     Arrival time: 0900. Procedure time 0945    Facility location: [facility]; [address] [place] [address]. Check in location: Main entrance, door #1 on the North side of the building under roundabout awning. DO NOT GO TO SURGERY/ED ENTRANCE.     Sedation type: Conscious sedation     Pre op exam needed? No.    Indication for procedure: dysphagia      Chart review:     Electronic implanted devices? No    Recent diagnosis of diverticulitis within the last 6 weeks? N/A      Medication review:    Diabetic? No    Anticoagulants? Yes Apixaban (Eliquis): Recommended HOLD 2 days before procedure.  Consult with your managing provider.    Weight loss medication/injectable? No.    Other medication HOLDING recommendations:  N/A      Prep for procedure:     Bowel prep recommendation: N/A      Procedure information and instructions sent via [employer]         Eneida Sidhu RN  Endoscopy Procedure Pre Assessment   [phone] option 2

## 2024-12-23 ENCOUNTER — THERAPY VISIT (OUTPATIENT)
Dept: SPEECH THERAPY | Facility: CLINIC | Age: 89
End: 2024-12-23
Payer: COMMERCIAL

## 2024-12-23 DIAGNOSIS — R13.10 DYSPHAGIA, UNSPECIFIED TYPE: ICD-10-CM

## 2024-12-23 NOTE — PROGRESS NOTES
SPEECH LANGUAGE PATHOLOGY EVALUATION              Subjective        Presenting condition or subjective complaint: (Patient-Rptd) food getting caught in my throat  Date of onset:  (reports onset of over a year ago)    Relevant medical history:     Past Medical History:   Diagnosis Date    Atrial fibrillation (H) 5/2012    with RVR.  Occurred POD #2 following carotid enarterectomy.    Depressive disorder     Elevated LFTs     occurred on lipitor; resolved off medications    Esophageal stenosis     Hyperlipidaemia     Hyperlipidemia     Irregular heart beat     New onset atrial fibrillation (H) 5/13/2012    Osteoarthrosis     PAD (peripheral artery disease) (H)     PVD (peripheral vascular disease) (H)     s/p carotid enarterectomy 5-10-12    SVT (supraventricular tachycardia) (H) 4/21/2020     Dates & types of surgery:    Past Surgical History:   Procedure Laterality Date    APPENDECTOMY      appy as a child    CLOSED REDUCTION WRIST Right 1/20/2016    Procedure: CLOSED REDUCTION WRIST;  Surgeon: Mina Brand MD;  Location:  OR    COLONOSCOPY  8/6/2013    Procedure: COMBINED COLONOSCOPY, SINGLE BIOPSY/POLYPECTOMY BY BIOPSY;  COLONOSCOPY with polypectomy using cold forceps.;  Surgeon: Madeline Oviedo MD;  Location:  GI    COLONOSCOPY  8/29/2016    Dr. Oviedo [facility]    COLONOSCOPY N/A 8/29/2016    Procedure: COMBINED COLONOSCOPY, SINGLE OR MULTIPLE BIOPSY/POLYPECTOMY BY BIOPSY;  Surgeon: Madeline Oviedo MD;  Location:  GI    ENDARTERECTOMY CAROTID  5/10/2012    LEFT, WITH EEG ; Surgeon:SELINA HANKS; Location: OR    ENT SURGERY      T&A as a child    HC CORRECT BUNION,SIMPLE      Right    HC REPAIR ROTATOR CUFF,ACUTE  2000    Right    HC REPAIR ROTATOR CUFF,ACUTE  2002    Left    HYSTERECTOMY, VAGINAL      simple     rt foot hammer toe repair  2005    ZZC HILLIARD W/O FACETEC FORAMOT/[facility] 1/2 VRT SEG, LUMBAR      L4     Prior diagnostic imaging/testing results:   Esophagram 1/21/2019:     IMPRESSION: No  "evidence of esophageal mass or stricture. Tertiary  waves are present, compatible with presbyesophagus.    Prior therapy history for the same diagnosis, illness or injury: (Patient-Rptd) No      Living Environment  Social support: (Patient-Rptd) Alone   Help at home: (Patient-Rptd) Self Cares (home health aide/personal care attendant, family, etc)  Equipment owned: (Patient-Rptd) Walker with wheels     Employment:      Hobbies/Interests: (Patient-Rptd) interactions with my friends here    Patient goals for therapy: (Patient-Rptd) not have a tickle in the back of my throat    Pain assessment: Pain denied     Objective     SWALLOW EVALUTION  Dysphagia history: Pt reports over a year of food getting stuck in her chest. She reports then she has to excuse herself from the table and go force herself to vomit to clear it. Denies food getting stuck in her throat and coughing/throat clearing with PO intake. Denies difficulty swallowing pills. Reports she often feels \"fluid build up\" in her throat that causes her to cough.     Current Diet/Method of Nutritional Intake: thin liquids (level 0), regular diet      Patient Supplied Answers To EAT Questionnaire      12/18/2024     9:07 AM   Eating Assessment Tool (EAT-10)   My swallowing problem has caused me to lose weight 0   My swallowing problem interferes with my ability to go out for meals 0   Swallowing solids takes extra effort 1   Swallowing pills takes extra effort 1   Swallowing is painful 0   The pleasure of eating is affected by my swallowing 0   When I swallow food sticks in my throat 0   I cough when I eat 0   Swallowing is stressful 0   EAT-10 2        Patient-reported       CLINICAL SWALLOW EVALUATION  Oral Motor Function:   Dentition: adequate dentition  Secretion management: pt reports feeling fluid build up in her throat  Mucosal quality: adequate  Mandibular function: intact  Oral labial function: WFL  Lingual function: WFL  Velar function: intact   Buccal " function: intact  Laryngeal function: cough, throat clear, volitional swallow, impaired vocal quality: roughness      Level of assist required for feeding: no assistance needed   Textures Trialed:   Clinical Swallow Eval: Thin Liquids  Mode of presentation: cup, self-fed   Volume presented: 3oz  Preparatory Phase: WFL  Oral Phase: WFL  Pharyngeal phase of swallow: intact   Strategies trialed during procedure: n/a   Diagnostic statement: x1 repeated swallow otherwise no clinical s/sx of penetration/aspiration.      ADDITIONAL EVAL COMPLETED TODAY : yes; rationale: to further assess pharyngeal phase    FIBEROPTIC ENDOSCOPIC EVALUATION OF SWALLOWING (FEES)  Type of Scope: adult   Scope Serial Number: [id number]  Nares Entry/Passage: nostril entered using no topical anesthetic, scope passed through right nares   Anatomical Findings:  WFL    Velar Elevation: WFL   Lateral Pharyngeal Wall Contraction: WFL  Secretions at onset of evaluation: none   FEES Textures Trialed:   FEES Eval: Thin Liquids  Mode of Presentation: straw   Pre-Spillage: none  Epiglottic Inversion: yes  Initiation of Pharyngeal Swallow: timely  Base of Tongue Retraction: complete  Pharyngeal Constriction: complete  Penetration: not observed  Aspiration: not observed  Patient Response to Aspiration: not applicable  Post swallow residuals:  intermittent in piriforms; cleared with secondary, dry swallow    FEES Eval: Purees  Mode of Presentation: spoon   Pre-Spillage: none  Epiglottic Inversion: yes  Initiation of Pharyngeal Swallow: timely  Base of Tongue Retraction: complete  Pharyngeal Constriction: complete  Penetration: not observed  Aspiration: not observed  Patient Response to Aspiration: not applicable  Post swallow residuals: valleculae, minimal      FEES Eval: Solids  Mode of Presentation: self-fed   Pre-Spillage: to valleculae  Epiglottic Inversion: yes  Initiation of Pharyngeal Swallow: timely  Base of Tongue Retraction: reduced  Pharyngeal  Constriction: complete  Penetration: not observed  Aspiration: not observed  Patient Response to Aspiration: not applicable  Post swallow residuals: valleculae, cricopharyngeal area, cleared with sip of liquid      ESOPHAGEAL PHASE OF SWALLOW  patient reports symptoms of esophageal dysphagia     SWALLOW ASSESSMENT CLINICAL IMPRESSIONS AND RATIONALE  Diet Consistency Recommendations: thin liquids (level 0), regular diet    Recommended Feeding/Eating Techniques: small bolus size, slow rate of intake, alternate food and liquid intake, monitor for cough or change in vocal quality with intake, maintain upright sitting position for eating, maintain upright posture during/after eating for 30 minutes, minimize distractions during oral intake   Medication Administration Recommendations: whole with thin liquid   Instrumental Assessment Recommendations: instrumental evaluation not recommended at this time     Assessment & Plan   CLINICAL IMPRESSIONS   Medical Diagnosis: Dysphagia, unspecified    Treatment Diagnosis: Minimal oropharyngeal dysphagia   Impression/Assessment: Pt is a 89 year old female with swallow complaints. The following significant findings have been identified:  minimally, impaired swallowing , characterized by post cricoid residual with solid that is cleared with liquid wash. Question if PE segment is tight/narrow. Should EGD not reveal potential causes of patient's dysphagia, could consider video swallow study and esophagram with foods from home. Trained pt on general safe swallow strategies.     PLAN OF CARE  Evaluation only    Recommended Referrals to Other Professionals:  recommend continued planned EGD next week; consider esophageal motility testing    Education Assessment:   Learner/Method: Patient;Family;Listening;Pictures/Video;No Barriers to Learning  Education Comments: Trained pt on anatomy/physiology of swallowing, results of today's study and diet recommendations    Risks and benefits of  evaluation/treatment have been explained.   Patient/Family/caregiver agrees with Plan of Care.     Evaluation Time:    SLP Eval: Flexible Fiberoptic Endoscopic Evaluation of Swallowing by Cine or Video Recording Minutes (11417): 16  SLP Eval: oral/pharyngeal swallow function, clinical minutes (37746): 35    Evaluation Only     Signing Clinician: JORJE Betancur

## 2024-12-24 ENCOUNTER — TELEPHONE (OUTPATIENT)
Dept: CARDIOLOGY | Facility: CLINIC | Age: 89
End: 2024-12-24
Payer: COMMERCIAL

## 2024-12-24 NOTE — TELEPHONE ENCOUNTER
Patient states she spoke with the Surgeon who is performing the EGD and he would like to have Eliquis held for 3 days.  Patient would like to hold for 3 days.  Edwige Gee RN, [phone]      Per Cathie  Can we please inform patient she will need to hold Eliquis 48 hrs prior to EGD. Resume Eliquis post procedure or when bleeding risk is low at the discretion of physician performing procedure.

## 2024-12-30 DIAGNOSIS — I10 BENIGN ESSENTIAL HYPERTENSION: ICD-10-CM

## 2024-12-30 DIAGNOSIS — I48.0 PAROXYSMAL ATRIAL FIBRILLATION (H): ICD-10-CM

## 2024-12-30 RX ORDER — METOPROLOL SUCCINATE 25 MG/1
25 TABLET, EXTENDED RELEASE ORAL EVERY EVENING
Qty: 90 TABLET | Refills: 0 | OUTPATIENT
Start: 2024-12-30

## 2025-01-02 ENCOUNTER — HOSPITAL ENCOUNTER (OUTPATIENT)
Facility: CLINIC | Age: 89
Discharge: HOME | End: 2025-01-02
Attending: INTERNAL MEDICINE | Admitting: INTERNAL MEDICINE
Payer: COMMERCIAL

## 2025-01-02 VITALS
SYSTOLIC BLOOD PRESSURE: 154 MMHG | DIASTOLIC BLOOD PRESSURE: 76 MMHG | HEIGHT: 63 IN | WEIGHT: 126 LBS | RESPIRATION RATE: 14 BRPM | BODY MASS INDEX: 22.32 KG/M2 | HEART RATE: 76 BPM | OXYGEN SATURATION: 97 %

## 2025-01-02 LAB — UPPER GI ENDOSCOPY: NORMAL

## 2025-01-02 PROCEDURE — 43239 EGD BIOPSY SINGLE/MULTIPLE: CPT | Performed by: INTERNAL MEDICINE

## 2025-01-02 PROCEDURE — 250N000009 HC RX 250: Performed by: INTERNAL MEDICINE

## 2025-01-02 PROCEDURE — 88305 TISSUE EXAM BY PATHOLOGIST: CPT | Mod: TC | Performed by: INTERNAL MEDICINE

## 2025-01-02 PROCEDURE — 999N000099 HC STATISTIC MODERATE SEDATION < 10 MIN: Performed by: INTERNAL MEDICINE

## 2025-01-02 PROCEDURE — 250N000011 HC RX IP 250 OP 636: Performed by: INTERNAL MEDICINE

## 2025-01-02 RX ORDER — EPINEPHRINE 1 MG/ML
0.1 INJECTION, SOLUTION, CONCENTRATE INTRAVENOUS
Status: DISCONTINUED | OUTPATIENT
Start: 2025-01-02 | End: 2025-01-02 | Stop reason: HOSPADM

## 2025-01-02 RX ORDER — ONDANSETRON 2 MG/ML
4 INJECTION INTRAMUSCULAR; INTRAVENOUS
Status: DISCONTINUED | OUTPATIENT
Start: 2025-01-02 | End: 2025-01-02 | Stop reason: HOSPADM

## 2025-01-02 RX ORDER — FENTANYL CITRATE 50 UG/ML
50-100 INJECTION, SOLUTION INTRAMUSCULAR; INTRAVENOUS EVERY 5 MIN PRN
Status: DISCONTINUED | OUTPATIENT
Start: 2025-01-02 | End: 2025-01-02 | Stop reason: HOSPADM

## 2025-01-02 RX ORDER — NALOXONE HYDROCHLORIDE 0.4 MG/ML
0.4 INJECTION, SOLUTION INTRAMUSCULAR; INTRAVENOUS; SUBCUTANEOUS
Status: DISCONTINUED | OUTPATIENT
Start: 2025-01-02 | End: 2025-01-02 | Stop reason: HOSPADM

## 2025-01-02 RX ORDER — ATROPINE SULFATE 0.1 MG/ML
1 INJECTION INTRAVENOUS
Status: DISCONTINUED | OUTPATIENT
Start: 2025-01-02 | End: 2025-01-02 | Stop reason: HOSPADM

## 2025-01-02 RX ORDER — NALOXONE HYDROCHLORIDE 0.4 MG/ML
0.2 INJECTION, SOLUTION INTRAMUSCULAR; INTRAVENOUS; SUBCUTANEOUS
Status: DISCONTINUED | OUTPATIENT
Start: 2025-01-02 | End: 2025-01-02 | Stop reason: HOSPADM

## 2025-01-02 RX ORDER — SIMETHICONE 40MG/0.6ML
133 SUSPENSION, DROPS(FINAL DOSAGE FORM)(ML) ORAL
Status: DISCONTINUED | OUTPATIENT
Start: 2025-01-02 | End: 2025-01-02 | Stop reason: HOSPADM

## 2025-01-02 RX ORDER — FLUMAZENIL 0.1 MG/ML
0.2 INJECTION, SOLUTION INTRAVENOUS
Status: DISCONTINUED | OUTPATIENT
Start: 2025-01-02 | End: 2025-01-02 | Stop reason: HOSPADM

## 2025-01-02 RX ORDER — LIDOCAINE 40 MG/G
CREAM TOPICAL
Status: DISCONTINUED | OUTPATIENT
Start: 2025-01-02 | End: 2025-01-02 | Stop reason: HOSPADM

## 2025-01-02 RX ORDER — DIPHENHYDRAMINE HYDROCHLORIDE 50 MG/ML
25-50 INJECTION INTRAMUSCULAR; INTRAVENOUS
Status: DISCONTINUED | OUTPATIENT
Start: 2025-01-02 | End: 2025-01-02 | Stop reason: HOSPADM

## 2025-01-02 RX ADMIN — TOPICAL ANESTHETIC 0.5 ML: 200 SPRAY DENTAL; PERIODONTAL at 10:17

## 2025-01-02 RX ADMIN — FENTANYL CITRATE 50 MCG: 50 INJECTION, SOLUTION INTRAMUSCULAR; INTRAVENOUS at 10:17

## 2025-01-02 RX ADMIN — MIDAZOLAM 1 MG: 1 INJECTION INTRAMUSCULAR; INTRAVENOUS at 10:17

## 2025-01-02 ASSESSMENT — ACTIVITIES OF DAILY LIVING (ADL)
ADLS_ACUITY_SCORE: 53

## 2025-01-02 NOTE — H&P
Pre-Endoscopy History and Physical     Jennifer Whiteside MRN# [id number]   YOB: 1935 Age: 89 year old     Date of Procedure: 1/2/2025  Primary care provider: Piper Jim  Type of Endoscopy: Gastroscopy with possible biopsy, possible dilation  Reason for Procedure: dysphagia  Type of Anesthesia Anticipated: Conscious Sedation    HPI:    Jennifer is a 89 year old female who will be undergoing the above procedure.      A history and physical has been performed. The patient's medications and allergies have been reviewed. The risks and benefits of the procedure and the sedation options and risks were discussed with the patient.  All questions were answered and informed consent was obtained.      She denies a personal or family history of anesthesia complications or bleeding disorders.     Patient Active Problem List   Diagnosis    Hyperlipidemia LDL goal <70    Paroxysmal atrial fibrillation (H)    Bilateral carotid artery disease, unspecified type (H)    Osteopenia of multiple sites    SVT (supraventricular tachycardia) (H)    Benign essential hypertension    Left inguinal hernia    Urinary frequency    Lightheadedness        Past Medical History:   Diagnosis Date    Atrial fibrillation (H) 5/2012    with RVR.  Occurred POD #2 following carotid enarterectomy.    Depressive disorder     Elevated LFTs     occurred on lipitor; resolved off medications    Esophageal stenosis     Hyperlipidaemia     Hyperlipidemia     Irregular heart beat     New onset atrial fibrillation (H) 5/13/2012    Osteoarthrosis     PAD (peripheral artery disease) (H)     PVD (peripheral vascular disease) (H)     s/p carotid enarterectomy 5-10-12    SVT (supraventricular tachycardia) (H) 4/21/2020        Past Surgical History:   Procedure Laterality Date    APPENDECTOMY      appy as a child    CLOSED REDUCTION WRIST Right 1/20/2016    Procedure: CLOSED REDUCTION WRIST;  Surgeon: Mina Brand MD;  Location:  OR     "COLONOSCOPY  2013    Procedure: COMBINED COLONOSCOPY, SINGLE BIOPSY/POLYPECTOMY BY BIOPSY;  COLONOSCOPY with polypectomy using cold forceps.;  Surgeon: Madeline Oviedo MD;  Location:  GI    COLONOSCOPY  2016    Dr. Oviedo [facility]    COLONOSCOPY N/A 2016    Procedure: COMBINED COLONOSCOPY, SINGLE OR MULTIPLE BIOPSY/POLYPECTOMY BY BIOPSY;  Surgeon: Madeline Oviedo MD;  Location: [facility]    ENDARTERECTOMY CAROTID  5/10/2012    LEFT, WITH EEG ; Surgeon:SELINA HANKS; Location: OR    ENT SURGERY      T&A as a child    HC CORRECT BUNION,SIMPLE      Right    HC REPAIR ROTATOR CUFF,ACUTE      Right    HC REPAIR ROTATOR CUFF,ACUTE      Left    HYSTERECTOMY, VAGINAL      simple     rt foot hammer toe repair      ZZC HILLIARD W/O FACETEC FORAMOT/[facility]  VRT SEG, LUMBAR      L4       Social History     Tobacco Use    Smoking status: Former     Current packs/day: 0.50     Average packs/day: 0.5 packs/day for 3.0 years (1.5 ttl pk-yrs)     Types: Cigarettes    Smokeless tobacco: Never    Tobacco comments:     quit    Substance Use Topics    Alcohol use: Yes     Alcohol/week: 7.0 - 14.0 standard drinks of alcohol     Types: 7 - 14 Glasses of wine per week     Comment: one \"glass\" of wine is about 2 drinks worth       Family History   Problem Relation Age of Onset    Diabetes Brother     Diabetes Brother     C.A.D. Father          52 yo Cerebral hemorrhage    Hypertension Mother          84 yo    Arthritis Sister     Colon Cancer Sister     Family History Negative Son         Rheumatic fever    Family History Negative Son     Family History Negative Son     Family History Negative Daughter     Hypertension Daughter     Gynecology Daughter         D & C with issues uncertain       Prior to Admission medications    Medication Sig Start Date End Date Taking? Authorizing Provider   apixaban ANTICOAGULANT (ELIQUIS) 2.5 MG tablet Take 1 tablet (2.5 mg) by mouth 2 times daily 24  Yes " "Cathie Jones APRN CNP   latanoprost (XALATAN) 0.005 % ophthalmic solution Place 1 drop into both eyes At Bedtime   Yes Unknown, Entered By History   metoprolol succinate ER (TOPROL XL) 25 MG 24 hr tablet Take 1 tablet (25 mg) by mouth every evening. 12/30/24  Yes Piper Jim MD   pravastatin (PRAVACHOL) 40 MG tablet Take 1 tablet (40 mg) by mouth daily. 1/15/25  Yes Piper Jim MD   sertraline (ZOLOFT) 50 MG tablet Take 1 tablet (50 mg) by mouth daily. 1/20/25  Yes Piper Jim MD   timolol maleate (TIMOPTIC) 0.5 % ophthalmic solution Instill 1 drop Right Eye every morning* 10/21/24  Yes Reported, Patient   GARLIC PO Take 1 tablet by mouth daily     Reported, Patient   multivitamin w/minerals (MULTI-VITAMIN) tablet Take 1 tablet by mouth daily    Reported, Patient   omega-3 fatty acids 1200 MG capsule Take 1 capsule by mouth 2 times daily.    Reported, Patient   polyethylene glycol-propylene glycol (SYSTANE ULTRA) 0.4-0.3 % SOLN ophthalmic solution Place 1 drop into both eyes 2 times daily    Unknown, Entered By History   SENNA-docusate sodium (SENNA S) 8.6-50 MG tablet Take 1 tablet by mouth at bedtime. 12/9/24   Breann Rothman APRN CNP   Vitamin D, Cholecalciferol, 25 MCG (1000 UT) TABS Take 25 mcg by mouth daily     Reported, Patient   diltiazem ER (DILT-XR) 240 MG 24 hr ER beaded capsule Take 1 capsule (240 mg) by mouth daily 5/29/20 3/22/22  Lucius Truong MD       No Known Allergies     REVIEW OF SYSTEMS:   5 point ROS negative except as noted above in HPI, including Gen., Resp., CV, GI &  system review.    PHYSICAL EXAM:   BP (!) 158/50   Pulse 76   Resp 16   Ht 1.6 m (5' 3\")   Wt 57.2 kg (126 lb)   LMP  (LMP Unknown)   SpO2 93%   BMI 22.32 kg/m   Estimated body mass index is 22.32 kg/m  as calculated from the following:    Height as of this encounter: 1.6 m (5' 3\").    Weight as of this encounter: 57.2 kg (126 lb).   GENERAL APPEARANCE: alert, and oriented  MENTAL STATUS: " alert  AIRWAY EXAM: Mallampatti Class I (visualization of the soft palate, fauces, uvula, anterior and posterior pillars)  RESP: lungs clear to auscultation - no rales, rhonchi or wheezes  CV: regular rates and rhythm  DIAGNOSTICS:    Not indicated    IMPRESSION   ASA Class 2 - Mild systemic disease    PLAN:   Plan for Gastroscopy with possible biopsy, possible dilation. We discussed the risks, benefits and alternatives and the patient wished to proceed.    The above has been forwarded to the consulting provider.      Signed Electronically by: Fabricio Varner MD  January 2, 2025

## 2025-01-03 LAB
PATH REPORT.COMMENTS IMP SPEC: NORMAL
PATH REPORT.COMMENTS IMP SPEC: NORMAL
PATH REPORT.FINAL DX SPEC: NORMAL
PATH REPORT.GROSS SPEC: NORMAL
PATH REPORT.MICROSCOPIC SPEC OTHER STN: NORMAL
PATH REPORT.RELEVANT HX SPEC: NORMAL
PHOTO IMAGE: NORMAL

## 2025-01-03 PROCEDURE — 88305 TISSUE EXAM BY PATHOLOGIST: CPT | Mod: 26 | Performed by: PATHOLOGY

## 2025-01-16 DIAGNOSIS — F41.9 ANXIETY: ICD-10-CM

## 2025-02-21 ENCOUNTER — ANCILLARY PROCEDURE (OUTPATIENT)
Dept: GENERAL RADIOLOGY | Facility: CLINIC | Age: 89
End: 2025-02-21
Payer: COMMERCIAL

## 2025-02-21 DIAGNOSIS — M25.361 RIGHT KNEE GIVES WAY: ICD-10-CM

## 2025-02-21 PROCEDURE — 73562 X-RAY EXAM OF KNEE 3: CPT | Mod: TC | Performed by: STUDENT IN AN ORGANIZED HEALTH CARE EDUCATION/TRAINING PROGRAM

## 2025-03-28 DIAGNOSIS — I10 BENIGN ESSENTIAL HYPERTENSION: ICD-10-CM

## 2025-03-28 DIAGNOSIS — I48.0 PAROXYSMAL ATRIAL FIBRILLATION (H): ICD-10-CM

## 2025-04-01 RX ORDER — METOPROLOL SUCCINATE 25 MG/1
25 TABLET, EXTENDED RELEASE ORAL EVERY EVENING
Qty: 90 TABLET | Refills: 1 | Status: SHIPPED | OUTPATIENT
Start: 2025-04-01

## 2025-07-01 DIAGNOSIS — I48.0 PAROXYSMAL ATRIAL FIBRILLATION (H): ICD-10-CM

## 2025-07-01 NOTE — TELEPHONE ENCOUNTER
[facility] Cardiology Refill Guideline reviewed.  Medication meets criteria for refill. Labs updated 2/2025.

## 2025-07-28 ENCOUNTER — OFFICE VISIT (OUTPATIENT)
Dept: CARDIOLOGY | Facility: CLINIC | Age: 89
End: 2025-07-28
Payer: COMMERCIAL

## 2025-07-28 VITALS
HEART RATE: 73 BPM | BODY MASS INDEX: 24.1 KG/M2 | SYSTOLIC BLOOD PRESSURE: 112 MMHG | DIASTOLIC BLOOD PRESSURE: 62 MMHG | HEIGHT: 63 IN | OXYGEN SATURATION: 99 % | WEIGHT: 136 LBS

## 2025-07-28 DIAGNOSIS — R42 LIGHTHEADEDNESS: Primary | ICD-10-CM

## 2025-07-28 DIAGNOSIS — I77.9 BILATERAL CAROTID ARTERY DISEASE, UNSPECIFIED TYPE: ICD-10-CM

## 2025-07-28 DIAGNOSIS — I10 BENIGN ESSENTIAL HYPERTENSION: ICD-10-CM

## 2025-07-28 DIAGNOSIS — I48.0 PAROXYSMAL ATRIAL FIBRILLATION (H): ICD-10-CM

## 2025-07-28 DIAGNOSIS — R01.1 HEART MURMUR: ICD-10-CM

## 2025-07-28 DIAGNOSIS — E78.5 HYPERLIPIDEMIA LDL GOAL <70: ICD-10-CM

## 2025-07-28 PROCEDURE — 3074F SYST BP LT 130 MM HG: CPT | Performed by: NURSE PRACTITIONER

## 2025-07-28 PROCEDURE — 3078F DIAST BP <80 MM HG: CPT | Performed by: NURSE PRACTITIONER

## 2025-07-28 PROCEDURE — 99214 OFFICE O/P EST MOD 30 MIN: CPT | Performed by: NURSE PRACTITIONER

## 2025-07-28 RX ORDER — METOPROLOL SUCCINATE 25 MG/1
12.5 TABLET, EXTENDED RELEASE ORAL EVERY EVENING
Status: SHIPPED
Start: 2025-07-28

## 2025-07-28 NOTE — PROGRESS NOTES
CARDIOLOGY CLINIC NOTE    PRIMARY CARDIOLOGIST  Dr. Truong     PRIMARY CARE PHYSICIAN:  Piper Jim    HISTORY OF PRESENT ILLNESS:  Jennifer Whiteside is a very pleasant 89-year-old female with a past medical history significant for paroxysmal atrial fibrillation on chronic anticoagulation, SVT, hypertension, hyperlipidemia, bilateral carotid artery disease status post endarterectomy, PVD and anxiety.     She returns to the office today for evaluation of lightheadedness/dizziness.  She was recently evaluated by neurology who felt her symptoms were indicative of orthostatic hypotension.  She was recommended for compression stockings and following up with cardiology and cardiology for medication management.  Today, she is feeling well on a cardiac standpoint, denies chest pain, shortness of breath, palpitations, PND, orthopnea, presyncope, syncope or edema.  She notes her lightheadedness episodes are occasional, approximately 2 times per week    Last Nuclear stress test in 2021, negative for myocardial ischemia.     Echocardiogram in 2022 that showed a normal ejection fraction estimated at 60 to 65%, grade 1 diastolic dysfunction, mild tricuspid regurgitation and elevated RV systolic pressure, consistent with mild pulmonary hypertension    Recurrent atrial fibrillation with RVR (1/2025)  s/p spontaneous cardioversion on IV lopressor.     Blood pressure is 112/62, consistent with home pressure monitoring.  Last blood work was reviewed, CBC and BMP were unremarkable.  TSH normal at 2.82.  Lipid panel showed total cholesterol 146, HDL 57, LDL 66 and triglycerides 114.    She continues to be quite active at her living facility.  She is now using a walker consistently.  Compliant with all medication      PAST MEDICAL HISTORY:  Past Medical History:   Diagnosis Date    Atrial fibrillation (H) 5/2012    with RVR.  Occurred POD #2 following carotid enarterectomy.    Depressive disorder     Elevated LFTs     occurred on  lipitor; resolved off medications    Esophageal stenosis     Hyperlipidaemia     Hyperlipidemia     Irregular heart beat     New onset atrial fibrillation (H) 5/13/2012    Osteoarthrosis     PAD (peripheral artery disease)     PVD (peripheral vascular disease)     s/p carotid enarterectomy 5-10-12    SVT (supraventricular tachycardia) 4/21/2020       MEDICATIONS:  Current Outpatient Medications   Medication Sig Dispense Refill    apixaban ANTICOAGULANT (ELIQUIS) 2.5 MG tablet Take 1 tablet (2.5 mg) by mouth 2 times daily. 180 tablet 3    diclofenac (VOLTAREN) 1 % topical gel Apply 2 g topically 4 times daily. 150 g 0    GARLIC PO Take 1 tablet by mouth daily       latanoprost (XALATAN) 0.005 % ophthalmic solution Place 1 drop into both eyes At Bedtime      metoprolol succinate ER (TOPROL XL) 25 MG 24 hr tablet Take 0.5 tablets (12.5 mg) by mouth every evening.      multivitamin w/minerals (MULTI-VITAMIN) tablet Take 1 tablet by mouth daily      omega-3 fatty acids 1200 MG capsule Take 1 capsule by mouth 2 times daily.      polyethylene glycol-propylene glycol (SYSTANE ULTRA) 0.4-0.3 % SOLN ophthalmic solution Place 1 drop into both eyes 2 times daily      pravastatin (PRAVACHOL) 40 MG tablet Take 1 tablet (40 mg) by mouth daily. 90 tablet 2    SENNA-docusate sodium (SENNA S) 8.6-50 MG tablet Take 1 tablet by mouth at bedtime. 90 tablet 3    sertraline (ZOLOFT) 50 MG tablet Take 1 tablet by mouth once daily 90 tablet 1    timolol maleate (TIMOPTIC) 0.5 % ophthalmic solution Instill 1 drop Right Eye every morning*      Vitamin D, Cholecalciferol, 25 MCG (1000 UT) TABS Take 25 mcg by mouth daily        No current facility-administered medications for this visit.       SOCIAL HISTORY:  I have reviewed this patient's social history and updated it with pertinent information if needed. Jennifer Whiteside  reports that she has quit smoking. Her smoking use included cigarettes. She has a 1.5 pack-year smoking history.  She has never used smokeless tobacco. She reports current alcohol use of about 7.0 - 14.0 standard drinks of alcohol per week. She reports that she does not use drugs.    PHYSICAL EXAM:  Pulse:  [73] 73  BP: (112-143)/(62-71) 112/62  SpO2:  [99 %] 99 %  136 lbs 0 oz    Constitutional: alert, no distress  Respiratory: Good bilateral air entry  Cardiovascular: Regular rate and rhythm.  GI: nondistended  Neuropsychiatric: appropriate affact    ASSESSMENT/PLAN:  Pertinent issues addressed/ reviewed during this cardiology visit    Lightheadedness -intermittent, 2 times per week.  In an attempt to see if symptoms improve, will trial low dose Toprol 12.5 mg in the evening. Monitor for increased heart rates and blood pressures. My nurse will call in 2 weeks for update in symptoms.   Heart Murmur - Will get follow up echocardiogram for structural evaluation.   Paroxysmal atrial fibrillation -currently in regular rhythm. Continue Eliquis and reduced dose Toprol.   Hypertension -well-controlled  Hyperlipidemia -on pravastatin  Carotid disease -status post carotid endarterectomy. On statin and Eliquis.     Follow up in 1 month with TYRONE for review of results.       It was a pleasure seeing this patient in clinic today. Please do not hesitate to contact me with any future questions.     PAVITHRA Charles, CNP  Cardiology - [facility]  07/28/2025       The level of medical decision making during this visit was of moderate complexity.    This note was completed in part using dictation via the Dragon voice recognition software. Some word and grammatical errors may occur and must be interpreted in the appropriate clinical context.  If there are any questions pertaining to this issue, please contact me for further clarification.

## 2025-07-28 NOTE — LETTER
7/28/2025    Piper Jim MD  [address] [facility] [address]    RE: Jennifer Castorenapamela       Dear Colleague,     I had the pleasure of seeing Jennifer Whiteside in the [facility].  CARDIOLOGY CLINIC NOTE    PRIMARY CARDIOLOGIST  Dr. Truong     PRIMARY CARE PHYSICIAN:  Piper Jim    HISTORY OF PRESENT ILLNESS:  Jennifer Whiteside is a very pleasant 89-year-old female with a past medical history significant for paroxysmal atrial fibrillation on chronic anticoagulation, SVT, hypertension, hyperlipidemia, bilateral carotid artery disease status post endarterectomy, PVD and anxiety.     She returns to the office today for evaluation of lightheadedness/dizziness.  She was recently evaluated by neurology who felt her symptoms were indicative of orthostatic hypotension.  She was recommended for compression stockings and following up with cardiology and cardiology for medication management.  Today, she is feeling well on a cardiac standpoint, denies chest pain, shortness of breath, palpitations, PND, orthopnea, presyncope, syncope or edema.  She notes her lightheadedness episodes are occasional, approximately 2 times per week    Last Nuclear stress test in 2021, negative for myocardial ischemia.     Echocardiogram in 2022 that showed a normal ejection fraction estimated at 60 to 65%, grade 1 diastolic dysfunction, mild tricuspid regurgitation and elevated RV systolic pressure, consistent with mild pulmonary hypertension    Recurrent atrial fibrillation with RVR (1/2025)  s/p spontaneous cardioversion on IV lopressor.     Blood pressure is 112/62, consistent with home pressure monitoring.  Last blood work was reviewed, CBC and BMP were unremarkable.  TSH normal at 2.82.  Lipid panel showed total cholesterol 146, HDL 57, LDL 66 and triglycerides 114.    She continues to be quite active at her living facility.  She is now using a walker consistently.  Compliant with all  medication      PAST MEDICAL HISTORY:  Past Medical History:   Diagnosis Date     Atrial fibrillation (H) 5/2012    with RVR.  Occurred POD #2 following carotid enarterectomy.     Depressive disorder      Elevated LFTs     occurred on lipitor; resolved off medications     Esophageal stenosis      Hyperlipidaemia      Hyperlipidemia      Irregular heart beat      New onset atrial fibrillation (H) 5/13/2012     Osteoarthrosis      PAD (peripheral artery disease)      PVD (peripheral vascular disease)     s/p carotid enarterectomy 5-10-12     SVT (supraventricular tachycardia) 4/21/2020       MEDICATIONS:  Current Outpatient Medications   Medication Sig Dispense Refill     apixaban ANTICOAGULANT (ELIQUIS) 2.5 MG tablet Take 1 tablet (2.5 mg) by mouth 2 times daily. 180 tablet 3     diclofenac (VOLTAREN) 1 % topical gel Apply 2 g topically 4 times daily. 150 g 0     GARLIC PO Take 1 tablet by mouth daily        latanoprost (XALATAN) 0.005 % ophthalmic solution Place 1 drop into both eyes At Bedtime       metoprolol succinate ER (TOPROL XL) 25 MG 24 hr tablet Take 0.5 tablets (12.5 mg) by mouth every evening.       multivitamin w/minerals (MULTI-VITAMIN) tablet Take 1 tablet by mouth daily       omega-3 fatty acids 1200 MG capsule Take 1 capsule by mouth 2 times daily.       polyethylene glycol-propylene glycol (SYSTANE ULTRA) 0.4-0.3 % SOLN ophthalmic solution Place 1 drop into both eyes 2 times daily       pravastatin (PRAVACHOL) 40 MG tablet Take 1 tablet (40 mg) by mouth daily. 90 tablet 2     SENNA-docusate sodium (SENNA S) 8.6-50 MG tablet Take 1 tablet by mouth at bedtime. 90 tablet 3     sertraline (ZOLOFT) 50 MG tablet Take 1 tablet by mouth once daily 90 tablet 1     timolol maleate (TIMOPTIC) 0.5 % ophthalmic solution Instill 1 drop Right Eye every morning*       Vitamin D, Cholecalciferol, 25 MCG (1000 UT) TABS Take 25 mcg by mouth daily        No current facility-administered medications for this visit.        SOCIAL HISTORY:  I have reviewed this patient's social history and updated it with pertinent information if needed. Jennifer Whiteside  reports that she has quit smoking. Her smoking use included cigarettes. She has a 1.5 pack-year smoking history. She has never used smokeless tobacco. She reports current alcohol use of about 7.0 - 14.0 standard drinks of alcohol per week. She reports that she does not use drugs.    PHYSICAL EXAM:  Pulse:  [73] 73  BP: (112-143)/(62-71) 112/62  SpO2:  [99 %] 99 %  136 lbs 0 oz    Constitutional: alert, no distress  Respiratory: Good bilateral air entry  Cardiovascular: Regular rate and rhythm.  GI: nondistended  Neuropsychiatric: appropriate affact    ASSESSMENT/PLAN:  Pertinent issues addressed/ reviewed during this cardiology visit    Lightheadedness -intermittent, 2 times per week.  In an attempt to see if symptoms improve, will trial low dose Toprol 12.5 mg in the evening. Monitor for increased heart rates and blood pressures. My nurse will call in 2 weeks for update in symptoms.   Heart Murmur - Will get follow up echocardiogram for structural evaluation.   Paroxysmal atrial fibrillation -currently in regular rhythm. Continue Eliquis and reduced dose Toprol.   Hypertension -well-controlled  Hyperlipidemia -on pravastatin  Carotid disease -status post carotid endarterectomy. On statin and Eliquis.     Follow up in 1 month with TYRONE for review of results.       It was a pleasure seeing this patient in clinic today. Please do not hesitate to contact me with any future questions.     PAVITHRA Charles, CNP  Cardiology - [facility]  07/28/2025       The level of medical decision making during this visit was of moderate complexity.    This note was completed in part using dictation via the Dragon voice recognition software. Some word and grammatical errors may occur and must be interpreted in the appropriate clinical context.  If there are any questions pertaining to  this issue, please contact me for further clarification.    Thank you for allowing me to participate in the care of your patient.      Sincerely,     PAVITHRA Charles CNP     [facility]  cc:   PAVITHRA Charles CNP  [address] [place] [address]

## 2025-07-28 NOTE — PATIENT INSTRUCTIONS
Thanks for participating in a office visit with the [facility] today.    Intermittent lightheadedness, improved with compression stockings.   Blood pressures well controlled.   Will trial a lower dose metoprolol and see if symptoms improve  Continue to monitor blood pressures, goal < 140/90.   Will get a follow up echo for structural reassessment.   Continue all other medications  Encourage hydration and exercise  My nurse will call in 2 weeks for update.     Follow up in 1 month with TYRONE for review of echo results     Please call my nurse at   [phone]. Call with any questions or concerns.    Scheduling phone number: [phone].   Reminder: Please bring in all current medications, over the counter supplements and vitamin bottles to your next appointment.

## 2025-08-12 ENCOUNTER — TELEPHONE (OUTPATIENT)
Dept: CARDIOLOGY | Facility: CLINIC | Age: 89
End: 2025-08-12
Payer: COMMERCIAL

## 2025-09-01 ENCOUNTER — APPOINTMENT (OUTPATIENT)
Dept: CT IMAGING | Facility: CLINIC | Age: 89
End: 2025-09-01
Payer: COMMERCIAL

## 2025-09-01 ENCOUNTER — APPOINTMENT (OUTPATIENT)
Dept: GENERAL RADIOLOGY | Facility: CLINIC | Age: 89
End: 2025-09-01
Payer: COMMERCIAL

## 2025-09-01 ENCOUNTER — HOSPITAL ENCOUNTER (EMERGENCY)
Facility: CLINIC | Age: 89
Discharge: HOME | End: 2025-09-01
Payer: COMMERCIAL

## 2025-09-01 VITALS
TEMPERATURE: 97.4 F | HEART RATE: 78 BPM | WEIGHT: 122 LBS | BODY MASS INDEX: 21.62 KG/M2 | OXYGEN SATURATION: 99 % | RESPIRATION RATE: 20 BRPM | SYSTOLIC BLOOD PRESSURE: 171 MMHG | DIASTOLIC BLOOD PRESSURE: 86 MMHG | HEIGHT: 63 IN

## 2025-09-01 DIAGNOSIS — N30.01 ACUTE CYSTITIS WITH HEMATURIA: ICD-10-CM

## 2025-09-01 DIAGNOSIS — N94.9 ADNEXAL CYST: ICD-10-CM

## 2025-09-01 DIAGNOSIS — E87.1 HYPONATREMIA: ICD-10-CM

## 2025-09-01 DIAGNOSIS — M54.50 ACUTE LEFT-SIDED LOW BACK PAIN WITHOUT SCIATICA: Primary | ICD-10-CM

## 2025-09-01 LAB
ALBUMIN UR-MCNC: NEGATIVE MG/DL
AMORPH CRY #/AREA URNS HPF: ABNORMAL /HPF
ANION GAP SERPL CALCULATED.3IONS-SCNC: 11 MMOL/L (ref 7–15)
APPEARANCE UR: ABNORMAL
BACTERIA #/AREA URNS HPF: ABNORMAL /HPF
BASOPHILS # BLD AUTO: 0.06 10E3/UL (ref 0–0.2)
BASOPHILS NFR BLD AUTO: 0.8 %
BILIRUB UR QL STRIP: NEGATIVE
BUN SERPL-MCNC: 13 MG/DL (ref 8–23)
CALCIUM SERPL-MCNC: 9.4 MG/DL (ref 8.8–10.4)
CHLORIDE SERPL-SCNC: 94 MMOL/L (ref 98–107)
COLOR UR AUTO: YELLOW
CREAT SERPL-MCNC: 0.6 MG/DL (ref 0.51–0.95)
EGFRCR SERPLBLD CKD-EPI 2021: 85 ML/MIN/1.73M2
EOSINOPHIL # BLD AUTO: <0.03 10E3/UL (ref 0–0.7)
EOSINOPHIL NFR BLD AUTO: 0.1 %
ERYTHROCYTE [DISTWIDTH] IN BLOOD BY AUTOMATED COUNT: 11.8 % (ref 10–15)
GLUCOSE SERPL-MCNC: 118 MG/DL (ref 70–99)
GLUCOSE UR STRIP-MCNC: NEGATIVE MG/DL
HCO3 SERPL-SCNC: 25 MMOL/L (ref 22–29)
HCT VFR BLD AUTO: 36.7 % (ref 35–47)
HGB BLD-MCNC: 12.5 G/DL (ref 11.7–15.7)
HGB UR QL STRIP: ABNORMAL
HYALINE CASTS: 1 /LPF
IMM GRANULOCYTES # BLD: <0.03 10E3/UL
IMM GRANULOCYTES NFR BLD: 0.3 %
KETONES UR STRIP-MCNC: NEGATIVE MG/DL
LEUKOCYTE ESTERASE UR QL STRIP: ABNORMAL
LYMPHOCYTES # BLD AUTO: 1.2 10E3/UL (ref 0.8–5.3)
LYMPHOCYTES NFR BLD AUTO: 15.5 %
MCH RBC QN AUTO: 32.9 PG (ref 26.5–33)
MCHC RBC AUTO-ENTMCNC: 34.1 G/DL (ref 31.5–36.5)
MCV RBC AUTO: 96.6 FL (ref 78–100)
MONOCYTES # BLD AUTO: 0.52 10E3/UL (ref 0–1.3)
MONOCYTES NFR BLD AUTO: 6.7 %
MUCOUS THREADS #/AREA URNS LPF: PRESENT /LPF
NEUTROPHILS # BLD AUTO: 5.91 10E3/UL (ref 1.6–8.3)
NEUTROPHILS NFR BLD AUTO: 76.6 %
NITRATE UR QL: NEGATIVE
NRBC # BLD AUTO: <0.03 10E3/UL
NRBC BLD AUTO-RTO: 0 /100
PH UR STRIP: 8 [PH] (ref 5–7)
PLATELET # BLD AUTO: 258 10E3/UL (ref 150–450)
POTASSIUM SERPL-SCNC: 4 MMOL/L (ref 3.4–5.3)
RBC # BLD AUTO: 3.8 10E6/UL (ref 3.8–5.2)
RBC URINE: 31 /HPF
SODIUM SERPL-SCNC: 130 MMOL/L (ref 135–145)
SP GR UR STRIP: 1.01 (ref 1–1.03)
SQUAMOUS EPITHELIAL: 1 /HPF
UROBILINOGEN UR STRIP-MCNC: NORMAL MG/DL
WBC # BLD AUTO: 7.72 10E3/UL (ref 4–11)
WBC URINE: 13 /HPF

## 2025-09-01 PROCEDURE — 87086 URINE CULTURE/COLONY COUNT: CPT

## 2025-09-01 PROCEDURE — 81001 URINALYSIS AUTO W/SCOPE: CPT

## 2025-09-01 PROCEDURE — 250N000013 HC RX MED GY IP 250 OP 250 PS 637

## 2025-09-01 PROCEDURE — 255N000002 HC RX 255 OP 636

## 2025-09-01 PROCEDURE — 85025 COMPLETE CBC W/AUTO DIFF WBC: CPT

## 2025-09-01 PROCEDURE — 74177 CT ABD & PELVIS W/CONTRAST: CPT

## 2025-09-01 PROCEDURE — 73502 X-RAY EXAM HIP UNI 2-3 VIEWS: CPT

## 2025-09-01 PROCEDURE — 84132 ASSAY OF SERUM POTASSIUM: CPT

## 2025-09-01 PROCEDURE — 36415 COLL VENOUS BLD VENIPUNCTURE: CPT

## 2025-09-01 PROCEDURE — 99285 EMERGENCY DEPT VISIT HI MDM: CPT | Mod: 25

## 2025-09-01 RX ORDER — ACETAMINOPHEN 500 MG
1000 TABLET ORAL ONCE
Status: COMPLETED | OUTPATIENT
Start: 2025-09-01 | End: 2025-09-01

## 2025-09-01 RX ORDER — LIDOCAINE 50 MG/G
1 PATCH TOPICAL EVERY 24 HOURS
Qty: 12 PATCH | Refills: 0 | Status: SHIPPED | OUTPATIENT
Start: 2025-09-01

## 2025-09-01 RX ORDER — LIDOCAINE 4 G/G
1 PATCH TOPICAL ONCE
Status: DISCONTINUED | OUTPATIENT
Start: 2025-09-01 | End: 2025-09-01 | Stop reason: HOSPADM

## 2025-09-01 RX ORDER — CEPHALEXIN 500 MG/1
500 CAPSULE ORAL ONCE
Status: COMPLETED | OUTPATIENT
Start: 2025-09-01 | End: 2025-09-01

## 2025-09-01 RX ORDER — METHOCARBAMOL 500 MG/1
500 TABLET, FILM COATED ORAL 4 TIMES DAILY PRN
Qty: 20 TABLET | Refills: 0 | Status: SHIPPED | OUTPATIENT
Start: 2025-09-01

## 2025-09-01 RX ORDER — CEPHALEXIN 500 MG/1
500 CAPSULE ORAL 2 TIMES DAILY
Qty: 13 CAPSULE | Refills: 0 | Status: SHIPPED | OUTPATIENT
Start: 2025-09-01 | End: 2025-09-08

## 2025-09-01 RX ORDER — ACETAMINOPHEN 500 MG
500-1000 TABLET ORAL EVERY 6 HOURS PRN
Qty: 60 TABLET | Refills: 0 | Status: SHIPPED | OUTPATIENT
Start: 2025-09-01

## 2025-09-01 RX ADMIN — CEPHALEXIN 500 MG: 500 CAPSULE ORAL at 12:58

## 2025-09-01 RX ADMIN — LIDOCAINE 1 PATCH: 4 PATCH TOPICAL at 12:58

## 2025-09-01 RX ADMIN — IOHEXOL 64 ML: 350 INJECTION, SOLUTION INTRAVENOUS at 10:16

## 2025-09-01 RX ADMIN — ACETAMINOPHEN 1000 MG: 500 TABLET, FILM COATED ORAL at 12:58

## 2025-09-01 ASSESSMENT — COLUMBIA-SUICIDE SEVERITY RATING SCALE - C-SSRS
2. HAVE YOU ACTUALLY HAD ANY THOUGHTS OF KILLING YOURSELF IN THE PAST MONTH?: NO
6. HAVE YOU EVER DONE ANYTHING, STARTED TO DO ANYTHING, OR PREPARED TO DO ANYTHING TO END YOUR LIFE?: NO
1. IN THE PAST MONTH, HAVE YOU WISHED YOU WERE DEAD OR WISHED YOU COULD GO TO SLEEP AND NOT WAKE UP?: NO

## 2025-09-01 ASSESSMENT — ACTIVITIES OF DAILY LIVING (ADL)
ADLS_ACUITY_SCORE: 53

## 2025-09-02 LAB — BACTERIA UR CULT: NORMAL

## (undated) DEVICE — ENDO BITE BLOCK ADULT OLYMPUS LATEX FREE MAJ-1632

## (undated) DEVICE — KIT ENDO TURNOVER/PROCEDURE W/CLEAN A SCOPE LINERS 103888

## (undated) RX ORDER — REGADENOSON 0.08 MG/ML
INJECTION, SOLUTION INTRAVENOUS
Status: DISPENSED
Start: 2021-08-18

## (undated) RX ORDER — FENTANYL CITRATE 50 UG/ML
INJECTION, SOLUTION INTRAMUSCULAR; INTRAVENOUS
Status: DISPENSED
Start: 2025-01-02